# Patient Record
Sex: MALE | Race: WHITE | NOT HISPANIC OR LATINO | Employment: FULL TIME | URBAN - METROPOLITAN AREA
[De-identification: names, ages, dates, MRNs, and addresses within clinical notes are randomized per-mention and may not be internally consistent; named-entity substitution may affect disease eponyms.]

---

## 2017-04-24 RX ORDER — TADALAFIL 5 MG/1
TABLET, FILM COATED ORAL
Qty: 30 TABLET | Refills: 5 | Status: SHIPPED | OUTPATIENT
Start: 2017-04-24 | End: 2018-04-30 | Stop reason: SDUPTHER

## 2017-10-06 RX ORDER — VALACYCLOVIR HYDROCHLORIDE 500 MG/1
TABLET, FILM COATED ORAL
Qty: 30 TABLET | Refills: 1 | Status: SHIPPED | OUTPATIENT
Start: 2017-10-06 | End: 2017-11-14 | Stop reason: SDUPTHER

## 2017-11-14 RX ORDER — VALACYCLOVIR HYDROCHLORIDE 500 MG/1
TABLET, FILM COATED ORAL
Qty: 30 TABLET | Refills: 1 | Status: SHIPPED | OUTPATIENT
Start: 2017-11-14 | End: 2018-02-02 | Stop reason: SDUPTHER

## 2018-01-15 ENCOUNTER — LAB VISIT (OUTPATIENT)
Dept: LAB | Facility: HOSPITAL | Age: 68
End: 2018-01-15
Attending: FAMILY MEDICINE
Payer: COMMERCIAL

## 2018-01-15 ENCOUNTER — OFFICE VISIT (OUTPATIENT)
Dept: INTERNAL MEDICINE | Facility: CLINIC | Age: 68
End: 2018-01-15
Payer: COMMERCIAL

## 2018-01-15 VITALS
TEMPERATURE: 97 F | BODY MASS INDEX: 29.22 KG/M2 | WEIGHT: 197.31 LBS | DIASTOLIC BLOOD PRESSURE: 70 MMHG | SYSTOLIC BLOOD PRESSURE: 138 MMHG | HEIGHT: 69 IN | HEART RATE: 69 BPM | OXYGEN SATURATION: 96 %

## 2018-01-15 DIAGNOSIS — Z00.00 ROUTINE HEALTH MAINTENANCE: Primary | ICD-10-CM

## 2018-01-15 DIAGNOSIS — K21.9 GASTROESOPHAGEAL REFLUX DISEASE, ESOPHAGITIS PRESENCE NOT SPECIFIED: ICD-10-CM

## 2018-01-15 DIAGNOSIS — R91.1 PULMONARY NODULE: ICD-10-CM

## 2018-01-15 DIAGNOSIS — K76.0 FATTY LIVER: ICD-10-CM

## 2018-01-15 DIAGNOSIS — Z00.00 ROUTINE HEALTH MAINTENANCE: ICD-10-CM

## 2018-01-15 DIAGNOSIS — Z12.11 SCREEN FOR COLON CANCER: ICD-10-CM

## 2018-01-15 DIAGNOSIS — D22.9 NUMEROUS MOLES: ICD-10-CM

## 2018-01-15 LAB
ALBUMIN SERPL BCP-MCNC: 3.7 G/DL
ALP SERPL-CCNC: 123 U/L
ALT SERPL W/O P-5'-P-CCNC: 39 U/L
ANION GAP SERPL CALC-SCNC: 6 MMOL/L
AST SERPL-CCNC: 28 U/L
BASOPHILS # BLD AUTO: 0.02 K/UL
BASOPHILS NFR BLD: 0.3 %
BILIRUB SERPL-MCNC: 0.4 MG/DL
BUN SERPL-MCNC: 15 MG/DL
CALCIUM SERPL-MCNC: 9.6 MG/DL
CHLORIDE SERPL-SCNC: 104 MMOL/L
CHOLEST SERPL-MCNC: 123 MG/DL
CHOLEST/HDLC SERPL: 2.9 {RATIO}
CO2 SERPL-SCNC: 29 MMOL/L
COMPLEXED PSA SERPL-MCNC: 2.4 NG/ML
CREAT SERPL-MCNC: 0.8 MG/DL
DIFFERENTIAL METHOD: NORMAL
EOSINOPHIL # BLD AUTO: 0.2 K/UL
EOSINOPHIL NFR BLD: 2.5 %
ERYTHROCYTE [DISTWIDTH] IN BLOOD BY AUTOMATED COUNT: 12.3 %
EST. GFR  (AFRICAN AMERICAN): >60 ML/MIN/1.73 M^2
EST. GFR  (NON AFRICAN AMERICAN): >60 ML/MIN/1.73 M^2
GLUCOSE SERPL-MCNC: 88 MG/DL
HCT VFR BLD AUTO: 46 %
HDLC SERPL-MCNC: 42 MG/DL
HDLC SERPL: 34.1 %
HGB BLD-MCNC: 15.3 G/DL
IMM GRANULOCYTES # BLD AUTO: 0.01 K/UL
IMM GRANULOCYTES NFR BLD AUTO: 0.2 %
LDLC SERPL CALC-MCNC: 53.2 MG/DL
LYMPHOCYTES # BLD AUTO: 1.4 K/UL
LYMPHOCYTES NFR BLD: 22.5 %
MCH RBC QN AUTO: 30.1 PG
MCHC RBC AUTO-ENTMCNC: 33.3 G/DL
MCV RBC AUTO: 90 FL
MONOCYTES # BLD AUTO: 0.6 K/UL
MONOCYTES NFR BLD: 9.7 %
NEUTROPHILS # BLD AUTO: 4 K/UL
NEUTROPHILS NFR BLD: 64.8 %
NONHDLC SERPL-MCNC: 81 MG/DL
NRBC BLD-RTO: 0 /100 WBC
PLATELET # BLD AUTO: 205 K/UL
PMV BLD AUTO: 11.3 FL
POTASSIUM SERPL-SCNC: 4.9 MMOL/L
PROT SERPL-MCNC: 7.2 G/DL
RBC # BLD AUTO: 5.09 M/UL
SODIUM SERPL-SCNC: 139 MMOL/L
TRIGL SERPL-MCNC: 139 MG/DL
WBC # BLD AUTO: 6.09 K/UL

## 2018-01-15 PROCEDURE — 90471 IMMUNIZATION ADMIN: CPT | Mod: S$GLB,,, | Performed by: FAMILY MEDICINE

## 2018-01-15 PROCEDURE — 99397 PER PM REEVAL EST PAT 65+ YR: CPT | Mod: 25,S$GLB,, | Performed by: FAMILY MEDICINE

## 2018-01-15 PROCEDURE — 36415 COLL VENOUS BLD VENIPUNCTURE: CPT | Mod: PO

## 2018-01-15 PROCEDURE — 84153 ASSAY OF PSA TOTAL: CPT

## 2018-01-15 PROCEDURE — 85025 COMPLETE CBC W/AUTO DIFF WBC: CPT

## 2018-01-15 PROCEDURE — 99999 PR PBB SHADOW E&M-EST. PATIENT-LVL III: CPT | Mod: PBBFAC,,, | Performed by: FAMILY MEDICINE

## 2018-01-15 PROCEDURE — 80061 LIPID PANEL: CPT

## 2018-01-15 PROCEDURE — 80053 COMPREHEN METABOLIC PANEL: CPT

## 2018-01-15 PROCEDURE — 90670 PCV13 VACCINE IM: CPT | Mod: S$GLB,,, | Performed by: FAMILY MEDICINE

## 2018-01-15 NOTE — PROGRESS NOTES
Subjective:       Patient ID: Olivier Cuenca is a 67 y.o. male.    Chief Complaint: here for physical examination and issues  below    HPIgerd /hoarsenss resolved on ppi. He stopped sec cri concerns.  bph cialis helps  Fatty liver due flaquita  pulm nod due flaquita  Past Medical History:   Diagnosis Date    BPH (benign prostatic hyperplasia)     Erectile dysfunction     Ex-smoker     quit 1980s    Fatty liver     Herpes simplex     History of poliomyelitis     Migraine     Thalassemia minor      Past Surgical History:   Procedure Laterality Date    APPENDECTOMY      CHOLECYSTECTOMY      HERNIA REPAIR       Family History   Problem Relation Age of Onset    Dementia Mother     Hyperlipidemia Father     Melanoma Neg Hx      Social History     Social History    Marital status:      Spouse name: N/A    Number of children: 2    Years of education: N/A     Occupational History     [employer]     [employer]     Social History Main Topics    Smoking status: Former Smoker     Start date: 3/13/1964     Quit date: 3/13/1985    Smokeless tobacco: Never Used    Alcohol use No    Drug use: No    Sexual activity: Not Asked     Other Topics Concern    None     Social History Narrative    None        Review of Systems  Cardiovascular: no chest pain  Chest: no shortness of breath  Abd: no abd pain  Remainder review of systems negative    Objective:      Physical Exam   Constitutional: He is oriented to person, place, and time. He appears well-developed and well-nourished.   HENT:   Head: Normocephalic and atraumatic.   Right Ear: External ear normal.   Left Ear: External ear normal.   Nose: Nose normal.   Mouth/Throat: Oropharynx is clear and moist.   Nl tms   Eyes: Conjunctivae and EOM are normal. Pupils are equal, round, and reactive to light. No scleral icterus.   Neck: Normal range of motion. Neck supple. Carotid bruit is not present.   Cardiovascular: Normal  rate, regular rhythm and normal heart sounds.  Exam reveals no gallop and no friction rub.    No murmur heard.  Pulmonary/Chest: Effort normal and breath sounds normal. He has no wheezes.   Abdominal: Soft. Bowel sounds are normal. He exhibits no distension and no mass. There is no hepatosplenomegaly. There is no tenderness. There is no rebound and no guarding.   Musculoskeletal: Normal range of motion. He exhibits no tenderness.   Lymphadenopathy:     He has no cervical adenopathy.   Neurological: He is alert and oriented to person, place, and time. No cranial nerve deficit. Coordination normal.   Skin: Skin is warm and dry. No rash noted. No erythema.   Psychiatric: He has a normal mood and affect. His behavior is normal. Judgment and thought content normal.   Nursing note and vitals reviewed.    skin numerous moles on back.   Assessment:       1. Routine health maintenance    2. Fatty liver    3. Pulmonary nodule    4. Gastroesophageal reflux disease, esophagitis presence not specified    5. Numerous moles    6. Screen for colon cancer        Plan:       *notify if zntc doesn't resolve**    Lab 12 months and follow up after  Lab today  Routine health maintenance  -     PSA, Screening; Future; Expected date: 01/15/2018  -     Lipid panel; Future; Expected date: 01/15/2018  -     Comprehensive metabolic panel; Future; Expected date: 01/15/2018  -     Comprehensive metabolic panel; Future; Expected date: 01/15/2019  -     Lipid panel; Future; Expected date: 01/15/2019  -     PSA, Screening; Future; Expected date: 01/15/2019    Fatty liver  -     CBC auto differential; Future; Expected date: 01/15/2019  -     CBC auto differential; Future; Expected date: 01/15/2018    Pulmonary nodule  -     CT Chest Without Contrast; Future; Expected date: 01/15/2018    Gastroesophageal reflux disease, esophagitis presence not specified    Numerous moles  -     Ambulatory Referral to Dermatology    Screen for colon cancer  -      Case request GI: COLONOSCOPY    Other orders  -     Cancel: Basic metabolic panel; Future; Expected date: 01/15/2018  -     (In Office Administered) Pneumococcal Conjugate Vaccine (13 Valent) (IM)  -     ranitidine (ZANTAC) 300 MG capsule; Take 1 capsule (300 mg total) by mouth every evening.  Dispense: 30 capsule; Refill: 11

## 2018-01-22 ENCOUNTER — PATIENT MESSAGE (OUTPATIENT)
Dept: INTERNAL MEDICINE | Facility: CLINIC | Age: 68
End: 2018-01-22

## 2018-01-22 ENCOUNTER — HOSPITAL ENCOUNTER (OUTPATIENT)
Dept: RADIOLOGY | Facility: HOSPITAL | Age: 68
Discharge: HOME | End: 2018-01-22
Attending: FAMILY MEDICINE
Payer: COMMERCIAL

## 2018-01-22 DIAGNOSIS — R91.1 PULMONARY NODULE: ICD-10-CM

## 2018-01-22 DIAGNOSIS — R91.1 LUNG NODULE: Primary | ICD-10-CM

## 2018-01-22 PROCEDURE — 71250 CT THORAX DX C-: CPT | Mod: 26,,, | Performed by: RADIOLOGY

## 2018-01-22 PROCEDURE — 71250 CT THORAX DX C-: CPT | Mod: TC,PO

## 2018-01-22 NOTE — TELEPHONE ENCOUNTER
Please let him know there is a new tiny nodule that should be rechecked in a year  Ct chest one yr

## 2018-01-26 ENCOUNTER — DOCUMENTATION ONLY (OUTPATIENT)
Dept: ENDOSCOPY | Facility: HOSPITAL | Age: 68
End: 2018-01-26

## 2018-02-02 RX ORDER — SODIUM, POTASSIUM,MAG SULFATES 17.5-3.13G
254 SOLUTION, RECONSTITUTED, ORAL ORAL ONCE
Qty: 354 ML | Refills: 0 | Status: SHIPPED | OUTPATIENT
Start: 2018-02-02 | End: 2018-02-02

## 2018-02-02 RX ORDER — VALACYCLOVIR HYDROCHLORIDE 500 MG/1
TABLET, FILM COATED ORAL
Qty: 30 TABLET | Refills: 11 | Status: SHIPPED | OUTPATIENT
Start: 2018-02-02 | End: 2019-11-18

## 2018-02-26 ENCOUNTER — TELEPHONE (OUTPATIENT)
Dept: DERMATOLOGY | Facility: CLINIC | Age: 68
End: 2018-02-26

## 2018-02-26 NOTE — TELEPHONE ENCOUNTER
----- Message from Fern Cummins sent at 2/26/2018  7:51 AM CST -----  Contact: [phone]   Pt would like to be worked in for a sooner appt than may, pt will be out of town on schedule appt.  Please call back at [phone]. Thx-AH

## 2018-02-28 ENCOUNTER — HOSPITAL ENCOUNTER (OUTPATIENT)
Facility: HOSPITAL | Age: 68
Discharge: HOME | End: 2018-02-28
Attending: INTERNAL MEDICINE | Admitting: INTERNAL MEDICINE
Payer: COMMERCIAL

## 2018-02-28 ENCOUNTER — ANESTHESIA EVENT (OUTPATIENT)
Dept: ENDOSCOPY | Facility: HOSPITAL | Age: 68
End: 2018-02-28

## 2018-02-28 ENCOUNTER — ANESTHESIA (OUTPATIENT)
Dept: ENDOSCOPY | Facility: HOSPITAL | Age: 68
End: 2018-02-28

## 2018-02-28 ENCOUNTER — SURGERY (OUTPATIENT)
Age: 68
End: 2018-02-28

## 2018-02-28 DIAGNOSIS — Z12.11 SCREEN FOR COLON CANCER: ICD-10-CM

## 2018-02-28 PROCEDURE — 27201012 HC FORCEPS, HOT/COLD, DISP: Performed by: INTERNAL MEDICINE

## 2018-02-28 PROCEDURE — 45380 COLONOSCOPY AND BIOPSY: CPT | Performed by: INTERNAL MEDICINE

## 2018-02-28 PROCEDURE — 88305 TISSUE EXAM BY PATHOLOGIST: CPT | Mod: 26,,, | Performed by: PATHOLOGY

## 2018-02-28 PROCEDURE — 88305 TISSUE EXAM BY PATHOLOGIST: CPT | Performed by: PATHOLOGY

## 2018-02-28 PROCEDURE — 45380 COLONOSCOPY AND BIOPSY: CPT | Mod: 33,,, | Performed by: INTERNAL MEDICINE

## 2018-02-28 PROCEDURE — 25000003 PHARM REV CODE 250: Performed by: INTERNAL MEDICINE

## 2018-02-28 RX ORDER — SODIUM CHLORIDE, SODIUM LACTATE, POTASSIUM CHLORIDE, CALCIUM CHLORIDE 600; 310; 30; 20 MG/100ML; MG/100ML; MG/100ML; MG/100ML
INJECTION, SOLUTION INTRAVENOUS CONTINUOUS
Status: DISCONTINUED | OUTPATIENT
Start: 2018-02-28 | End: 2018-02-28 | Stop reason: HOSPADM

## 2018-02-28 RX ADMIN — SODIUM CHLORIDE, SODIUM LACTATE, POTASSIUM CHLORIDE, AND CALCIUM CHLORIDE: .6; .31; .03; .02 INJECTION, SOLUTION INTRAVENOUS at 01:02

## 2018-02-28 NOTE — H&P
Short Stay Endoscopy History and Physical    PCP - Gucci Giang MD    Procedure - Colonoscopy  ASA - II  Mallampati - does not want anesthesia  History of Anesthesia problems - no  Family history Anesthesia problems -  no     HPI:  This is a 67 y.o. male here for evaluation of :  Screening for colon cancer    Average Risk Screening:no  Family history of colon cancer: No  History of polyps: yes  Anemia: No  Blood in stools: No  Diarrhea: No  Abdominal Pain: No    Review of Systems:  CONSTITUTIONAL: Denies weight change,  fatigue, fevers, chills, night sweats.  CARDIOVASCULAR: Denies chest pain, shortness of breath, orthopnea and edema.  RESPIRATORY: Denies cough, hemoptysis, dyspnea, and wheezing.  GI: See HPI.    Medical History:  Past Medical History:   Diagnosis Date    BPH (benign prostatic hyperplasia)     Erectile dysfunction     Ex-smoker     quit 1980s    Fatty liver     Herpes simplex     History of poliomyelitis     Migraine     Thalassemia minor        Surgical History:   Past Surgical History:   Procedure Laterality Date    APPENDECTOMY      CHOLECYSTECTOMY      HERNIA REPAIR         Family History:   Family History   Problem Relation Age of Onset    Dementia Mother     Hyperlipidemia Father     Melanoma Neg Hx        Social History:   Social History   Substance Use Topics    Smoking status: Former Smoker     Start date: 3/13/1964     Quit date: 3/13/1985    Smokeless tobacco: Never Used    Alcohol use No       Allergies: Reviewed.    Medications:  No current facility-administered medications on file prior to encounter.      Current Outpatient Prescriptions on File Prior to Encounter   Medication Sig Dispense Refill    CIALIS 5 mg tablet Take 1 tablet (5 mg total) by mouth once daily. **May Cause Dizziness** *DO NOT TAKE WITH NITRATE * 30 tablet 5    ranitidine (ZANTAC) 300 MG capsule Take 1 capsule (300 mg total) by mouth every evening. 30 capsule 11       Physical  Exam:  Vital Signs:   Vitals:    02/28/18 1246   BP: (!) 143/94   Pulse: 84   Resp: 18   Temp: 98 °F (36.7 °C)     General Appearance: Well appearing in no acute distress  ENT: OP clear  Chest: CTA B  CV: RRR, no m/r/g  Abd: s/nt/nd/nabs  Ext: no edema    Labs:  Lab Results   Component Value Date    WBC 6.09 01/15/2018    HGB 15.3 01/15/2018    HCT 46.0 01/15/2018    MCV 90 01/15/2018     01/15/2018     No results found for: INR, PROTIME  Lab Results   Component Value Date    FERRITIN 280 02/02/2010         IMPRESSION:  Patient Active Problem List   Diagnosis    Fatty liver    Erectile dysfunction    BPH (benign prostatic hyperplasia)    Personal history of colonic polyps    Poliomyelitis osteopathy of left lower leg    Pulmonary nodule    Neck pain    DDD (degenerative disc disease), cervical    GERD (gastroesophageal reflux disease)    Screen for colon cancer       Colon polyps    Plan:  I have explained the risks and benefits of colonoscopy to the patient including but not limited to bleeding, perforation, infection, and death. The patient wishes to proceed with colonoscopy.

## 2018-02-28 NOTE — DISCHARGE INSTRUCTIONS
Diverticulosis    Diverticulosis means that small pouches have formed in the wall of your large intestine (colon). Most often, this problem causes no symptoms and is common as people age. But the pouches in the colon are at risk of becoming infected. When this happens, the condition is called diverticulitis. Although most people with diverticulosis never develop diverticulitis, it is still not uncommon. Rectal bleeding can also occur and in less common situations, a type of colon inflammation called colitis.  While most people do not have symptoms, some people with diverticulosis may have:  · Abdominal cramps and pain  · Bloating  · Constipation  · Change in bowel habits  Causes  The exact cause of diverticulosis (and diverticulitis) has not been proved, but a few things are associated with the condition:  · Low-fiber diet  · Constipation  · Lack of exercise  Your healthcare provider will talk with you about how to manage your condition. Diet changes may be all that are needed to help control diverticulosis and prevent progression to diverticulitis. If you develop diverticulitis, you will likely need other treatments.  Home care  You may be told to take fiber supplements daily. Fiber adds bulk to the stool so that it passes through the colon more easily. Stool softeners may be recommended. You may also be given medications for pain relief. Be sure to take all medications as directed.  In the past, people were told to avoid corn, nuts, and seeds. This is no longer necessary.  Follow these guidelines when caring for yourself at home:  · Eat unprocessed foods that are high in fiber. Whole grains, fruits, and vegetables are good choices.  · Drink 6 to 8 glasses of water every day unless your healthcare provider has you limit how much fluid you should have.  · Watch for changes in your bowel movements. Tell your provider if you notice any changes.  · Begin an exercise program. Ask your provider how to get started.  Generally, walking is the best.  · Get plenty of rest and sleep.  Follow-up care  Follow up with your healthcare provider, or as advised. Regular visits may be needed to check on your health. Sometimes special procedures such as colonoscopy, are needed after an episode of diverticulitis or blooding. Be sure to keep all your appointments.  If a stool sample was taken, or cultures were done, you should be told if they are positive, or if your treatment needs to be changed. You can call as directed for the results.  If X-rays were done, a radiologist will look at them. You will be told if there is a change in your treatment.  If antibiotics were prescribed, be sure to finish them all.  When to seek medical advice  Call your healthcare provider right away if any of these occur:  · Fever of 100.4°F (38°C) or higher, or as directed by your healthcare provider  · Severe cramps in the lower left side of the abdomen or pain that is getting worse  · Tenderness in the lower left side of the abdomen or worsening pain throughout the abdomen  · Diarrhea or constipation that doesn't get better within 24 hours  · Nausea and vomiting  · Bleeding from the rectum  Call 911  Call emergency services if any of the following occur:  · Trouble breathing  · Confusion  · Very drowsy or trouble awakening  · Fainting or loss of consciousness  · Rapid heart rate  · Chest pain  Date Last Reviewed: 12/30/2015 © 2000-2017 [employer]. [address] [place] [address]. All rights reserved. This information is not intended as a substitute for professional medical care. Always follow your healthcare professional's instructions.        Understanding Colon and Rectal Polyps    The colon (also called the large intestine) is a muscular tube that forms the last part of the digestive tract. It absorbs water and stores food waste. The colon is about 4 to 6 feet long. The rectum is the last 6 inches of the colon. The colon and rectum  have a smooth lining composed of millions of cells. Changes in these cells can lead to growths in the colon that can become cancerous and should be removed. Multiple tests are available to screen for colon cancer, but the colonoscopy is the most recommended test. During colonoscopy, these polyps can be removed. How often you need this test depends on many things including your condition, your family history, symptoms, and what the findings were at the previous colonoscopy.   When the colon lining changes  Changes that happen in the cells that line the colon or rectum can lead to growths called polyps. Over a period of years, polyps can turn cancerous. Removing polyps early may prevent cancer from ever forming.  Polyps  Polyps are fleshy clumps of tissue that form on the lining of the colon or rectum. Small polyps are usually benign (not cancerous). However, over time, cells in a polyp can change and become cancerous. Certain types of polyps known as adenomatous polyps are premalignant. The risk for invasive cancer increases with the size of the polyp and certain cell and gene features. This means that they can become cancerous if they're not removed. Hyperplastic polyps are benign. They can grow quite large and not turn cancerous.   Cancer  Almost all colorectal cancers start when polyp cells begin growing abnormally. As a cancerous tumor grows, it may involve more and more of the colon or rectum. In time, cancer can also grow beyond the colon or rectum and spread to nearby organs or to glands called lymph nodes. The cells can also travel to other parts of the body. This is known as metastasis. The earlier a cancerous tumor is removed, the better the chance of preventing its spread.    Date Last Reviewed: 8/1/2016  © 8380-6817 [employer]. [address] [place] [address], [place] [address]. All rights reserved. This information is not intended as a substitute for professional medical care. Always follow your  healthcare professional's instructions.

## 2018-03-01 ENCOUNTER — TELEPHONE (OUTPATIENT)
Dept: DERMATOLOGY | Facility: CLINIC | Age: 68
End: 2018-03-01

## 2018-03-01 VITALS
DIASTOLIC BLOOD PRESSURE: 75 MMHG | RESPIRATION RATE: 18 BRPM | WEIGHT: 191 LBS | BODY MASS INDEX: 28.29 KG/M2 | OXYGEN SATURATION: 96 % | SYSTOLIC BLOOD PRESSURE: 147 MMHG | TEMPERATURE: 98 F | HEART RATE: 66 BPM | HEIGHT: 69 IN

## 2018-03-01 NOTE — TELEPHONE ENCOUNTER
----- Message from Viji Young sent at 3/1/2018 12:41 PM CST -----  Contact: Pt   Pt request call back about his appointment. .[phone] (znaj)

## 2018-03-07 ENCOUNTER — HOSPITAL ENCOUNTER (OUTPATIENT)
Dept: RADIOLOGY | Facility: HOSPITAL | Age: 68
Discharge: HOME | End: 2018-03-07
Attending: FAMILY MEDICINE
Payer: COMMERCIAL

## 2018-03-07 ENCOUNTER — PATIENT MESSAGE (OUTPATIENT)
Dept: INTERNAL MEDICINE | Facility: CLINIC | Age: 68
End: 2018-03-07

## 2018-03-07 ENCOUNTER — OFFICE VISIT (OUTPATIENT)
Dept: INTERNAL MEDICINE | Facility: CLINIC | Age: 68
End: 2018-03-07
Payer: COMMERCIAL

## 2018-03-07 VITALS
OXYGEN SATURATION: 98 % | BODY MASS INDEX: 29.22 KG/M2 | SYSTOLIC BLOOD PRESSURE: 126 MMHG | HEIGHT: 69 IN | DIASTOLIC BLOOD PRESSURE: 64 MMHG | HEART RATE: 79 BPM | WEIGHT: 197.31 LBS | TEMPERATURE: 97 F

## 2018-03-07 DIAGNOSIS — N28.1 RENAL CYST: Primary | ICD-10-CM

## 2018-03-07 DIAGNOSIS — R10.11 RUQ PAIN: ICD-10-CM

## 2018-03-07 PROCEDURE — 74177 CT ABD & PELVIS W/CONTRAST: CPT | Mod: 26,,, | Performed by: RADIOLOGY

## 2018-03-07 PROCEDURE — 74177 CT ABD & PELVIS W/CONTRAST: CPT | Mod: TC,PO

## 2018-03-07 PROCEDURE — 25500020 PHARM REV CODE 255: Mod: PO | Performed by: FAMILY MEDICINE

## 2018-03-07 PROCEDURE — 99214 OFFICE O/P EST MOD 30 MIN: CPT | Mod: S$GLB,,, | Performed by: FAMILY MEDICINE

## 2018-03-07 PROCEDURE — 99999 PR PBB SHADOW E&M-EST. PATIENT-LVL III: CPT | Mod: PBBFAC,,, | Performed by: FAMILY MEDICINE

## 2018-03-07 RX ADMIN — IOHEXOL 100 ML: 350 INJECTION, SOLUTION INTRAVENOUS at 02:03

## 2018-03-07 RX ADMIN — IOHEXOL 30 ML: 350 INJECTION, SOLUTION INTRAVENOUS at 01:03

## 2018-03-07 NOTE — TELEPHONE ENCOUNTER
Please let him know Ct did not show anything concerning.there are some benign appearing cysts in kidney. I doubt this is causing his symptoms but worth getting an ultrsound to better visualize and then a urologist after

## 2018-03-07 NOTE — PROGRESS NOTES
Subjective:       Patient ID: Olivier Cuenca is a 67 y.o. male.    Chief Complaint: flank pain  HPImany many years r lower lateral  Back pain, r lateral area lower chest and occas ruq pain. At times separate or  together. No change w eating. Does change w rotation or sneezing. No cp sob. No c/o cough fever simlar c/o in past led to liver u/s showed fatty liver. egd gastritis . Recent chest ct.   Past Medical History:   Diagnosis Date    BPH (benign prostatic hyperplasia)     Erectile dysfunction     Ex-smoker     quit 1980s    Fatty liver     Herpes simplex     History of poliomyelitis     Migraine     Thalassemia minor      Past Surgical History:   Procedure Laterality Date    APPENDECTOMY      CHOLECYSTECTOMY      COLONOSCOPY N/A 2/28/2018    Procedure: COLONOSCOPY;  Surgeon: Bo Moreno MD;  Location: [facility];  Service: Endoscopy;  Laterality: N/A;  Pt requesting no anesthesia - no sedation.  Will not have .    HERNIA REPAIR       Family History   Problem Relation Age of Onset    Dementia Mother     Hyperlipidemia Father     Melanoma Neg Hx      Social History     Social History    Marital status: Single     Spouse name: N/A    Number of children: 2    Years of education: N/A     Occupational History     [employer]     [employer]     Social History Main Topics    Smoking status: Former Smoker     Start date: 3/13/1964     Quit date: 3/13/1985    Smokeless tobacco: Never Used    Alcohol use No    Drug use: No    Sexual activity: Not Asked     Other Topics Concern    None     Social History Narrative    None       Review of Systems    Objective:      Physical Exam   Constitutional: He is oriented to person, place, and time. He appears well-developed and well-nourished.   HENT:   Head: Normocephalic and atraumatic.   Neck: Normal range of motion. Neck supple. Carotid bruit is not present.   Cardiovascular: Normal rate and  regular rhythm.    Pulmonary/Chest: Effort normal and breath sounds normal.   Abdominal: Soft. Bowel sounds are normal. He exhibits no distension and no mass. There is no tenderness. There is no rebound and no guarding.   Musculoskeletal:   He points to  r lower lateral  Back pain, r lateral area lower chest and ruq   No ttp. No rash skin  Pain reprod w sitting up and rotating torso   Lymphadenopathy:     He has no cervical adenopathy.   Neurological: He is alert and oriented to person, place, and time.   Skin: Skin is warm and dry.   Psychiatric: He has a normal mood and affect. His behavior is normal. Judgment normal.   Nursing note and vitals reviewed.      Assessment:       1. RUQ pain        Plan:       *his main concern is r/o pathology and less w getting rid discomfort. D/wd prob m/s but he would lke to r/u rnal or intraabd cause  RUQ pain  -     Urinalysis; Future; Expected date: 03/07/2018  -     Cancel: CT Abdomen With Contrast; Future; Expected date: 03/07/2018  -     Creatinine, serum; Future; Expected date: 03/07/2018    **

## 2018-03-08 ENCOUNTER — OFFICE VISIT (OUTPATIENT)
Dept: DERMATOLOGY | Facility: CLINIC | Age: 68
End: 2018-03-08
Payer: COMMERCIAL

## 2018-03-08 DIAGNOSIS — D22.9 MULTIPLE NEVI: ICD-10-CM

## 2018-03-08 DIAGNOSIS — L82.1 SEBORRHEIC KERATOSIS: Primary | ICD-10-CM

## 2018-03-08 DIAGNOSIS — D48.5 NEOPLASM OF UNCERTAIN BEHAVIOR OF SKIN: ICD-10-CM

## 2018-03-08 DIAGNOSIS — L73.9 FOLLICULITIS: ICD-10-CM

## 2018-03-08 PROCEDURE — 88305 TISSUE EXAM BY PATHOLOGIST: CPT | Mod: 26,,, | Performed by: PATHOLOGY

## 2018-03-08 PROCEDURE — 88342 IMHCHEM/IMCYTCHM 1ST ANTB: CPT | Mod: 26,,, | Performed by: PATHOLOGY

## 2018-03-08 PROCEDURE — 99202 OFFICE O/P NEW SF 15 MIN: CPT | Mod: 25,S$GLB,, | Performed by: DERMATOLOGY

## 2018-03-08 PROCEDURE — 99999 PR PBB SHADOW E&M-EST. PATIENT-LVL II: CPT | Mod: PBBFAC,,, | Performed by: DERMATOLOGY

## 2018-03-08 PROCEDURE — 88305 TISSUE EXAM BY PATHOLOGIST: CPT | Performed by: PATHOLOGY

## 2018-03-08 PROCEDURE — 11101 PR BIOPSY, EACH ADDED LESION: CPT | Mod: S$GLB,,, | Performed by: DERMATOLOGY

## 2018-03-08 PROCEDURE — 88342 IMHCHEM/IMCYTCHM 1ST ANTB: CPT | Performed by: PATHOLOGY

## 2018-03-08 PROCEDURE — 11100 PR BIOPSY OF SKIN LESION: CPT | Mod: S$GLB,,, | Performed by: DERMATOLOGY

## 2018-03-08 RX ORDER — CLINDAMYCIN PHOSPHATE 10 UG/ML
LOTION TOPICAL 2 TIMES DAILY PRN
Qty: 60 ML | Refills: 3 | Status: SHIPPED | OUTPATIENT
Start: 2018-03-08 | End: 2019-11-18

## 2018-03-08 NOTE — PROGRESS NOTES
Subjective:       Patient ID:  Olivier Cuenca is a 67 y.o. male who presents for   Chief Complaint   Patient presents with    Mole     upper body mole check      Hx of multiple nevi, last seen on 10/1/14.  He c/o several moles of the back with concern, + changing.  No tx tried.     Denies bleeding, tender, growing, or concerning lesions. The patient denies personal or family history of skin cancer.            Review of Systems   Constitutional: Negative for fever and chills.   Gastrointestinal: Negative for nausea and vomiting.   Skin: Negative for daily sunscreen use, activity-related sunscreen use and recent sunburn.   Hematologic/Lymphatic: Does not bruise/bleed easily.        Objective:    Physical Exam   Constitutional: He appears well-developed and well-nourished. No distress.   Neurological: He is alert and oriented to person, place, and time. He is not disoriented.   Psychiatric: He has a normal mood and affect.   Skin:   Areas Examined (abnormalities noted in diagram):   Scalp / Hair Palpated and Inspected  Head / Face Inspection Performed  Neck Inspection Performed  Chest / Axilla Inspection Performed  Abdomen Inspection Performed  Back Inspection Performed  RUE Inspected  LUE Inspection Performed  Nails and Digits Inspection Performed                   Diagram Legend     Erythematous scaling macule/papule c/w actinic keratosis       Vascular papule c/w angioma      Pigmented verrucoid papule/plaque c/w seborrheic keratosis      Yellow umbilicated papule c/w sebaceous hyperplasia      Irregularly shaped tan macule c/w lentigo     1-2 mm smooth white papules consistent with Milia      Movable subcutaneous cyst with punctum c/w epidermal inclusion cyst      Subcutaneous movable cyst c/w pilar cyst      Firm pink to brown papule c/w dermatofibroma      Pedunculated fleshy papule(s) c/w skin tag(s)      Evenly pigmented macule c/w junctional nevus     Mildly variegated pigmented, slightly  irregular-bordered macule c/w mildly atypical nevus      Flesh colored to evenly pigmented papule c/w intradermal nevus       Pink pearly papule/plaque c/w basal cell carcinoma      Erythematous hyperkeratotic cursted plaque c/w SCC      Surgical scar with no sign of skin cancer recurrence      Open and closed comedones      Inflammatory papules and pustules      Verrucoid papule consistent consistent with wart     Erythematous eczematous patches and plaques     Dystrophic onycholytic nail with subungual debris c/w onychomycosis     Umbilicated papule    Erythematous-base heme-crusted tan verrucoid plaque consistent with inflamed seborrheic keratosis     Erythematous Silvery Scaling Plaque c/w Psoriasis     See annotation            Assessment / Plan:      Pathology Orders:     Normal Orders This Visit    Tissue Specimen To Pathology, Dermatology     Questions:    Directional Terms:  Other(comment)    Clinical information:  nevus r/o atypia    Specific Site:  right upper back    Tissue Specimen To Pathology, Dermatology     Questions:    Directional Terms:  Other(comment)    Clinical information:  nevus r/o atypia    Specific Site:  midline upper back        Seborrheic keratosis  Reassurance given. Discussed diagnosis and that lesions are benign.  AAD handout given.     Multiple nevi  Reassurance given.  Discussed ABCDEF of melanoma and changes for patient to look for.  AAD Handout given. Discussed importance of daily use of sunscreen.      Folliculitis  -     clindamycin (CLEOCIN T) 1 % lotion; Apply topically 2 (two) times daily as needed. For folliculitis in scalp  Dispense: 60 mL; Refill: 3  -     Of scalp, will start above med.     Neoplasm of uncertain behavior of skin  -     Tissue Specimen To Pathology, Dermatology  -     Tissue Specimen To Pathology, Dermatology  -     Shave biopsy(-ies) done of 2 site(s).   Patient informed to call for results within 2 weeks if have not received notification via telephone  call or [facility]           Follow-up for call for results.     PROCEDURE NOTE - SHAVE BIOPSY   Location: see above    After risk, benefits, and alternatives were discussed with the patient, the patient agrees to the procedure by verbal informed consent.  The area(s) were cleansed with alcohol. 2 cc of lidocaine 1% with epinephrine was injected for local anesthesia into each lesion(s).  A sharp dermablade was used to remove part or all of the lesion(s).  The specimen(s) will be sent for tissue pathology.  Hemostasis was obtained with aluminum chloride and/or hyfrecation.  The area(s) were dressed with vaseline ointment and bandaged.  The patient tolerated the procedure well without adverse events.  Wound care instructions were given to the patient on the AVS.  The patient will be notified of pathology results once available. Results will also be available in Epic.

## 2018-03-08 NOTE — PATIENT INSTRUCTIONS
Shave Biopsy Wound Care    Your doctor has performed a shave biopsy today.  A band aid and vaseline ointment has been placed over the site.  This should remain in place for 24 hours.  It is recommended that you keep the area dry for the first 24 hours.  After 24 hours, you may remove the band aid and wash the area with warm soap and water and apply Vaseline jelly.  Many patients prefer to use Neosporin or Bacitracin ointment.  This is acceptable; however, know that you can develop an allergy to this medication even if you have used it safely for years.  It is important to keep the area moist.  Letting it dry out and get air slows healing time, and will worsen the scar.  Band aid is optional after first 24 hours.      If you notice increasing redness, tenderness, pain, or yellow drainage at the biopsy site, please notify your doctor.  These are signs of an infection.    If your biopsy site is bleeding, apply firm pressure for 15 minutes straight.  Repeat for another 15 minutes, if it is still bleeding.   If the surgical site continues to bleed, then please contact your doctor.      [facility] - [facility]   DERMATOLOGY  [address]   [place] [address]   Dept: [phone]   Dept Fax: [phone]

## 2018-03-12 ENCOUNTER — TELEPHONE (OUTPATIENT)
Dept: RADIOLOGY | Facility: HOSPITAL | Age: 68
End: 2018-03-12

## 2018-03-13 ENCOUNTER — HOSPITAL ENCOUNTER (OUTPATIENT)
Dept: RADIOLOGY | Facility: HOSPITAL | Age: 68
Discharge: HOME | End: 2018-03-13
Attending: FAMILY MEDICINE
Payer: COMMERCIAL

## 2018-03-13 DIAGNOSIS — N28.1 RENAL CYST: ICD-10-CM

## 2018-03-13 PROCEDURE — 76770 US EXAM ABDO BACK WALL COMP: CPT | Mod: TC,PO

## 2018-03-13 PROCEDURE — 76770 US EXAM ABDO BACK WALL COMP: CPT | Mod: 26,,, | Performed by: RADIOLOGY

## 2018-03-15 ENCOUNTER — PATIENT MESSAGE (OUTPATIENT)
Dept: DERMATOLOGY | Facility: CLINIC | Age: 68
End: 2018-03-15

## 2018-03-20 ENCOUNTER — PATIENT MESSAGE (OUTPATIENT)
Dept: INTERNAL MEDICINE | Facility: CLINIC | Age: 68
End: 2018-03-20

## 2018-03-20 DIAGNOSIS — N28.1 RENAL CYST: Primary | ICD-10-CM

## 2018-03-20 NOTE — TELEPHONE ENCOUNTER
Please let him know there is a not uncommon cyst on kidney on left side (different area from pain area); recommend urology further eval

## 2018-05-02 RX ORDER — TADALAFIL 5 MG/1
TABLET, FILM COATED ORAL
Qty: 30 TABLET | Refills: 11 | Status: SHIPPED | OUTPATIENT
Start: 2018-05-02 | End: 2019-05-22 | Stop reason: SDUPTHER

## 2018-06-25 ENCOUNTER — OFFICE VISIT (OUTPATIENT)
Dept: UROLOGY | Facility: CLINIC | Age: 68
End: 2018-06-25
Payer: COMMERCIAL

## 2018-06-25 VITALS
DIASTOLIC BLOOD PRESSURE: 88 MMHG | BODY MASS INDEX: 29.78 KG/M2 | HEIGHT: 69 IN | HEART RATE: 68 BPM | SYSTOLIC BLOOD PRESSURE: 152 MMHG | WEIGHT: 201.06 LBS

## 2018-06-25 DIAGNOSIS — N40.0 BENIGN PROSTATIC HYPERPLASIA, UNSPECIFIED WHETHER LOWER URINARY TRACT SYMPTOMS PRESENT: ICD-10-CM

## 2018-06-25 DIAGNOSIS — N28.1 RENAL CYST: Primary | ICD-10-CM

## 2018-06-25 LAB
BILIRUB SERPL-MCNC: NORMAL MG/DL
BLOOD URINE, POC: NORMAL
COLOR, POC UA: YELLOW
GLUCOSE UR QL STRIP: NORMAL
KETONES UR QL STRIP: NORMAL
LEUKOCYTE ESTERASE URINE, POC: NORMAL
NITRITE, POC UA: NORMAL
PH, POC UA: 5
PROTEIN, POC: NORMAL
SPECIFIC GRAVITY, POC UA: 1.02
UROBILINOGEN, POC UA: NORMAL

## 2018-06-25 PROCEDURE — 81002 URINALYSIS NONAUTO W/O SCOPE: CPT | Mod: S$GLB,,, | Performed by: UROLOGY

## 2018-06-25 PROCEDURE — 99244 OFF/OP CNSLTJ NEW/EST MOD 40: CPT | Mod: 25,S$GLB,, | Performed by: UROLOGY

## 2018-06-25 PROCEDURE — 99999 PR PBB SHADOW E&M-EST. PATIENT-LVL II: CPT | Mod: PBBFAC,,, | Performed by: UROLOGY

## 2018-06-25 NOTE — LETTER
June 25, 2018        Gucci Giang MD  [address] [facility] [address]             [facility]  [address] [place] [id number]  Phone: [phone]  Fax: [phone]   Patient: Olivier Cuenca   MR Number: [id number]   YOB: 1950   Date of Visit: 6/25/2018       Dear Dr. Giang:    Thank you for referring Olivier Cuenca to me for evaluation. Below are the relevant portions of my assessment and plan of care.            If you have questions, please do not hesitate to call me. I look forward to following Olivier along with you.    Sincerely,      Satinder Coleman IV, MD           CC  No Recipients

## 2018-06-25 NOTE — PROGRESS NOTES
Chief Complaint: Renal lesion?    HPI:   6/25/18: 68 yo man referred by Dr. Giang for findings on CT done for right flank pain. CT showed 3cm left simple renal cysts and distended bladder.  US shows cysts mildly complex.  Frequency, weak stream, long time to empty, not much nocturia. No abd/pelvic pain and no exac/rel factors.  No hematuria.  No urolithiasis.  No  history.  Occasional cialis for ED thinks it helps him void better.    Allergies:  Codeine and Codeine sulfate    Medications:  has a current medication list which includes the following prescription(s): cialis, clindamycin, ranitidine, and valacyclovir.    Review of Systems:  General: No fever, chills, fatigability, or weight loss.  Skin: No rashes, itching, or changes in color or texture of skin.  Chest: Denies KAN, cyanosis, wheezing, cough, and sputum production.  Abdomen: Appetite fine. No weight loss. Denies diarrhea, abdominal pain, hematemesis, or blood in stool.  Musculoskeletal: No joint stiffness or swelling. Denies back pain.  Wears brace uses stick infantile polio.  : As above.  All other review of systems negative.    PMH:   has a past medical history of BPH (benign prostatic hyperplasia); Erectile dysfunction; Ex-smoker; Fatty liver; Herpes simplex; History of poliomyelitis; Migraine; and Thalassemia minor.    PSH:   has a past surgical history that includes Cholecystectomy; Appendectomy; Hernia repair; and Colonoscopy (N/A, 2/28/2018).    FamHx: family history includes Dementia in his mother; Hyperlipidemia in his father.    SocHx:  reports that he quit smoking about 33 years ago. He started smoking about 54 years ago. He has never used smokeless tobacco. He reports that he does not drink alcohol or use drugs.      Physical Exam:  Vitals:    06/25/18 0715   BP: (!) 152/88   Pulse: 68     General: A&Ox3, no apparent distress, no deformities  Neck: No masses, normal thyroid  Lungs: normal inspiration, no use of accessory muscles  Heart:  normal pulse, no arrhythmias  Abdomen: Soft, NT, ND, no masses, no hernias, no hepatosplenomegaly  Lymphatic: Neck and groin nodes negative  Skin: The skin is warm and dry. No jaundice.  Ext: No c/c/e.  : Test desc vidya, no abnormalities of epididymus. Penis normal, with normal penile and scrotal skin. Meatus normal. Normal rectal tone, no hemorrhoids. Prost 40 gm no nodules or masses appreciated. SV not palpable. Perineum and anus normal.    Labs/Studies:   Urinalysis performed in clinic, summary: UA normal  PSA    1/18: 2.4  Bladder Scan performed in office: PVR 32 ml.    Impression/Plan:   1. HTN: discussed and referred to PCP for further management.  2. Declines flomax  3. US/RTC 1 year.

## 2018-06-25 NOTE — Clinical Note
June 25, 2018      No Recipients           [facility]  [address] [facility] [address]  Phone: [phone]  Fax: [phone]          Patient: Olivier Cuenca   MR Number: [id number]   YOB: 1950   Date of Visit: 6/25/2018       Dear Provider Caitiensystem:    Thank you for referring Olivier Cuenca to me for evaluation. Attached you will find relevant portions of my assessment and plan of care.    If you have questions, please do not hesitate to call me. I look forward to following Olivier Cuenca along with you.    Sincerely,    Satinder Coleman IV, MD    Enclosure  CC:  No Recipients    If you would like to receive this communication electronically, please contact [email or web address] or [phone] to request more information on [facility] access.    For providers and/or their staff who would like to refer a patient to [facility], please contact us through our one-stop-shop provider referral line, [facility], at [phone].    If you feel you have received this communication in error or would no longer like to receive these types of communications, please e-mail [email or web address]

## 2018-06-26 ENCOUNTER — TELEPHONE (OUTPATIENT)
Dept: UROLOGY | Facility: CLINIC | Age: 68
End: 2018-06-26

## 2018-11-13 ENCOUNTER — LAB VISIT (OUTPATIENT)
Dept: LAB | Facility: HOSPITAL | Age: 68
End: 2018-11-13
Attending: FAMILY MEDICINE
Payer: COMMERCIAL

## 2018-11-13 ENCOUNTER — OFFICE VISIT (OUTPATIENT)
Dept: INTERNAL MEDICINE | Facility: CLINIC | Age: 68
End: 2018-11-13
Payer: COMMERCIAL

## 2018-11-13 VITALS
SYSTOLIC BLOOD PRESSURE: 124 MMHG | HEIGHT: 69 IN | BODY MASS INDEX: 29.95 KG/M2 | TEMPERATURE: 98 F | DIASTOLIC BLOOD PRESSURE: 74 MMHG | OXYGEN SATURATION: 96 % | WEIGHT: 202.19 LBS | HEART RATE: 72 BPM

## 2018-11-13 DIAGNOSIS — K76.0 FATTY LIVER: ICD-10-CM

## 2018-11-13 DIAGNOSIS — Z00.00 ROUTINE HEALTH MAINTENANCE: Primary | ICD-10-CM

## 2018-11-13 DIAGNOSIS — K21.9 GASTROESOPHAGEAL REFLUX DISEASE, ESOPHAGITIS PRESENCE NOT SPECIFIED: ICD-10-CM

## 2018-11-13 DIAGNOSIS — Z00.00 ROUTINE HEALTH MAINTENANCE: ICD-10-CM

## 2018-11-13 DIAGNOSIS — R91.1 LUNG NODULE: ICD-10-CM

## 2018-11-13 LAB
ALBUMIN SERPL BCP-MCNC: 4 G/DL
ALP SERPL-CCNC: 85 U/L
ALT SERPL W/O P-5'-P-CCNC: 46 U/L
ANION GAP SERPL CALC-SCNC: 8 MMOL/L
AST SERPL-CCNC: 34 U/L
BASOPHILS # BLD AUTO: 0.04 K/UL
BASOPHILS NFR BLD: 0.6 %
BILIRUB SERPL-MCNC: 0.6 MG/DL
BUN SERPL-MCNC: 11 MG/DL
CALCIUM SERPL-MCNC: 9.9 MG/DL
CHLORIDE SERPL-SCNC: 103 MMOL/L
CHOLEST SERPL-MCNC: 141 MG/DL
CHOLEST/HDLC SERPL: 3.9 {RATIO}
CO2 SERPL-SCNC: 30 MMOL/L
COMPLEXED PSA SERPL-MCNC: 2.5 NG/ML
CREAT SERPL-MCNC: 0.8 MG/DL
DIFFERENTIAL METHOD: NORMAL
EOSINOPHIL # BLD AUTO: 0.1 K/UL
EOSINOPHIL NFR BLD: 2.2 %
ERYTHROCYTE [DISTWIDTH] IN BLOOD BY AUTOMATED COUNT: 12.2 %
EST. GFR  (AFRICAN AMERICAN): >60 ML/MIN/1.73 M^2
EST. GFR  (NON AFRICAN AMERICAN): >60 ML/MIN/1.73 M^2
GLUCOSE SERPL-MCNC: 85 MG/DL
HCT VFR BLD AUTO: 47.6 %
HDLC SERPL-MCNC: 36 MG/DL
HDLC SERPL: 25.5 %
HGB BLD-MCNC: 15.9 G/DL
IMM GRANULOCYTES # BLD AUTO: 0.02 K/UL
IMM GRANULOCYTES NFR BLD AUTO: 0.3 %
LDLC SERPL CALC-MCNC: 73 MG/DL
LYMPHOCYTES # BLD AUTO: 1.7 K/UL
LYMPHOCYTES NFR BLD: 27 %
MCH RBC QN AUTO: 30.5 PG
MCHC RBC AUTO-ENTMCNC: 33.4 G/DL
MCV RBC AUTO: 91 FL
MONOCYTES # BLD AUTO: 0.6 K/UL
MONOCYTES NFR BLD: 9 %
NEUTROPHILS # BLD AUTO: 3.8 K/UL
NEUTROPHILS NFR BLD: 60.9 %
NONHDLC SERPL-MCNC: 105 MG/DL
NRBC BLD-RTO: 0 /100 WBC
PLATELET # BLD AUTO: 209 K/UL
PMV BLD AUTO: 11.2 FL
POTASSIUM SERPL-SCNC: 4.2 MMOL/L
PROT SERPL-MCNC: 7.5 G/DL
RBC # BLD AUTO: 5.21 M/UL
SODIUM SERPL-SCNC: 141 MMOL/L
TRIGL SERPL-MCNC: 160 MG/DL
WBC # BLD AUTO: 6.3 K/UL

## 2018-11-13 PROCEDURE — 99397 PER PM REEVAL EST PAT 65+ YR: CPT | Mod: S$GLB,,, | Performed by: FAMILY MEDICINE

## 2018-11-13 PROCEDURE — 80061 LIPID PANEL: CPT

## 2018-11-13 PROCEDURE — 36415 COLL VENOUS BLD VENIPUNCTURE: CPT | Mod: PO

## 2018-11-13 PROCEDURE — 84153 ASSAY OF PSA TOTAL: CPT

## 2018-11-13 PROCEDURE — 85025 COMPLETE CBC W/AUTO DIFF WBC: CPT

## 2018-11-13 PROCEDURE — 80053 COMPREHEN METABOLIC PANEL: CPT

## 2018-11-13 PROCEDURE — 99999 PR PBB SHADOW E&M-EST. PATIENT-LVL III: CPT | Mod: PBBFAC,,, | Performed by: FAMILY MEDICINE

## 2018-11-13 RX ORDER — HYDROCODONE POLISTIREX AND CHLORPHENIRAMINE POLISTIREX 10; 8 MG/5ML; MG/5ML
SUSPENSION, EXTENDED RELEASE ORAL
COMMUNITY
End: 2018-11-13

## 2018-11-26 NOTE — PROGRESS NOTES
Subjective:       Patient ID: Olivier Cuenca is a. male.    Chief Complaint: here for physical examination and issues  below    HPIgerd /hoarsenss resolved on ppi. He stopped sec cri concerns.  bph cialis helps  Fatty liver due flaquita  pulm nod due flaquita soon    Past Medical History:   Diagnosis Date    BPH (benign prostatic hyperplasia)     Erectile dysfunction     Ex-smoker     quit 1980s    Fatty liver     Herpes simplex     History of poliomyelitis     Migraine     Thalassemia minor      Past Surgical History:   Procedure Laterality Date    APPENDECTOMY      CHOLECYSTECTOMY      HERNIA REPAIR       Family History   Problem Relation Age of Onset    Dementia Mother     Hyperlipidemia Father     Melanoma Neg Hx      Social History     Social History    Marital status:      Spouse name: N/A    Number of children: 2    Years of education: N/A     Occupational History     [employer]     Social History Main Topics    Smoking status: Former Smoker     Start date: 3/13/1964     Quit date: 3/13/1985    Smokeless tobacco: Never Used    Alcohol use No    Drug use: No    Sexual activity: Not Asked     Other Topics Concern    None     Social History Narrative    None        Review of Systems  Cardiovascular: no chest pain  Chest: no shortness of breath  Abd: no abd pain  Remainder review of systems negative    Objective:      Physical Exam   Constitutional: He is oriented to person, place, and time. He appears well-developed and well-nourished.   HENT:   Head: Normocephalic and atraumatic.   Right Ear: External ear normal.   Left Ear: External ear normal.   Nose: Nose normal.   Mouth/Throat: Oropharynx is clear and moist.   Nl tms   Eyes: Conjunctivae and EOM are normal. Pupils are equal, round, and reactive to light. No scleral icterus.   Neck: Normal range of motion. Neck supple. Carotid bruit is not present.   Cardiovascular: Normal  rate, regular rhythm and normal heart sounds.  Exam reveals no gallop and no friction rub.    No murmur heard.  Pulmonary/Chest: Effort normal and breath sounds normal. He has no wheezes.   Abdominal: Soft. Bowel sounds are normal. He exhibits no distension and no mass. There is no hepatosplenomegaly. There is no tenderness. There is no rebound and no guarding.   Musculoskeletal: Normal range of motion. He exhibits no tenderness.   Lymphadenopathy:     He has no cervical adenopathy.   Neurological: He is alert and oriented to person, place, and time. No cranial nerve deficit. Coordination normal.   Skin: Skin is warm and dry. No rash noted. No erythema.   Psychiatric: He has a normal mood and affect. His behavior is normal. Judgment and thought content normal.   Nursing note and vitals reviewed.    Assessment:       1. Routine health maintenance    2. Fatty liver    3. Pulmonary nodule    4. Gastroesophageal reflux disease, esophagitis presence not specified                Plan:       Lab 12 months and follow up after  Lab today  Routine health maintenance  -     Comprehensive metabolic panel; Future; Expected date: 11/13/2019  -     Lipid panel; Future; Expected date: 11/13/2019  -     PSA, Screening; Future; Expected date: 11/13/2019  -     CBC auto differential; Future; Expected date: 11/13/2019    Gastroesophageal reflux disease, esophagitis presence not specified    Fatty liver    Lung nodule  -     CT Chest Without Contrast; Future; Expected date: 12/13/2018

## 2018-12-04 ENCOUNTER — OFFICE VISIT (OUTPATIENT)
Dept: DERMATOLOGY | Facility: CLINIC | Age: 68
End: 2018-12-04
Payer: COMMERCIAL

## 2018-12-04 DIAGNOSIS — L82.1 SEBORRHEIC KERATOSES: ICD-10-CM

## 2018-12-04 DIAGNOSIS — Z12.83 SCREENING, MALIGNANT NEOPLASM, SKIN: ICD-10-CM

## 2018-12-04 DIAGNOSIS — D48.5 NEOPLASM OF UNCERTAIN BEHAVIOR OF SKIN: Primary | ICD-10-CM

## 2018-12-04 DIAGNOSIS — D22.9 MULTIPLE BENIGN NEVI: ICD-10-CM

## 2018-12-04 PROCEDURE — 11100 PR BIOPSY OF SKIN LESION: CPT | Mod: S$GLB,,, | Performed by: STUDENT IN AN ORGANIZED HEALTH CARE EDUCATION/TRAINING PROGRAM

## 2018-12-04 PROCEDURE — 1101F PT FALLS ASSESS-DOCD LE1/YR: CPT | Mod: CPTII,S$GLB,, | Performed by: STUDENT IN AN ORGANIZED HEALTH CARE EDUCATION/TRAINING PROGRAM

## 2018-12-04 PROCEDURE — 88305 TISSUE EXAM BY PATHOLOGIST: CPT | Performed by: PATHOLOGY

## 2018-12-04 PROCEDURE — 99212 OFFICE O/P EST SF 10 MIN: CPT | Mod: 25,S$GLB,, | Performed by: STUDENT IN AN ORGANIZED HEALTH CARE EDUCATION/TRAINING PROGRAM

## 2018-12-04 PROCEDURE — 99999 PR PBB SHADOW E&M-EST. PATIENT-LVL III: CPT | Mod: PBBFAC,,, | Performed by: STUDENT IN AN ORGANIZED HEALTH CARE EDUCATION/TRAINING PROGRAM

## 2018-12-04 PROCEDURE — 88305 TISSUE EXAM BY PATHOLOGIST: CPT | Mod: 26,,, | Performed by: PATHOLOGY

## 2018-12-04 NOTE — PATIENT INSTRUCTIONS

## 2018-12-04 NOTE — PROGRESS NOTES
Subjective:       Patient ID:  Olivier Cuenca is a 68 y.o. male who presents for   Chief Complaint   Patient presents with    Skin Check     tbse      History of Present Illness: The patient presents with chief complaint of skin check. He has a history of dysplastic nevi on the back, last seen on 3/8/18. He recently went to a skin cancer screening and it was noted that he had a suspicious appearing mole on the back.   Location: back  Duration: months  Signs/Symptoms: none   Prior treatments: none     Pt denies any history of skin cancer         Review of Systems   Skin: Negative for itching, rash and dry skin.        Objective:    Physical Exam   Constitutional: He appears well-developed and well-nourished. No distress.   Neurological: He is alert and oriented to person, place, and time. He is not disoriented.   Psychiatric: He has a normal mood and affect.   Skin:   Areas Examined (abnormalities noted in diagram):   Head / Face Inspection Performed  Neck Inspection Performed  Chest / Axilla Inspection Performed  Abdomen Inspection Performed  Back Inspection Performed  RUE Inspected  LUE Inspection Performed              Diagram Legend     Erythematous scaling macule/papule c/w actinic keratosis       Vascular papule c/w angioma      Pigmented verrucoid papule/plaque c/w seborrheic keratosis      Yellow umbilicated papule c/w sebaceous hyperplasia      Irregularly shaped tan macule c/w lentigo     1-2 mm smooth white papules consistent with Milia      Movable subcutaneous cyst with punctum c/w epidermal inclusion cyst      Subcutaneous movable cyst c/w pilar cyst      Firm pink to brown papule c/w dermatofibroma      Pedunculated fleshy papule(s) c/w skin tag(s)      Evenly pigmented macule c/w junctional nevus     Mildly variegated pigmented, slightly irregular-bordered macule c/w mildly atypical nevus      Flesh colored to evenly pigmented papule c/w intradermal nevus       Pink pearly papule/plaque c/w basal  cell carcinoma      Erythematous hyperkeratotic cursted plaque c/w SCC      Surgical scar with no sign of skin cancer recurrence      Open and closed comedones      Inflammatory papules and pustules      Verrucoid papule consistent consistent with wart     Erythematous eczematous patches and plaques     Dystrophic onycholytic nail with subungual debris c/w onychomycosis     Umbilicated papule    Erythematous-base heme-crusted tan verrucoid plaque consistent with inflamed seborrheic keratosis     Erythematous Silvery Scaling Plaque c/w Psoriasis     See annotation              Assessment / Plan:      Pathology Orders:     Normal Orders This Visit    Tissue Specimen To Pathology, Dermatology     Questions:    Directional Terms:  Other(comment)    Clinical information:  r/o dysplastic nevus    Specific Site:  left lower back        Neoplasm of uncertain behavior of skin  -     Tissue Specimen To Pathology, Dermatology    Shave biopsy procedure note:    Shave biopsy performed after verbal consent including risk of infection, scar, recurrence, need for additional treatment of site. Area prepped with alcohol, anesthetized with approximately 1.0cc of 1% lidocaine with epinephrine. Lesional tissue shaved with razor blade. Hemostasis achieved with application of aluminum chloride followed by hyfrecation. No complications. Dressing applied. Wound care explained.    If biopsy positive, schedule procedure for definitive excision.     Seborrheic keratoses  These are benign inherited growths without a malignant potential. Reassurance given to patient. No treatment is necessary.     Multiple benign nevi  upper body skin examination performed today including at least 6 points as noted in physical examination. Discussed ABCDE's of moles and brochure provided.    Screening, malignant neoplasm, skin  Upper body skin examination performed today including at least 6 points as noted in physical examination. Suspicious lesions noted.          Follow-up in about 6 months (around 6/4/2019).

## 2018-12-06 ENCOUNTER — HOSPITAL ENCOUNTER (OUTPATIENT)
Dept: RADIOLOGY | Facility: HOSPITAL | Age: 68
Discharge: HOME | End: 2018-12-06
Attending: FAMILY MEDICINE
Payer: COMMERCIAL

## 2018-12-06 ENCOUNTER — OFFICE VISIT (OUTPATIENT)
Dept: INTERNAL MEDICINE | Facility: CLINIC | Age: 68
End: 2018-12-06
Payer: COMMERCIAL

## 2018-12-06 VITALS
SYSTOLIC BLOOD PRESSURE: 118 MMHG | WEIGHT: 203.5 LBS | HEART RATE: 73 BPM | HEIGHT: 69 IN | BODY MASS INDEX: 30.14 KG/M2 | TEMPERATURE: 99 F | DIASTOLIC BLOOD PRESSURE: 74 MMHG | OXYGEN SATURATION: 98 %

## 2018-12-06 DIAGNOSIS — R07.89 RIGHT-SIDED CHEST WALL PAIN: Primary | ICD-10-CM

## 2018-12-06 DIAGNOSIS — R07.89 RIGHT-SIDED CHEST WALL PAIN: ICD-10-CM

## 2018-12-06 PROCEDURE — 71100 X-RAY EXAM RIBS UNI 2 VIEWS: CPT | Mod: 26,,, | Performed by: RADIOLOGY

## 2018-12-06 PROCEDURE — 71100 X-RAY EXAM RIBS UNI 2 VIEWS: CPT | Mod: TC,FY,PO

## 2018-12-06 PROCEDURE — 99214 OFFICE O/P EST MOD 30 MIN: CPT | Mod: S$GLB,,, | Performed by: FAMILY MEDICINE

## 2018-12-06 PROCEDURE — 1101F PT FALLS ASSESS-DOCD LE1/YR: CPT | Mod: CPTII,S$GLB,, | Performed by: FAMILY MEDICINE

## 2018-12-06 PROCEDURE — 99999 PR PBB SHADOW E&M-EST. PATIENT-LVL III: CPT | Mod: PBBFAC,,, | Performed by: FAMILY MEDICINE

## 2018-12-06 RX ORDER — METHOCARBAMOL 500 MG/1
500 TABLET, FILM COATED ORAL 3 TIMES DAILY PRN
Qty: 30 TABLET | Refills: 0 | Status: SHIPPED | OUTPATIENT
Start: 2018-12-06 | End: 2018-12-16

## 2018-12-06 NOTE — PROGRESS NOTES
Subjective:       Patient ID: Olivier Cuenca is a 68 y.o. male.    Chief Complaint: No chief complaint on file.    HPIlast Saturday sneezed and sudden r lower chest wall pain. Lingered but improved by weekend. Another sneeze or cough recently and painful.some pain w breathing. No other cp. No sob. No freq sneezing or cough; prnaleve using;no abd pain;not constant pain just w movements etc    Past Medical History:   Diagnosis Date    BPH (benign prostatic hyperplasia)     Erectile dysfunction     Ex-smoker     quit 1980s    Fatty liver     Herpes simplex     History of poliomyelitis     Migraine     Thalassemia minor      Past Surgical History:   Procedure Laterality Date    APPENDECTOMY      CHOLECYSTECTOMY      COLONOSCOPY N/A 2/28/2018    Procedure: COLONOSCOPY;  Surgeon: Bo Moreno MD;  Location: [facility];  Service: Endoscopy;  Laterality: N/A;  Pt requesting no anesthesia - no sedation.  Will not have .    COLONOSCOPY N/A 2/28/2018    [facility]    COLONOSCOPY N/A 3/13/2015    [facility] by Robert León [facility] at [facility]    ESOPHAGOGASTRODUODENOSCOPY (EGD) N/A 10/18/2016    [facility] Robert [facility] at [facility]    HERNIA REPAIR       Family History   Problem Relation Age of Onset    Dementia Mother     Hyperlipidemia Father     Melanoma Neg Hx      Social History     Socioeconomic History    Marital status: Single     Spouse name: None    Number of children: 2    Years of education: None    Highest education level: None   Social Needs    Financial resource strain: None    Food insecurity - worry: None    Food insecurity - inability: None    Transportation needs - medical: None    Transportation needs - non-medical: None   Occupational History    Occupation:      Employer: [employer]     Comment: [employer]   Tobacco Use    Smoking status: Former Smoker     Start date:  3/13/1964     Last attempt to quit: 3/13/1985     Years since quittin.7    Smokeless tobacco: Never Used   Substance and Sexual Activity    Alcohol use: No    Drug use: No    Sexual activity: None   Other Topics Concern    None   Social History Narrative    None         Review of Systems    Objective:      Physical Exam   Constitutional: He is oriented to person, place, and time. He appears well-developed and well-nourished.   HENT:   Head: Normocephalic and atraumatic.   Neck: Normal range of motion. Neck supple. Carotid bruit is not present.   Cardiovascular: Normal rate and regular rhythm.   Pulmonary/Chest: Effort normal and breath sounds normal.   Abdominal: Soft. Bowel sounds are normal. He exhibits no distension and no mass. There is no tenderness. There is no rebound and no guarding.   Lymphadenopathy:     He has no cervical adenopathy.   Neurological: He is alert and oriented to person, place, and time.   Skin: Skin is warm and dry.   Psychiatric: He has a normal mood and affect. His behavior is normal. Judgment normal.   Nursing note and vitals reviewed.    ttp r lower lat chst wall. No rash. No bruise . No crepitus  Assessment:       1. Right-sided chest wall pain        Plan:       *splinting precautions  Prn aleve. musc relaxer sedation precaut  If no better 1-2 weeks followup sooner if any worsening or change in symptoms or lack of resolution  **  Right-sided chest wall pain  -     X-Ray Ribs 2 View Right; Future; Expected date: 2018    Other orders  -     methocarbamol (ROBAXIN) 500 MG Tab; Take 1 tablet (500 mg total) by mouth 3 (three) times daily as needed.  Dispense: 30 tablet; Refill: 0

## 2018-12-13 ENCOUNTER — HOSPITAL ENCOUNTER (OUTPATIENT)
Dept: RADIOLOGY | Facility: HOSPITAL | Age: 68
Discharge: HOME | End: 2018-12-13
Attending: FAMILY MEDICINE
Payer: COMMERCIAL

## 2018-12-13 DIAGNOSIS — R91.1 LUNG NODULE: ICD-10-CM

## 2018-12-13 PROCEDURE — 71250 CT THORAX DX C-: CPT | Mod: TC,PO

## 2018-12-13 PROCEDURE — 71250 CT THORAX DX C-: CPT | Mod: 26,,, | Performed by: RADIOLOGY

## 2018-12-19 ENCOUNTER — PATIENT MESSAGE (OUTPATIENT)
Dept: DERMATOLOGY | Facility: CLINIC | Age: 68
End: 2018-12-19

## 2018-12-19 NOTE — TELEPHONE ENCOUNTER
Pt informed of normal mole and no further work up is needed.  Pt verbalized understanding of information given and had no further questions.

## 2019-05-23 RX ORDER — TADALAFIL 5 MG/1
TABLET, FILM COATED ORAL
Qty: 30 TABLET | Refills: 11 | Status: SHIPPED | OUTPATIENT
Start: 2019-05-23 | End: 2020-06-26 | Stop reason: SDUPTHER

## 2019-06-19 ENCOUNTER — TELEPHONE (OUTPATIENT)
Dept: RADIOLOGY | Facility: HOSPITAL | Age: 69
End: 2019-06-19

## 2019-06-20 ENCOUNTER — HOSPITAL ENCOUNTER (OUTPATIENT)
Dept: RADIOLOGY | Facility: HOSPITAL | Age: 69
Discharge: HOME | End: 2019-06-20
Attending: UROLOGY
Payer: COMMERCIAL

## 2019-06-20 DIAGNOSIS — N40.0 BENIGN PROSTATIC HYPERPLASIA, UNSPECIFIED WHETHER LOWER URINARY TRACT SYMPTOMS PRESENT: ICD-10-CM

## 2019-06-20 DIAGNOSIS — N28.1 RENAL CYST: ICD-10-CM

## 2019-06-20 PROCEDURE — 76770 US EXAM ABDO BACK WALL COMP: CPT | Mod: TC

## 2019-06-20 PROCEDURE — 76770 US RETROPERITONEAL COMPLETE: ICD-10-PCS | Mod: 26,,, | Performed by: RADIOLOGY

## 2019-06-20 PROCEDURE — 76770 US EXAM ABDO BACK WALL COMP: CPT | Mod: 26,,, | Performed by: RADIOLOGY

## 2019-06-26 ENCOUNTER — OFFICE VISIT (OUTPATIENT)
Dept: UROLOGY | Facility: CLINIC | Age: 69
End: 2019-06-26
Payer: COMMERCIAL

## 2019-06-26 DIAGNOSIS — N28.1 RENAL CYST: Primary | ICD-10-CM

## 2019-06-26 DIAGNOSIS — N40.0 BENIGN PROSTATIC HYPERPLASIA, UNSPECIFIED WHETHER LOWER URINARY TRACT SYMPTOMS PRESENT: ICD-10-CM

## 2019-06-26 PROCEDURE — 1101F PT FALLS ASSESS-DOCD LE1/YR: CPT | Mod: CPTII,S$GLB,, | Performed by: UROLOGY

## 2019-06-26 PROCEDURE — 1101F PR PT FALLS ASSESS DOC 0-1 FALLS W/OUT INJ PAST YR: ICD-10-PCS | Mod: CPTII,S$GLB,, | Performed by: UROLOGY

## 2019-06-26 PROCEDURE — 99214 OFFICE O/P EST MOD 30 MIN: CPT | Mod: S$GLB,,, | Performed by: UROLOGY

## 2019-06-26 PROCEDURE — 99214 PR OFFICE/OUTPT VISIT, EST, LEVL IV, 30-39 MIN: ICD-10-PCS | Mod: S$GLB,,, | Performed by: UROLOGY

## 2019-06-26 NOTE — PROGRESS NOTES
Chief Complaint: Renal lesion?    HPI:   6/26/19: Left complex cyst smaller than last year, and a right 1cm cyst evident now.  No worrisome changes.  Voiding okay.  Reviewed history in detail.  cialis helping with BPH symptoms.  6/25/18: 68 yo man referred by Dr. Giang for findings on CT done for right flank pain. CT showed 3cm left simple renal cysts and distended bladder.  US shows cysts mildly complex.  Frequency, weak stream, long time to empty, not much nocturia. No abd/pelvic pain and no exac/rel factors.  No hematuria.  No urolithiasis.  No  history.  Occasional cialis for ED thinks it helps him void better.    Allergies:  Codeine and Codeine sulfate    Medications:  has a current medication list which includes the following prescription(s): cialis, clindamycin, ranitidine, ranitidine, and valacyclovir.    Review of Systems:  General: No fever, chills, fatigability, or weight loss.  Skin: No rashes, itching, or changes in color or texture of skin.  Chest: Denies KAN, cyanosis, wheezing, cough, and sputum production.  Abdomen: Appetite fine. No weight loss. Denies diarrhea, abdominal pain, hematemesis, or blood in stool.  Musculoskeletal: No joint stiffness or swelling. Denies back pain.  Wears brace uses stick infantile polio.  : As above.  All other review of systems negative.    PMH:   has a past medical history of BPH (benign prostatic hyperplasia), Erectile dysfunction, Ex-smoker, Fatty liver, Herpes simplex, History of poliomyelitis, Migraine, and Thalassemia minor.    PSH:   has a past surgical history that includes Cholecystectomy; Appendectomy; Hernia repair; and Colonoscopy (N/A, 2/28/2018).    FamHx: family history includes Dementia in his mother; Hyperlipidemia in his father.    SocHx:  reports that he quit smoking about 34 years ago. He started smoking about 55 years ago. He has never used smokeless tobacco. He reports that he does not drink alcohol or use drugs.      Physical Exam:  There  were no vitals filed for this visit.  General: A&Ox3, no apparent distress, no deformities  Neck: No masses, normal thyroid  Lungs: normal inspiration, no use of accessory muscles  Heart: normal pulse, no arrhythmias  Abdomen: Soft, NT, ND  Skin: The skin is warm and dry. No jaundice.  Ext: No c/c/e.  :   6/18: Test desc vidya, no abnormalities of epididymus. Penis normal, with normal penile and scrotal skin. Meatus normal. Normal rectal tone, no hemorrhoids. Prost 40 gm no nodules or masses appreciated. SV not palpable. Perineum and anus normal.    Labs/Studies:   Urinalysis performed in clinic, summary: UA normal  PSA    1/18: 2.4  Bladder Scan performed in office:     6/18: PVR 32 ml.    Impression/Plan:   1. HTN: discussed and referred to PCP for further management.  2. Seems to void okay  3. US/RTC 1 year.

## 2019-08-19 ENCOUNTER — OFFICE VISIT (OUTPATIENT)
Dept: INTERNAL MEDICINE | Facility: CLINIC | Age: 69
End: 2019-08-19
Payer: COMMERCIAL

## 2019-08-19 VITALS
HEIGHT: 69 IN | TEMPERATURE: 98 F | WEIGHT: 179.88 LBS | SYSTOLIC BLOOD PRESSURE: 124 MMHG | DIASTOLIC BLOOD PRESSURE: 68 MMHG | BODY MASS INDEX: 26.64 KG/M2

## 2019-08-19 DIAGNOSIS — N40.0 BENIGN PROSTATIC HYPERPLASIA, UNSPECIFIED WHETHER LOWER URINARY TRACT SYMPTOMS PRESENT: ICD-10-CM

## 2019-08-19 DIAGNOSIS — M89.662 POLIOMYELITIS OSTEOPATHY OF LEFT LOWER LEG: ICD-10-CM

## 2019-08-19 DIAGNOSIS — B91 POLIOMYELITIS OSTEOPATHY OF LEFT LOWER LEG: ICD-10-CM

## 2019-08-19 DIAGNOSIS — G44.209 TENSION-TYPE HEADACHE, NOT INTRACTABLE, UNSPECIFIED CHRONICITY PATTERN: Primary | ICD-10-CM

## 2019-08-19 DIAGNOSIS — K76.0 FATTY LIVER: ICD-10-CM

## 2019-08-19 DIAGNOSIS — K21.9 GASTROESOPHAGEAL REFLUX DISEASE, ESOPHAGITIS PRESENCE NOT SPECIFIED: ICD-10-CM

## 2019-08-19 PROCEDURE — 99999 PR PBB SHADOW E&M-EST. PATIENT-LVL III: CPT | Mod: PBBFAC,,, | Performed by: FAMILY MEDICINE

## 2019-08-19 PROCEDURE — 90471 PNEUMOCOCCAL POLYSACCHARIDE VACCINE 23-VALENT =>2YO SQ IM: ICD-10-PCS | Mod: S$GLB,,, | Performed by: FAMILY MEDICINE

## 2019-08-19 PROCEDURE — 99214 OFFICE O/P EST MOD 30 MIN: CPT | Mod: 25,S$GLB,, | Performed by: FAMILY MEDICINE

## 2019-08-19 PROCEDURE — 1101F PR PT FALLS ASSESS DOC 0-1 FALLS W/OUT INJ PAST YR: ICD-10-PCS | Mod: CPTII,S$GLB,, | Performed by: FAMILY MEDICINE

## 2019-08-19 PROCEDURE — 90732 PNEUMOCOCCAL POLYSACCHARIDE VACCINE 23-VALENT =>2YO SQ IM: ICD-10-PCS | Mod: S$GLB,,, | Performed by: FAMILY MEDICINE

## 2019-08-19 PROCEDURE — 90732 PPSV23 VACC 2 YRS+ SUBQ/IM: CPT | Mod: S$GLB,,, | Performed by: FAMILY MEDICINE

## 2019-08-19 PROCEDURE — 99999 PR PBB SHADOW E&M-EST. PATIENT-LVL III: ICD-10-PCS | Mod: PBBFAC,,, | Performed by: FAMILY MEDICINE

## 2019-08-19 PROCEDURE — 1101F PT FALLS ASSESS-DOCD LE1/YR: CPT | Mod: CPTII,S$GLB,, | Performed by: FAMILY MEDICINE

## 2019-08-19 PROCEDURE — 99214 PR OFFICE/OUTPT VISIT, EST, LEVL IV, 30-39 MIN: ICD-10-PCS | Mod: 25,S$GLB,, | Performed by: FAMILY MEDICINE

## 2019-08-19 PROCEDURE — 90471 IMMUNIZATION ADMIN: CPT | Mod: S$GLB,,, | Performed by: FAMILY MEDICINE

## 2019-08-19 NOTE — PROGRESS NOTES
Subjective:       Patient ID: Olivier Cuenca is a 68 y.o. male.    Chief Complaint: Headache    HPI10 days top and back of head and post neck pain. No neurologic sympt. 4/10 . Intermittent.doesnt occur in am. No awakening headache No nasal congestion. Corresponds with rcent increase stressors (daught health issue, large purchase property) ; mood is good. When busy no headache  gerd asympt off meds since wt loss  Vol wt loss: since winter stopped bread, rice etc and near     Past Medical History:   Diagnosis Date    BPH (benign prostatic hyperplasia)     Erectile dysfunction     Ex-smoker     quit     Fatty liver     Herpes simplex     History of poliomyelitis     Migraine     Thalassemia minor      Past Surgical History:   Procedure Laterality Date    APPENDECTOMY      CHOLECYSTECTOMY      COLONOSCOPY N/A 2018    [facility] at [facility] ENDO    COLONOSCOPY N/A 3/13/2015    Performed by Robert León [facility] at [facility] ENDO    ESOPHAGOGASTRODUODENOSCOPY (EGD) N/A 10/18/2016    Performed by Robert León [facility] at [facility] ENDO    HERNIA REPAIR       Family History   Problem Relation Age of Onset    Dementia Mother     Hyperlipidemia Father     Melanoma Neg Hx      Social History     Socioeconomic History    Marital status: Single     Spouse name: Not on file    Number of children: 2    Years of education: Not on file    Highest education level: Not on file   Occupational History    Occupation:      Employer: [employer]     Comment: [employer]   Social Needs    Financial resource strain: Not on file    Food insecurity:     Worry: Not on file     Inability: Not on file    Transportation needs:     Medical: Not on file     Non-medical: Not on file   Tobacco Use    Smoking status: Former Smoker     Start date: 3/13/1964     Last attempt to quit: 3/13/1985     Years since quittin.4    Smokeless tobacco: Never Used    Substance and Sexual Activity    Alcohol use: No    Drug use: No    Sexual activity: Not on file   Lifestyle    Physical activity:     Days per week: Not on file     Minutes per session: Not on file    Stress: Not on file   Relationships    Social connections:     Talks on phone: Not on file     Gets together: Not on file     Attends Mosque service: Not on file     Active member of club or organization: Not on file     Attends meetings of clubs or organizations: Not on file     Relationship status: Not on file   Other Topics Concern    Not on file   Social History Narrative    Not on file       Review of Systems    Objective:      Physical Exam   Constitutional: He is oriented to person, place, and time. He appears well-developed and well-nourished.   HENT:   Head: Normocephalic and atraumatic.   Right Ear: External ear normal.   Left Ear: External ear normal.   Nose: Nose normal.   Mouth/Throat: Oropharynx is clear and moist. No oropharyngeal exudate.   Nl tms   Eyes: Pupils are equal, round, and reactive to light. Conjunctivae and EOM are normal.   Neck: Normal range of motion. Neck supple. Carotid bruit is not present.   Cardiovascular: Normal rate and regular rhythm.   Pulmonary/Chest: Effort normal and breath sounds normal.   Lymphadenopathy:     He has no cervical adenopathy.   Neurological: He is alert and oriented to person, place, and time. No cranial nerve deficit. Coordination normal.   Skin: Skin is warm and dry.   Psychiatric: He has a normal mood and affect. His behavior is normal. Judgment and thought content normal.     motor bilat ue 5/5 r le 5/5 (lle weak chronic c/w hx polio)  Assessment:           2. Tension-type headache, not intractable, unspecified chronicity pattern    3. Poliomyelitis osteopathy of left lower leg    4. Gastroesophageal reflux disease, esophagitis presence not specified    5. Fatty liver    6. Benign prostatic hyperplasia, unspecified whether lower urinary tract  symptoms present        Plan:       **he declines cscope til 2/21 (did cscope w/o anest and saw only a small spot covered w stool)*  ;will change hm to 2/21 due  Tension-type headache, not intractable, unspecified chronicity pattern    Poliomyelitis osteopathy of left lower leg    Gastroesophageal reflux disease, esophagitis presence not specified    Fatty liver    Benign prostatic hyperplasia, unspecified whether lower urinary tract symptoms present    Other orders  -     (In Office Administered) Pneumococcal Polysaccharide Vaccine (23 Valent) (SQ/IM)    d/wd likely tension headache but if not gone in two weeks or if any change then sooner

## 2019-08-29 ENCOUNTER — TELEPHONE (OUTPATIENT)
Dept: INTERNAL MEDICINE | Facility: CLINIC | Age: 69
End: 2019-08-29

## 2019-08-29 DIAGNOSIS — G44.89 CHRONIC MIXED HEADACHE SYNDROME: ICD-10-CM

## 2019-08-29 NOTE — TELEPHONE ENCOUNTER
----- Message from Wilda Garcia sent at 8/29/2019  9:20 AM CDT -----  Contact: Patient  Patient states that Dr Giang told him that if his headaches continued to call him and an MRI would be set up. Please call him back at [phone]. Thank you

## 2019-09-03 ENCOUNTER — TELEPHONE (OUTPATIENT)
Dept: INTERNAL MEDICINE | Facility: CLINIC | Age: 69
End: 2019-09-03

## 2019-09-03 NOTE — TELEPHONE ENCOUNTER
Still having headache  He requests mri brain  Ordered with preceding serum creatinin  Please let him know

## 2019-09-04 ENCOUNTER — LAB VISIT (OUTPATIENT)
Dept: LAB | Facility: HOSPITAL | Age: 69
End: 2019-09-04
Attending: FAMILY MEDICINE
Payer: COMMERCIAL

## 2019-09-04 DIAGNOSIS — G44.89 CHRONIC MIXED HEADACHE SYNDROME: ICD-10-CM

## 2019-09-04 LAB
CREAT SERPL-MCNC: 0.8 MG/DL (ref 0.5–1.4)
EST. GFR  (AFRICAN AMERICAN): >60 ML/MIN/1.73 M^2
EST. GFR  (NON AFRICAN AMERICAN): >60 ML/MIN/1.73 M^2

## 2019-09-04 PROCEDURE — 36415 COLL VENOUS BLD VENIPUNCTURE: CPT | Mod: PO

## 2019-09-04 PROCEDURE — 82565 ASSAY OF CREATININE: CPT

## 2019-09-04 NOTE — TELEPHONE ENCOUNTER
----- Message from Conrad Stevenson sent at 9/4/2019  9:11 AM CDT -----  Contact: Pt   Type:  Sooner Apoointment Request    Caller is requesting a sooner appointment.  Caller declined first available appointment listed below.  Caller will not accept being placed on the waitlist and is requesting a message be sent to doctor.  Name of Caller: PT   When is the first available appointment? n/a  Symptoms:Severe Headaches/possible MRI needed  Would the patient rather a call back or a response via [facility]? Call back   Best Call Back Number: [phone] (home)   Additional Information: He is stating that he left a message last Thursday concerning his persistent head pain and would like to get orders placed for a MRI and be fit into the schedule as soon as possible,please advise as soon as possible.

## 2019-09-04 NOTE — TELEPHONE ENCOUNTER
Patient notified lab scheduled in [facility] today and mri tomorrow at 11:45am in [place]

## 2019-09-05 ENCOUNTER — HOSPITAL ENCOUNTER (OUTPATIENT)
Dept: RADIOLOGY | Facility: HOSPITAL | Age: 69
Discharge: HOME | End: 2019-09-05
Attending: FAMILY MEDICINE
Payer: COMMERCIAL

## 2019-09-05 DIAGNOSIS — G44.89 CHRONIC MIXED HEADACHE SYNDROME: ICD-10-CM

## 2019-09-05 PROCEDURE — 70553 MRI BRAIN STEM W/O & W/DYE: CPT | Mod: 26,,, | Performed by: RADIOLOGY

## 2019-09-05 PROCEDURE — 70553 MRI BRAIN W WO CONTRAST: ICD-10-PCS | Mod: 26,,, | Performed by: RADIOLOGY

## 2019-09-05 PROCEDURE — 70553 MRI BRAIN STEM W/O & W/DYE: CPT | Mod: TC,PO

## 2019-09-05 RX ORDER — GADOBUTROL 604.72 MG/ML
8 INJECTION INTRAVENOUS
Status: COMPLETED | OUTPATIENT
Start: 2019-09-05 | End: 2019-09-05

## 2019-09-05 RX ADMIN — GADOBUTROL 8 ML: 604.72 INJECTION INTRAVENOUS at 12:09

## 2019-09-19 ENCOUNTER — TELEPHONE (OUTPATIENT)
Dept: INTERNAL MEDICINE | Facility: CLINIC | Age: 69
End: 2019-09-19

## 2019-09-19 NOTE — TELEPHONE ENCOUNTER
----- Message from Marsha Cantrell sent at 9/19/2019  8:07 AM CDT -----  Contact: pt  He's calling in regards to being worked into schedule for headaches, pls call pt back at [phone] (home)

## 2019-09-24 ENCOUNTER — OFFICE VISIT (OUTPATIENT)
Dept: INTERNAL MEDICINE | Facility: CLINIC | Age: 69
End: 2019-09-24
Payer: COMMERCIAL

## 2019-09-24 VITALS
SYSTOLIC BLOOD PRESSURE: 118 MMHG | HEART RATE: 76 BPM | HEIGHT: 69 IN | OXYGEN SATURATION: 98 % | BODY MASS INDEX: 27 KG/M2 | TEMPERATURE: 98 F | WEIGHT: 182.31 LBS | DIASTOLIC BLOOD PRESSURE: 82 MMHG

## 2019-09-24 DIAGNOSIS — R51.9 OCCIPITAL HEADACHE: Primary | ICD-10-CM

## 2019-09-24 PROCEDURE — 99999 PR PBB SHADOW E&M-EST. PATIENT-LVL III: CPT | Mod: PBBFAC,,, | Performed by: FAMILY MEDICINE

## 2019-09-24 PROCEDURE — 99999 PR PBB SHADOW E&M-EST. PATIENT-LVL III: ICD-10-PCS | Mod: PBBFAC,,, | Performed by: FAMILY MEDICINE

## 2019-09-24 PROCEDURE — 1101F PR PT FALLS ASSESS DOC 0-1 FALLS W/OUT INJ PAST YR: ICD-10-PCS | Mod: CPTII,S$GLB,, | Performed by: FAMILY MEDICINE

## 2019-09-24 PROCEDURE — 99214 OFFICE O/P EST MOD 30 MIN: CPT | Mod: S$GLB,,, | Performed by: FAMILY MEDICINE

## 2019-09-24 PROCEDURE — 1101F PT FALLS ASSESS-DOCD LE1/YR: CPT | Mod: CPTII,S$GLB,, | Performed by: FAMILY MEDICINE

## 2019-09-24 PROCEDURE — 99214 PR OFFICE/OUTPT VISIT, EST, LEVL IV, 30-39 MIN: ICD-10-PCS | Mod: S$GLB,,, | Performed by: FAMILY MEDICINE

## 2019-09-24 RX ORDER — TIZANIDINE 4 MG/1
4 TABLET ORAL EVERY 8 HOURS PRN
Qty: 30 TABLET | Refills: 0 | Status: SHIPPED | OUTPATIENT
Start: 2019-09-24 | End: 2019-10-04

## 2019-09-24 NOTE — PROGRESS NOTES
Subjective:   Patient ID: Olivier Cuenca is a 68 y.o. male.  Chief Complaint:  Headache and Dizziness      PCP Dr. Giang.    Presents follow-up on headaches.    Previously evaluated by Dr. Giang and fill tension/ stress induced in nature.    No medications prescribed.    Based on persistent headaches, patient called was scheduled for MRI.    MRI done was normal/unremarkable.    Presents to make sure no other additional testing needed.    Headache is more occipital in nature.  More unilateral right side than left side.  Worse with movement.    Revolves when distracted.    Improved with NSAIDs, but does not completely go away.  Unclear frequency, but appears more than 2-3 times per week.  No other constitutional symptoms with headache.    Headache    This is a chronic problem. The current episode started more than 1 month ago. The problem occurs intermittently. The problem has been waxing and waning. The pain is located in the occipital region. The pain does not radiate. The pain quality is similar to prior headaches. The quality of the pain is described as shooting, stabbing and aching. The pain is at a severity of 5/10. The pain is moderate. Pertinent negatives include no abdominal pain, abnormal behavior, anorexia, back pain, blurred vision, coughing, dizziness, drainage, ear pain, eye pain, eye redness, eye watering, facial sweating, fever, hearing loss, insomnia, loss of balance, muscle aches, nausea, neck pain, numbness, phonophobia, photophobia, rhinorrhea, scalp tenderness, seizures, sinus pressure, sore throat, swollen glands, tingling, tinnitus, visual change, vomiting, weakness or weight loss. The symptoms are aggravated by emotional stress. He has tried NSAIDs for the symptoms. The treatment provided mild relief.     Review of Systems   Constitutional: Negative for chills, fatigue, fever and weight loss.   HENT: Negative for congestion, ear discharge, ear pain, hearing loss, postnasal drip, rhinorrhea,  "sinus pressure, sinus pain, sneezing, sore throat, tinnitus and trouble swallowing.    Eyes: Negative for blurred vision, photophobia, pain, redness and visual disturbance.   Respiratory: Negative for cough, chest tightness, shortness of breath and wheezing.    Cardiovascular: Negative for chest pain, palpitations and leg swelling.   Gastrointestinal: Negative for abdominal pain, anorexia, constipation, diarrhea, nausea and vomiting.   Musculoskeletal: Negative for back pain, myalgias and neck pain.   Skin: Negative for rash.   Neurological: Positive for headaches. Negative for dizziness, tingling, tremors, seizures, syncope, facial asymmetry, speech difficulty, weakness, light-headedness, numbness and loss of balance.   Hematological: Negative for adenopathy.   Psychiatric/Behavioral: Negative for agitation, behavioral problems, confusion, decreased concentration, dysphoric mood, hallucinations, self-injury, sleep disturbance and suicidal ideas. The patient is not nervous/anxious, does not have insomnia and is not hyperactive.      Objective:   /82 (BP Location: Right arm, Patient Position: Sitting, BP Method: Large (Manual))   Pulse 76   Temp 97.6 °F (36.4 °C) (Tympanic)   Ht 5' 9" (1.753 m)   Wt 82.7 kg (182 lb 5.1 oz)   SpO2 98%   BMI 26.92 kg/m²     Physical Exam   Constitutional: He is oriented to person, place, and time. Vital signs are normal. He appears well-developed and well-nourished.   HENT:   Head: Normocephalic and atraumatic.   Nose: Nose normal. Right sinus exhibits no maxillary sinus tenderness and no frontal sinus tenderness. Left sinus exhibits no maxillary sinus tenderness and no frontal sinus tenderness.   Eyes: Pupils are equal, round, and reactive to light. Conjunctivae and EOM are normal.   Neck: Normal range of motion and full passive range of motion without pain. Neck supple. No JVD present. Carotid bruit is not present. No thyroid mass and no thyromegaly present. "   Cardiovascular: Normal rate, regular rhythm and normal heart sounds. Exam reveals no gallop and no friction rub.   No murmur heard.  Pulmonary/Chest: Effort normal and breath sounds normal. He has no wheezes. He has no rhonchi. He has no rales.   Abdominal: Soft. He exhibits no distension. There is no tenderness. There is no rebound, no guarding and no CVA tenderness.   Musculoskeletal: He exhibits no edema.   Lymphadenopathy:     He has no cervical adenopathy.   Neurological: He is alert and oriented to person, place, and time. He has normal strength. He displays a negative Romberg sign. Coordination and gait normal.   Skin: Skin is warm, dry and intact. No abrasion, no bruising, no burn, no ecchymosis, no laceration and no rash noted.   Psychiatric: He has a normal mood and affect. His speech is normal and behavior is normal. Judgment and thought content normal. He is not actively hallucinating. Cognition and memory are normal. He is attentive.   Nursing note and vitals reviewed.    Assessment:       ICD-10-CM ICD-9-CM   1. Occipital headache R51 784.0     Plan:   Occipital headache  -     tiZANidine (ZANAFLEX) 4 MG tablet; Take 1 tablet (4 mg total) by mouth every 8 (eight) hours as needed (Headache).  Dispense: 30 tablet; Refill: 0    Either tension/ stress related versus neuralgia related.    Trial of Zanaflex.    If no help, consider gabapentin  Return to clinic 2 weeks

## 2019-11-06 ENCOUNTER — LAB VISIT (OUTPATIENT)
Dept: LAB | Facility: HOSPITAL | Age: 69
End: 2019-11-06
Payer: COMMERCIAL

## 2019-11-06 DIAGNOSIS — Z00.00 ROUTINE HEALTH MAINTENANCE: ICD-10-CM

## 2019-11-06 LAB
ALBUMIN SERPL BCP-MCNC: 3.8 G/DL (ref 3.5–5.2)
ALP SERPL-CCNC: 98 U/L (ref 55–135)
ALT SERPL W/O P-5'-P-CCNC: 23 U/L (ref 10–44)
ANION GAP SERPL CALC-SCNC: 6 MMOL/L (ref 8–16)
AST SERPL-CCNC: 20 U/L (ref 10–40)
BASOPHILS # BLD AUTO: 0.04 K/UL (ref 0–0.2)
BASOPHILS NFR BLD: 0.8 % (ref 0–1.9)
BILIRUB SERPL-MCNC: 0.6 MG/DL (ref 0.1–1)
BUN SERPL-MCNC: 16 MG/DL (ref 8–23)
CALCIUM SERPL-MCNC: 9.8 MG/DL (ref 8.7–10.5)
CHLORIDE SERPL-SCNC: 108 MMOL/L (ref 95–110)
CHOLEST SERPL-MCNC: 141 MG/DL (ref 120–199)
CHOLEST/HDLC SERPL: 2.9 {RATIO} (ref 2–5)
CO2 SERPL-SCNC: 29 MMOL/L (ref 23–29)
COMPLEXED PSA SERPL-MCNC: 1.6 NG/ML (ref 0–4)
CREAT SERPL-MCNC: 0.7 MG/DL (ref 0.5–1.4)
DIFFERENTIAL METHOD: NORMAL
EOSINOPHIL # BLD AUTO: 0.1 K/UL (ref 0–0.5)
EOSINOPHIL NFR BLD: 2.5 % (ref 0–8)
ERYTHROCYTE [DISTWIDTH] IN BLOOD BY AUTOMATED COUNT: 12.5 % (ref 11.5–14.5)
EST. GFR  (AFRICAN AMERICAN): >60 ML/MIN/1.73 M^2
EST. GFR  (NON AFRICAN AMERICAN): >60 ML/MIN/1.73 M^2
GLUCOSE SERPL-MCNC: 77 MG/DL (ref 70–110)
HCT VFR BLD AUTO: 48.1 % (ref 40–54)
HDLC SERPL-MCNC: 49 MG/DL (ref 40–75)
HDLC SERPL: 34.8 % (ref 20–50)
HGB BLD-MCNC: 15.6 G/DL (ref 14–18)
IMM GRANULOCYTES # BLD AUTO: 0.01 K/UL (ref 0–0.04)
IMM GRANULOCYTES NFR BLD AUTO: 0.2 % (ref 0–0.5)
LDLC SERPL CALC-MCNC: 77.2 MG/DL (ref 63–159)
LYMPHOCYTES # BLD AUTO: 1.4 K/UL (ref 1–4.8)
LYMPHOCYTES NFR BLD: 27.1 % (ref 18–48)
MCH RBC QN AUTO: 30.2 PG (ref 27–31)
MCHC RBC AUTO-ENTMCNC: 32.4 G/DL (ref 32–36)
MCV RBC AUTO: 93 FL (ref 82–98)
MONOCYTES # BLD AUTO: 0.5 K/UL (ref 0.3–1)
MONOCYTES NFR BLD: 9.9 % (ref 4–15)
NEUTROPHILS # BLD AUTO: 3.1 K/UL (ref 1.8–7.7)
NEUTROPHILS NFR BLD: 59.5 % (ref 38–73)
NONHDLC SERPL-MCNC: 92 MG/DL
NRBC BLD-RTO: 0 /100 WBC
PLATELET # BLD AUTO: 193 K/UL (ref 150–350)
PMV BLD AUTO: 11.3 FL (ref 9.2–12.9)
POTASSIUM SERPL-SCNC: 5.2 MMOL/L (ref 3.5–5.1)
PROT SERPL-MCNC: 7 G/DL (ref 6–8.4)
RBC # BLD AUTO: 5.16 M/UL (ref 4.6–6.2)
SODIUM SERPL-SCNC: 143 MMOL/L (ref 136–145)
TRIGL SERPL-MCNC: 74 MG/DL (ref 30–150)
WBC # BLD AUTO: 5.17 K/UL (ref 3.9–12.7)

## 2019-11-06 PROCEDURE — 80053 COMPREHEN METABOLIC PANEL: CPT

## 2019-11-06 PROCEDURE — 84153 ASSAY OF PSA TOTAL: CPT

## 2019-11-06 PROCEDURE — 85025 COMPLETE CBC W/AUTO DIFF WBC: CPT

## 2019-11-06 PROCEDURE — 36415 COLL VENOUS BLD VENIPUNCTURE: CPT

## 2019-11-06 PROCEDURE — 80061 LIPID PANEL: CPT

## 2019-11-18 ENCOUNTER — OFFICE VISIT (OUTPATIENT)
Dept: INTERNAL MEDICINE | Facility: CLINIC | Age: 69
End: 2019-11-18
Payer: COMMERCIAL

## 2019-11-18 VITALS
DIASTOLIC BLOOD PRESSURE: 84 MMHG | OXYGEN SATURATION: 95 % | TEMPERATURE: 97 F | WEIGHT: 185.63 LBS | SYSTOLIC BLOOD PRESSURE: 118 MMHG | BODY MASS INDEX: 27.49 KG/M2 | HEART RATE: 78 BPM | HEIGHT: 69 IN

## 2019-11-18 DIAGNOSIS — K76.0 FATTY LIVER: ICD-10-CM

## 2019-11-18 DIAGNOSIS — Z00.00 ROUTINE HEALTH MAINTENANCE: Primary | ICD-10-CM

## 2019-11-18 DIAGNOSIS — N40.0 BENIGN PROSTATIC HYPERPLASIA, UNSPECIFIED WHETHER LOWER URINARY TRACT SYMPTOMS PRESENT: ICD-10-CM

## 2019-11-18 DIAGNOSIS — K21.9 GASTROESOPHAGEAL REFLUX DISEASE, ESOPHAGITIS PRESENCE NOT SPECIFIED: ICD-10-CM

## 2019-11-18 PROCEDURE — 99999 PR PBB SHADOW E&M-EST. PATIENT-LVL III: CPT | Mod: PBBFAC,,, | Performed by: FAMILY MEDICINE

## 2019-11-18 PROCEDURE — 99999 PR PBB SHADOW E&M-EST. PATIENT-LVL III: ICD-10-PCS | Mod: PBBFAC,,, | Performed by: FAMILY MEDICINE

## 2019-11-18 PROCEDURE — 99397 PER PM REEVAL EST PAT 65+ YR: CPT | Mod: S$GLB,,, | Performed by: FAMILY MEDICINE

## 2019-11-18 PROCEDURE — 99397 PR PREVENTIVE VISIT,EST,65 & OVER: ICD-10-PCS | Mod: S$GLB,,, | Performed by: FAMILY MEDICINE

## 2019-11-18 NOTE — PROGRESS NOTES
Subjective:       Patient ID: Olivier Cuenca is a 68 y.o. male.    Chief Complaint: Annual Exam    HPIannual check up and f/u prob list below;still occas headache;see prior note mri ok  D/wd cv risk 10% declines statin    Active Problem List with Overview Notes    Diagnosis Date Noted    GERD (gastroesophageal reflux disease) 01/15/2018    BPH (benign prostatic hyperplasia) 10/22/2013    Fatty liver     Screen for colon cancer 02/28/2018    DDD (degenerative disc disease), cervical 09/21/2016    Neck pain 09/19/2016    Poliomyelitis osteopathy of left lower leg 10/20/2015    Personal history of colonic polyps 03/13/2015    Erectile dysfunction        Review of Systems  Cardiovascular: no chest pain  Chest: no shortness of breath  Abd: no abd pain  Remainder review of systems negative    Objective:      Physical Exam   Constitutional: He is oriented to person, place, and time. He appears well-developed and well-nourished.   HENT:   Head: Normocephalic and atraumatic.   Right Ear: External ear normal.   Left Ear: External ear normal.   Nose: Nose normal.   Mouth/Throat: Oropharynx is clear and moist.   Nl tms   Eyes: Pupils are equal, round, and reactive to light. Conjunctivae and EOM are normal. No scleral icterus.   Neck: Normal range of motion. Neck supple. Carotid bruit is not present.   Cardiovascular: Normal rate, regular rhythm and normal heart sounds. Exam reveals no gallop and no friction rub.   No murmur heard.  Pulmonary/Chest: Effort normal and breath sounds normal. He has no wheezes.   Abdominal: Soft. Bowel sounds are normal. He exhibits no distension and no mass. There is no hepatosplenomegaly. There is no tenderness. There is no rebound and no guarding.   Musculoskeletal: Normal range of motion. He exhibits no tenderness.   Lymphadenopathy:     He has no cervical adenopathy.   Neurological: He is alert and oriented to person, place, and time. No cranial nerve deficit. Coordination normal.    Skin: Skin is warm and dry. No rash noted. No erythema.   Psychiatric: He has a normal mood and affect. His behavior is normal. Judgment and thought content normal.   Nursing note and vitals reviewed.      Assessment:       1. Routine health maintenance    2. Fatty liver    3. Benign prostatic hyperplasia, unspecified whether lower urinary tract symptoms present    4. Gastroesophageal reflux disease, esophagitis presence not specified        Plan:       Lab 12 months and follow up after  Shingrix new shingles vaccine  via a pharmacy  Routine health maintenance  -     Comprehensive metabolic panel; Future; Expected date: 11/17/2020  -     Lipid panel; Future; Expected date: 11/17/2020  -     PSA, Screening; Future; Expected date: 11/17/2020  -     CBC auto differential; Future; Expected date: 11/18/2020    Fatty liver    Benign prostatic hyperplasia, unspecified whether lower urinary tract symptoms present    Gastroesophageal reflux disease, esophagitis presence not specified    **

## 2020-06-26 NOTE — TELEPHONE ENCOUNTER
I returned a call to the pt and informed that his refill request for cialis was sent to  already and he will return to clinic on Monday and once reviewed and authorized he will be contacted by Cori or myself. He verbalized understanding. //kah

## 2020-06-26 NOTE — TELEPHONE ENCOUNTER
----- Message from Melissa Gutierrez sent at 6/26/2020 11:40 AM CDT -----  Regarding: Refill  Contact: Pt  Pt is calling the staff regarding a refill RX CIALIS 5 mg tablet  Once a day/ 30 day supply      [facility], [facility] - [facility] [address] [address]  [address] [address] [address]  Phone: [phone] Fax: [phone]    Pt call back [phone]    Thanks

## 2020-06-26 NOTE — TELEPHONE ENCOUNTER
----- Message from Melissa Gutierrez sent at 6/26/2020 11:40 AM CDT -----  Regarding: Refill  Contact: Pt  Pt is calling the staff regarding a refill RX CIALIS 5 mg tablet  Once a day/ 30 day supply      [facility], [facility] - [facility] [address] [address]  [address] [address] [address]  Phone: [phone] Fax: [phone]    Pt call back [phone]    Thanks

## 2020-07-01 RX ORDER — TADALAFIL 5 MG/1
TABLET ORAL
Qty: 30 TABLET | Refills: 11 | Status: SHIPPED | OUTPATIENT
Start: 2020-07-01 | End: 2021-10-01 | Stop reason: SDUPTHER

## 2021-01-08 ENCOUNTER — IMMUNIZATION (OUTPATIENT)
Dept: INTERNAL MEDICINE | Facility: CLINIC | Age: 71
End: 2021-01-08
Payer: COMMERCIAL

## 2021-01-08 DIAGNOSIS — Z23 NEED FOR VACCINATION: ICD-10-CM

## 2021-01-08 PROCEDURE — 91300 COVID-19, MRNA, LNP-S, PF, 30 MCG/0.3 ML DOSE VACCINE: CPT | Mod: PBBFAC | Performed by: FAMILY MEDICINE

## 2021-01-29 ENCOUNTER — IMMUNIZATION (OUTPATIENT)
Dept: INTERNAL MEDICINE | Facility: CLINIC | Age: 71
End: 2021-01-29
Payer: COMMERCIAL

## 2021-01-29 DIAGNOSIS — Z23 NEED FOR VACCINATION: Primary | ICD-10-CM

## 2021-01-29 PROCEDURE — 91300 COVID-19, MRNA, LNP-S, PF, 30 MCG/0.3 ML DOSE VACCINE: CPT | Mod: PBBFAC | Performed by: FAMILY MEDICINE

## 2021-01-29 PROCEDURE — 0002A COVID-19, MRNA, LNP-S, PF, 30 MCG/0.3 ML DOSE VACCINE: CPT | Mod: PBBFAC | Performed by: FAMILY MEDICINE

## 2021-03-25 ENCOUNTER — PATIENT MESSAGE (OUTPATIENT)
Dept: ADMINISTRATIVE | Facility: HOSPITAL | Age: 71
End: 2021-03-25

## 2021-05-06 ENCOUNTER — LAB VISIT (OUTPATIENT)
Dept: LAB | Facility: HOSPITAL | Age: 71
End: 2021-05-06
Attending: FAMILY MEDICINE
Payer: COMMERCIAL

## 2021-05-06 ENCOUNTER — OFFICE VISIT (OUTPATIENT)
Dept: INTERNAL MEDICINE | Facility: CLINIC | Age: 71
End: 2021-05-06
Payer: COMMERCIAL

## 2021-05-06 ENCOUNTER — PATIENT MESSAGE (OUTPATIENT)
Dept: ENDOSCOPY | Facility: HOSPITAL | Age: 71
End: 2021-05-06

## 2021-05-06 VITALS
SYSTOLIC BLOOD PRESSURE: 122 MMHG | OXYGEN SATURATION: 99 % | HEART RATE: 73 BPM | DIASTOLIC BLOOD PRESSURE: 84 MMHG | HEIGHT: 69 IN | WEIGHT: 198.19 LBS | TEMPERATURE: 97 F | BODY MASS INDEX: 29.36 KG/M2

## 2021-05-06 DIAGNOSIS — Z00.00 ROUTINE HEALTH MAINTENANCE: Primary | ICD-10-CM

## 2021-05-06 DIAGNOSIS — Z00.00 ROUTINE HEALTH MAINTENANCE: ICD-10-CM

## 2021-05-06 DIAGNOSIS — N40.0 BENIGN PROSTATIC HYPERPLASIA, UNSPECIFIED WHETHER LOWER URINARY TRACT SYMPTOMS PRESENT: ICD-10-CM

## 2021-05-06 DIAGNOSIS — K76.0 FATTY LIVER: ICD-10-CM

## 2021-05-06 DIAGNOSIS — Z12.11 SCREEN FOR COLON CANCER: ICD-10-CM

## 2021-05-06 DIAGNOSIS — L98.9 SKIN LESION: ICD-10-CM

## 2021-05-06 LAB
ALBUMIN SERPL BCP-MCNC: 4.1 G/DL (ref 3.5–5.2)
ALP SERPL-CCNC: 111 U/L (ref 55–135)
ALT SERPL W/O P-5'-P-CCNC: 32 U/L (ref 10–44)
ANION GAP SERPL CALC-SCNC: 7 MMOL/L (ref 8–16)
AST SERPL-CCNC: 24 U/L (ref 10–40)
BASOPHILS # BLD AUTO: 0.03 K/UL (ref 0–0.2)
BASOPHILS NFR BLD: 0.5 % (ref 0–1.9)
BILIRUB SERPL-MCNC: 0.7 MG/DL (ref 0.1–1)
BUN SERPL-MCNC: 12 MG/DL (ref 8–23)
CALCIUM SERPL-MCNC: 9.3 MG/DL (ref 8.7–10.5)
CHLORIDE SERPL-SCNC: 106 MMOL/L (ref 95–110)
CHOLEST SERPL-MCNC: 127 MG/DL (ref 120–199)
CHOLEST/HDLC SERPL: 3.8 {RATIO} (ref 2–5)
CO2 SERPL-SCNC: 29 MMOL/L (ref 23–29)
COMPLEXED PSA SERPL-MCNC: 2.2 NG/ML (ref 0–4)
CREAT SERPL-MCNC: 0.8 MG/DL (ref 0.5–1.4)
DIFFERENTIAL METHOD: NORMAL
EOSINOPHIL # BLD AUTO: 0.1 K/UL (ref 0–0.5)
EOSINOPHIL NFR BLD: 2 % (ref 0–8)
ERYTHROCYTE [DISTWIDTH] IN BLOOD BY AUTOMATED COUNT: 12.9 % (ref 11.5–14.5)
EST. GFR  (AFRICAN AMERICAN): >60 ML/MIN/1.73 M^2
EST. GFR  (NON AFRICAN AMERICAN): >60 ML/MIN/1.73 M^2
GLUCOSE SERPL-MCNC: 89 MG/DL (ref 70–110)
HCT VFR BLD AUTO: 47.8 % (ref 40–54)
HDLC SERPL-MCNC: 33 MG/DL (ref 40–75)
HDLC SERPL: 26 % (ref 20–50)
HGB BLD-MCNC: 16 G/DL (ref 14–18)
IMM GRANULOCYTES # BLD AUTO: 0.01 K/UL (ref 0–0.04)
IMM GRANULOCYTES NFR BLD AUTO: 0.2 % (ref 0–0.5)
LDLC SERPL CALC-MCNC: 69.2 MG/DL (ref 63–159)
LYMPHOCYTES # BLD AUTO: 1.3 K/UL (ref 1–4.8)
LYMPHOCYTES NFR BLD: 22.9 % (ref 18–48)
MCH RBC QN AUTO: 29.6 PG (ref 27–31)
MCHC RBC AUTO-ENTMCNC: 33.5 G/DL (ref 32–36)
MCV RBC AUTO: 89 FL (ref 82–98)
MONOCYTES # BLD AUTO: 0.6 K/UL (ref 0.3–1)
MONOCYTES NFR BLD: 10.5 % (ref 4–15)
NEUTROPHILS # BLD AUTO: 3.6 K/UL (ref 1.8–7.7)
NEUTROPHILS NFR BLD: 63.9 % (ref 38–73)
NONHDLC SERPL-MCNC: 94 MG/DL
NRBC BLD-RTO: 0 /100 WBC
PLATELET # BLD AUTO: 220 K/UL (ref 150–450)
PMV BLD AUTO: 10.9 FL (ref 9.2–12.9)
POTASSIUM SERPL-SCNC: 5 MMOL/L (ref 3.5–5.1)
PROT SERPL-MCNC: 7.3 G/DL (ref 6–8.4)
RBC # BLD AUTO: 5.4 M/UL (ref 4.6–6.2)
SODIUM SERPL-SCNC: 142 MMOL/L (ref 136–145)
TRIGL SERPL-MCNC: 124 MG/DL (ref 30–150)
WBC # BLD AUTO: 5.63 K/UL (ref 3.9–12.7)

## 2021-05-06 PROCEDURE — 36415 COLL VENOUS BLD VENIPUNCTURE: CPT | Performed by: FAMILY MEDICINE

## 2021-05-06 PROCEDURE — 85025 COMPLETE CBC W/AUTO DIFF WBC: CPT | Performed by: FAMILY MEDICINE

## 2021-05-06 PROCEDURE — 1126F AMNT PAIN NOTED NONE PRSNT: CPT | Mod: S$GLB,,, | Performed by: FAMILY MEDICINE

## 2021-05-06 PROCEDURE — 3008F BODY MASS INDEX DOCD: CPT | Mod: CPTII,S$GLB,, | Performed by: FAMILY MEDICINE

## 2021-05-06 PROCEDURE — 3288F FALL RISK ASSESSMENT DOCD: CPT | Mod: CPTII,S$GLB,, | Performed by: FAMILY MEDICINE

## 2021-05-06 PROCEDURE — 1126F PR PAIN SEVERITY QUANTIFIED, NO PAIN PRESENT: ICD-10-PCS | Mod: S$GLB,,, | Performed by: FAMILY MEDICINE

## 2021-05-06 PROCEDURE — 99999 PR PBB SHADOW E&M-EST. PATIENT-LVL IV: CPT | Mod: PBBFAC,,, | Performed by: FAMILY MEDICINE

## 2021-05-06 PROCEDURE — 1101F PR PT FALLS ASSESS DOC 0-1 FALLS W/OUT INJ PAST YR: ICD-10-PCS | Mod: CPTII,S$GLB,, | Performed by: FAMILY MEDICINE

## 2021-05-06 PROCEDURE — 1101F PT FALLS ASSESS-DOCD LE1/YR: CPT | Mod: CPTII,S$GLB,, | Performed by: FAMILY MEDICINE

## 2021-05-06 PROCEDURE — 99397 PR PREVENTIVE VISIT,EST,65 & OVER: ICD-10-PCS | Mod: S$GLB,,, | Performed by: FAMILY MEDICINE

## 2021-05-06 PROCEDURE — 99999 PR PBB SHADOW E&M-EST. PATIENT-LVL IV: ICD-10-PCS | Mod: PBBFAC,,, | Performed by: FAMILY MEDICINE

## 2021-05-06 PROCEDURE — 1157F ADVNC CARE PLAN IN RCRD: CPT | Mod: S$GLB,,, | Performed by: FAMILY MEDICINE

## 2021-05-06 PROCEDURE — 3288F PR FALLS RISK ASSESSMENT DOCUMENTED: ICD-10-PCS | Mod: CPTII,S$GLB,, | Performed by: FAMILY MEDICINE

## 2021-05-06 PROCEDURE — 3008F PR BODY MASS INDEX (BMI) DOCUMENTED: ICD-10-PCS | Mod: CPTII,S$GLB,, | Performed by: FAMILY MEDICINE

## 2021-05-06 PROCEDURE — 80061 LIPID PANEL: CPT | Performed by: FAMILY MEDICINE

## 2021-05-06 PROCEDURE — 1157F PR ADVANCE CARE PLAN OR EQUIV PRESENT IN MEDICAL RECORD: ICD-10-PCS | Mod: S$GLB,,, | Performed by: FAMILY MEDICINE

## 2021-05-06 PROCEDURE — 84153 ASSAY OF PSA TOTAL: CPT | Performed by: FAMILY MEDICINE

## 2021-05-06 PROCEDURE — 99397 PER PM REEVAL EST PAT 65+ YR: CPT | Mod: S$GLB,,, | Performed by: FAMILY MEDICINE

## 2021-05-06 PROCEDURE — 80053 COMPREHEN METABOLIC PANEL: CPT | Performed by: FAMILY MEDICINE

## 2021-05-07 ENCOUNTER — PATIENT MESSAGE (OUTPATIENT)
Dept: ENDOSCOPY | Facility: HOSPITAL | Age: 71
End: 2021-05-07

## 2021-05-07 ENCOUNTER — TELEPHONE (OUTPATIENT)
Dept: ENDOSCOPY | Facility: HOSPITAL | Age: 71
End: 2021-05-07

## 2021-05-07 DIAGNOSIS — Z12.11 COLON CANCER SCREENING: Primary | ICD-10-CM

## 2021-05-07 RX ORDER — SODIUM, POTASSIUM,MAG SULFATES 17.5-3.13G
1 SOLUTION, RECONSTITUTED, ORAL ORAL DAILY
Qty: 1 KIT | Refills: 0 | Status: SHIPPED | OUTPATIENT
Start: 2021-05-07 | End: 2021-05-09

## 2021-05-12 ENCOUNTER — OFFICE VISIT (OUTPATIENT)
Dept: DERMATOLOGY | Facility: CLINIC | Age: 71
End: 2021-05-12
Payer: COMMERCIAL

## 2021-05-12 ENCOUNTER — PATIENT OUTREACH (OUTPATIENT)
Dept: ADMINISTRATIVE | Facility: OTHER | Age: 71
End: 2021-05-12

## 2021-05-12 DIAGNOSIS — L82.1 SEBORRHEIC KERATOSES: Primary | ICD-10-CM

## 2021-05-12 DIAGNOSIS — D22.9 BENIGN NEVUS: ICD-10-CM

## 2021-05-12 DIAGNOSIS — L98.9 SKIN LESION: ICD-10-CM

## 2021-05-12 DIAGNOSIS — Z12.83 SCREENING, MALIGNANT NEOPLASM, SKIN: ICD-10-CM

## 2021-05-12 PROCEDURE — 99999 PR PBB SHADOW E&M-EST. PATIENT-LVL III: CPT | Mod: PBBFAC,,, | Performed by: STUDENT IN AN ORGANIZED HEALTH CARE EDUCATION/TRAINING PROGRAM

## 2021-05-12 PROCEDURE — 1126F PR PAIN SEVERITY QUANTIFIED, NO PAIN PRESENT: ICD-10-PCS | Mod: S$GLB,,, | Performed by: STUDENT IN AN ORGANIZED HEALTH CARE EDUCATION/TRAINING PROGRAM

## 2021-05-12 PROCEDURE — 3288F PR FALLS RISK ASSESSMENT DOCUMENTED: ICD-10-PCS | Mod: CPTII,S$GLB,, | Performed by: STUDENT IN AN ORGANIZED HEALTH CARE EDUCATION/TRAINING PROGRAM

## 2021-05-12 PROCEDURE — 99213 OFFICE O/P EST LOW 20 MIN: CPT | Mod: S$GLB,,, | Performed by: STUDENT IN AN ORGANIZED HEALTH CARE EDUCATION/TRAINING PROGRAM

## 2021-05-12 PROCEDURE — 1157F ADVNC CARE PLAN IN RCRD: CPT | Mod: S$GLB,,, | Performed by: STUDENT IN AN ORGANIZED HEALTH CARE EDUCATION/TRAINING PROGRAM

## 2021-05-12 PROCEDURE — 3288F FALL RISK ASSESSMENT DOCD: CPT | Mod: CPTII,S$GLB,, | Performed by: STUDENT IN AN ORGANIZED HEALTH CARE EDUCATION/TRAINING PROGRAM

## 2021-05-12 PROCEDURE — 1126F AMNT PAIN NOTED NONE PRSNT: CPT | Mod: S$GLB,,, | Performed by: STUDENT IN AN ORGANIZED HEALTH CARE EDUCATION/TRAINING PROGRAM

## 2021-05-12 PROCEDURE — 1159F PR MEDICATION LIST DOCUMENTED IN MEDICAL RECORD: ICD-10-PCS | Mod: S$GLB,,, | Performed by: STUDENT IN AN ORGANIZED HEALTH CARE EDUCATION/TRAINING PROGRAM

## 2021-05-12 PROCEDURE — 99999 PR PBB SHADOW E&M-EST. PATIENT-LVL III: ICD-10-PCS | Mod: PBBFAC,,, | Performed by: STUDENT IN AN ORGANIZED HEALTH CARE EDUCATION/TRAINING PROGRAM

## 2021-05-12 PROCEDURE — 1159F MED LIST DOCD IN RCRD: CPT | Mod: S$GLB,,, | Performed by: STUDENT IN AN ORGANIZED HEALTH CARE EDUCATION/TRAINING PROGRAM

## 2021-05-12 PROCEDURE — 99213 PR OFFICE/OUTPT VISIT, EST, LEVL III, 20-29 MIN: ICD-10-PCS | Mod: S$GLB,,, | Performed by: STUDENT IN AN ORGANIZED HEALTH CARE EDUCATION/TRAINING PROGRAM

## 2021-05-12 PROCEDURE — 1101F PT FALLS ASSESS-DOCD LE1/YR: CPT | Mod: CPTII,S$GLB,, | Performed by: STUDENT IN AN ORGANIZED HEALTH CARE EDUCATION/TRAINING PROGRAM

## 2021-05-12 PROCEDURE — 1101F PR PT FALLS ASSESS DOC 0-1 FALLS W/OUT INJ PAST YR: ICD-10-PCS | Mod: CPTII,S$GLB,, | Performed by: STUDENT IN AN ORGANIZED HEALTH CARE EDUCATION/TRAINING PROGRAM

## 2021-05-12 PROCEDURE — 1157F PR ADVANCE CARE PLAN OR EQUIV PRESENT IN MEDICAL RECORD: ICD-10-PCS | Mod: S$GLB,,, | Performed by: STUDENT IN AN ORGANIZED HEALTH CARE EDUCATION/TRAINING PROGRAM

## 2021-05-31 ENCOUNTER — PATIENT MESSAGE (OUTPATIENT)
Dept: INTERNAL MEDICINE | Facility: CLINIC | Age: 71
End: 2021-05-31

## 2021-06-08 ENCOUNTER — ANESTHESIA EVENT (OUTPATIENT)
Dept: ENDOSCOPY | Facility: HOSPITAL | Age: 71
End: 2021-06-08
Payer: COMMERCIAL

## 2021-06-09 RX ORDER — SODIUM CHLORIDE, SODIUM LACTATE, POTASSIUM CHLORIDE, CALCIUM CHLORIDE 600; 310; 30; 20 MG/100ML; MG/100ML; MG/100ML; MG/100ML
INJECTION, SOLUTION INTRAVENOUS CONTINUOUS
Status: CANCELLED | OUTPATIENT
Start: 2021-06-09

## 2021-06-10 ENCOUNTER — ANESTHESIA (OUTPATIENT)
Dept: ENDOSCOPY | Facility: HOSPITAL | Age: 71
End: 2021-06-10
Payer: COMMERCIAL

## 2021-06-10 ENCOUNTER — HOSPITAL ENCOUNTER (OUTPATIENT)
Facility: HOSPITAL | Age: 71
Discharge: HOME | End: 2021-06-10
Attending: SURGERY | Admitting: SURGERY
Payer: COMMERCIAL

## 2021-06-10 VITALS
TEMPERATURE: 98 F | HEART RATE: 67 BPM | SYSTOLIC BLOOD PRESSURE: 129 MMHG | RESPIRATION RATE: 16 BRPM | WEIGHT: 193.44 LBS | HEIGHT: 69 IN | BODY MASS INDEX: 28.65 KG/M2 | OXYGEN SATURATION: 98 % | DIASTOLIC BLOOD PRESSURE: 70 MMHG

## 2021-06-10 DIAGNOSIS — Z12.11 COLON CANCER SCREENING: ICD-10-CM

## 2021-06-10 PROCEDURE — G0105 COLORECTAL SCRN; HI RISK IND: HCPCS | Performed by: SURGERY

## 2021-06-10 PROCEDURE — G0105 COLORECTAL SCRN; HI RISK IND: ICD-10-PCS | Mod: ,,, | Performed by: SURGERY

## 2021-06-10 PROCEDURE — G0105 COLORECTAL SCRN; HI RISK IND: HCPCS | Mod: ,,, | Performed by: SURGERY

## 2021-10-05 RX ORDER — TADALAFIL 5 MG/1
TABLET ORAL
Qty: 30 TABLET | Refills: 11 | Status: SHIPPED | OUTPATIENT
Start: 2021-10-05 | End: 2022-11-21 | Stop reason: SDUPTHER

## 2022-04-27 ENCOUNTER — PATIENT MESSAGE (OUTPATIENT)
Dept: ADMINISTRATIVE | Facility: HOSPITAL | Age: 72
End: 2022-04-27
Payer: MEDICARE

## 2022-06-06 ENCOUNTER — TELEPHONE (OUTPATIENT)
Dept: INTERNAL MEDICINE | Facility: CLINIC | Age: 72
End: 2022-06-06
Payer: MEDICARE

## 2022-06-06 NOTE — TELEPHONE ENCOUNTER
----- Message from Marianne Holt sent at 6/6/2022  3:05 PM CDT -----  Regarding: rdlyndsey  Contact: pt  The pt request a call to schedule a lab appt, I can't schedule passed 07/05/2022, no additional info given and can be reached at [phone]///[email or web address]

## 2022-06-06 NOTE — TELEPHONE ENCOUNTER
Unable to schedule pt labs on 7/20/2022 as requested due to CMP expiring on 7/5/2022. Please reorder CMP so lab can be scheduled. Please advise.//ddw

## 2022-07-14 ENCOUNTER — TELEPHONE (OUTPATIENT)
Dept: INTERNAL MEDICINE | Facility: CLINIC | Age: 72
End: 2022-07-14
Payer: MEDICARE

## 2022-07-14 DIAGNOSIS — Z00.00 ROUTINE HEALTH MAINTENANCE: Primary | ICD-10-CM

## 2022-07-14 DIAGNOSIS — Z12.5 SCREENING FOR PROSTATE CANCER: ICD-10-CM

## 2022-07-14 NOTE — TELEPHONE ENCOUNTER
----- Message from Wilda Garcia sent at 7/14/2022 11:21 AM CDT -----  Regarding: labs  Contact: patient  Patient is requesting lab orders, please call him back at [phone]

## 2022-07-15 ENCOUNTER — LAB VISIT (OUTPATIENT)
Dept: LAB | Facility: HOSPITAL | Age: 72
End: 2022-07-15
Attending: FAMILY MEDICINE
Payer: MEDICARE

## 2022-07-15 DIAGNOSIS — Z00.00 ROUTINE HEALTH MAINTENANCE: ICD-10-CM

## 2022-07-15 DIAGNOSIS — Z12.5 SCREENING FOR PROSTATE CANCER: ICD-10-CM

## 2022-07-15 LAB
ALBUMIN SERPL BCP-MCNC: 3.9 G/DL (ref 3.5–5.2)
ALP SERPL-CCNC: 103 U/L (ref 55–135)
ALT SERPL W/O P-5'-P-CCNC: 26 U/L (ref 10–44)
ANION GAP SERPL CALC-SCNC: 11 MMOL/L (ref 8–16)
AST SERPL-CCNC: 21 U/L (ref 10–40)
BASOPHILS # BLD AUTO: 0.02 K/UL (ref 0–0.2)
BASOPHILS NFR BLD: 0.4 % (ref 0–1.9)
BILIRUB SERPL-MCNC: 0.7 MG/DL (ref 0.1–1)
BUN SERPL-MCNC: 15 MG/DL (ref 8–23)
CALCIUM SERPL-MCNC: 9 MG/DL (ref 8.7–10.5)
CHLORIDE SERPL-SCNC: 107 MMOL/L (ref 95–110)
CHOLEST SERPL-MCNC: 125 MG/DL (ref 120–199)
CHOLEST/HDLC SERPL: 3.5 {RATIO} (ref 2–5)
CO2 SERPL-SCNC: 23 MMOL/L (ref 23–29)
COMPLEXED PSA SERPL-MCNC: 3.2 NG/ML (ref 0–4)
CREAT SERPL-MCNC: 0.7 MG/DL (ref 0.5–1.4)
DIFFERENTIAL METHOD: NORMAL
EOSINOPHIL # BLD AUTO: 0.1 K/UL (ref 0–0.5)
EOSINOPHIL NFR BLD: 2 % (ref 0–8)
ERYTHROCYTE [DISTWIDTH] IN BLOOD BY AUTOMATED COUNT: 12.9 % (ref 11.5–14.5)
EST. GFR  (AFRICAN AMERICAN): >60 ML/MIN/1.73 M^2
EST. GFR  (NON AFRICAN AMERICAN): >60 ML/MIN/1.73 M^2
GLUCOSE SERPL-MCNC: 89 MG/DL (ref 70–110)
HCT VFR BLD AUTO: 47.9 % (ref 40–54)
HDLC SERPL-MCNC: 36 MG/DL (ref 40–75)
HDLC SERPL: 28.8 % (ref 20–50)
HGB BLD-MCNC: 16.2 G/DL (ref 14–18)
IMM GRANULOCYTES # BLD AUTO: 0.02 K/UL (ref 0–0.04)
IMM GRANULOCYTES NFR BLD AUTO: 0.4 % (ref 0–0.5)
LDLC SERPL CALC-MCNC: 64.4 MG/DL (ref 63–159)
LYMPHOCYTES # BLD AUTO: 1.2 K/UL (ref 1–4.8)
LYMPHOCYTES NFR BLD: 22.6 % (ref 18–48)
MCH RBC QN AUTO: 30.7 PG (ref 27–31)
MCHC RBC AUTO-ENTMCNC: 33.8 G/DL (ref 32–36)
MCV RBC AUTO: 91 FL (ref 82–98)
MONOCYTES # BLD AUTO: 0.5 K/UL (ref 0.3–1)
MONOCYTES NFR BLD: 8.9 % (ref 4–15)
NEUTROPHILS # BLD AUTO: 3.6 K/UL (ref 1.8–7.7)
NEUTROPHILS NFR BLD: 65.7 % (ref 38–73)
NONHDLC SERPL-MCNC: 89 MG/DL
NRBC BLD-RTO: 0 /100 WBC
PLATELET # BLD AUTO: 204 K/UL (ref 150–450)
PMV BLD AUTO: 11.2 FL (ref 9.2–12.9)
POTASSIUM SERPL-SCNC: 4.4 MMOL/L (ref 3.5–5.1)
PROT SERPL-MCNC: 6.7 G/DL (ref 6–8.4)
RBC # BLD AUTO: 5.28 M/UL (ref 4.6–6.2)
SODIUM SERPL-SCNC: 141 MMOL/L (ref 136–145)
TRIGL SERPL-MCNC: 123 MG/DL (ref 30–150)
WBC # BLD AUTO: 5.48 K/UL (ref 3.9–12.7)

## 2022-07-15 PROCEDURE — 80053 COMPREHEN METABOLIC PANEL: CPT | Performed by: FAMILY MEDICINE

## 2022-07-15 PROCEDURE — 36415 COLL VENOUS BLD VENIPUNCTURE: CPT | Mod: PO | Performed by: FAMILY MEDICINE

## 2022-07-15 PROCEDURE — 85025 COMPLETE CBC W/AUTO DIFF WBC: CPT | Performed by: FAMILY MEDICINE

## 2022-07-15 PROCEDURE — 84153 ASSAY OF PSA TOTAL: CPT | Performed by: FAMILY MEDICINE

## 2022-07-15 PROCEDURE — 80061 LIPID PANEL: CPT | Performed by: FAMILY MEDICINE

## 2022-07-27 ENCOUNTER — OFFICE VISIT (OUTPATIENT)
Dept: INTERNAL MEDICINE | Facility: CLINIC | Age: 72
End: 2022-07-27
Payer: MEDICARE

## 2022-07-27 ENCOUNTER — HOSPITAL ENCOUNTER (OUTPATIENT)
Dept: RADIOLOGY | Facility: HOSPITAL | Age: 72
Discharge: HOME | End: 2022-07-27
Attending: PEDIATRICS
Payer: MEDICARE

## 2022-07-27 ENCOUNTER — HOSPITAL ENCOUNTER (OUTPATIENT)
Dept: CARDIOLOGY | Facility: HOSPITAL | Age: 72
Discharge: HOME | End: 2022-07-27
Attending: PEDIATRICS
Payer: MEDICARE

## 2022-07-27 VITALS
WEIGHT: 197.06 LBS | TEMPERATURE: 98 F | HEART RATE: 74 BPM | BODY MASS INDEX: 29.11 KG/M2 | SYSTOLIC BLOOD PRESSURE: 130 MMHG | DIASTOLIC BLOOD PRESSURE: 90 MMHG | OXYGEN SATURATION: 98 %

## 2022-07-27 DIAGNOSIS — R07.9 CHEST PAIN, UNSPECIFIED TYPE: ICD-10-CM

## 2022-07-27 DIAGNOSIS — N52.9 ERECTILE DYSFUNCTION, UNSPECIFIED ERECTILE DYSFUNCTION TYPE: ICD-10-CM

## 2022-07-27 DIAGNOSIS — Z12.83 SKIN CANCER SCREENING: ICD-10-CM

## 2022-07-27 DIAGNOSIS — K21.9 GASTROESOPHAGEAL REFLUX DISEASE, UNSPECIFIED WHETHER ESOPHAGITIS PRESENT: ICD-10-CM

## 2022-07-27 DIAGNOSIS — B00.9 HERPES SIMPLEX: ICD-10-CM

## 2022-07-27 DIAGNOSIS — Z86.010 PERSONAL HISTORY OF COLONIC POLYPS: ICD-10-CM

## 2022-07-27 DIAGNOSIS — K76.0 FATTY LIVER: ICD-10-CM

## 2022-07-27 DIAGNOSIS — Z00.00 WELL ADULT EXAM: Primary | ICD-10-CM

## 2022-07-27 DIAGNOSIS — M50.30 DDD (DEGENERATIVE DISC DISEASE), CERVICAL: ICD-10-CM

## 2022-07-27 DIAGNOSIS — N40.0 BENIGN PROSTATIC HYPERPLASIA, UNSPECIFIED WHETHER LOWER URINARY TRACT SYMPTOMS PRESENT: ICD-10-CM

## 2022-07-27 DIAGNOSIS — B91 POLIOMYELITIS OSTEOPATHY OF LEFT LOWER LEG: ICD-10-CM

## 2022-07-27 DIAGNOSIS — M89.662 POLIOMYELITIS OSTEOPATHY OF LEFT LOWER LEG: ICD-10-CM

## 2022-07-27 PROCEDURE — 3080F DIAST BP >= 90 MM HG: CPT | Mod: CPTII,S$GLB,, | Performed by: PEDIATRICS

## 2022-07-27 PROCEDURE — 3288F PR FALLS RISK ASSESSMENT DOCUMENTED: ICD-10-PCS | Mod: CPTII,S$GLB,, | Performed by: PEDIATRICS

## 2022-07-27 PROCEDURE — 99999 PR PBB SHADOW E&M-EST. PATIENT-LVL IV: ICD-10-PCS | Mod: PBBFAC,,, | Performed by: PEDIATRICS

## 2022-07-27 PROCEDURE — 1159F MED LIST DOCD IN RCRD: CPT | Mod: CPTII,S$GLB,, | Performed by: PEDIATRICS

## 2022-07-27 PROCEDURE — 1101F PR PT FALLS ASSESS DOC 0-1 FALLS W/OUT INJ PAST YR: ICD-10-PCS | Mod: CPTII,S$GLB,, | Performed by: PEDIATRICS

## 2022-07-27 PROCEDURE — 93010 ELECTROCARDIOGRAM REPORT: CPT | Mod: ,,, | Performed by: INTERNAL MEDICINE

## 2022-07-27 PROCEDURE — 3075F PR MOST RECENT SYSTOLIC BLOOD PRESS GE 130-139MM HG: ICD-10-PCS | Mod: CPTII,S$GLB,, | Performed by: PEDIATRICS

## 2022-07-27 PROCEDURE — 1101F PT FALLS ASSESS-DOCD LE1/YR: CPT | Mod: CPTII,S$GLB,, | Performed by: PEDIATRICS

## 2022-07-27 PROCEDURE — 1160F PR REVIEW ALL MEDS BY PRESCRIBER/CLIN PHARMACIST DOCUMENTED: ICD-10-PCS | Mod: CPTII,S$GLB,, | Performed by: PEDIATRICS

## 2022-07-27 PROCEDURE — 71046 XR CHEST PA AND LATERAL: ICD-10-PCS | Mod: 26,,, | Performed by: RADIOLOGY

## 2022-07-27 PROCEDURE — 93010 EKG 12-LEAD: ICD-10-PCS | Mod: ,,, | Performed by: INTERNAL MEDICINE

## 2022-07-27 PROCEDURE — 3008F BODY MASS INDEX DOCD: CPT | Mod: CPTII,S$GLB,, | Performed by: PEDIATRICS

## 2022-07-27 PROCEDURE — 3075F SYST BP GE 130 - 139MM HG: CPT | Mod: CPTII,S$GLB,, | Performed by: PEDIATRICS

## 2022-07-27 PROCEDURE — 93005 ELECTROCARDIOGRAM TRACING: CPT

## 2022-07-27 PROCEDURE — 1159F PR MEDICATION LIST DOCUMENTED IN MEDICAL RECORD: ICD-10-PCS | Mod: CPTII,S$GLB,, | Performed by: PEDIATRICS

## 2022-07-27 PROCEDURE — 99397 PER PM REEVAL EST PAT 65+ YR: CPT | Mod: GZ,S$GLB,, | Performed by: PEDIATRICS

## 2022-07-27 PROCEDURE — 1157F ADVNC CARE PLAN IN RCRD: CPT | Mod: CPTII,S$GLB,, | Performed by: PEDIATRICS

## 2022-07-27 PROCEDURE — 1160F RVW MEDS BY RX/DR IN RCRD: CPT | Mod: CPTII,S$GLB,, | Performed by: PEDIATRICS

## 2022-07-27 PROCEDURE — 71046 X-RAY EXAM CHEST 2 VIEWS: CPT | Mod: TC

## 2022-07-27 PROCEDURE — 1126F AMNT PAIN NOTED NONE PRSNT: CPT | Mod: CPTII,S$GLB,, | Performed by: PEDIATRICS

## 2022-07-27 PROCEDURE — 99999 PR PBB SHADOW E&M-EST. PATIENT-LVL IV: CPT | Mod: PBBFAC,,, | Performed by: PEDIATRICS

## 2022-07-27 PROCEDURE — 71046 X-RAY EXAM CHEST 2 VIEWS: CPT | Mod: 26,,, | Performed by: RADIOLOGY

## 2022-07-27 PROCEDURE — 1126F PR PAIN SEVERITY QUANTIFIED, NO PAIN PRESENT: ICD-10-PCS | Mod: CPTII,S$GLB,, | Performed by: PEDIATRICS

## 2022-07-27 PROCEDURE — 3080F PR MOST RECENT DIASTOLIC BLOOD PRESSURE >= 90 MM HG: ICD-10-PCS | Mod: CPTII,S$GLB,, | Performed by: PEDIATRICS

## 2022-07-27 PROCEDURE — 1157F PR ADVANCE CARE PLAN OR EQUIV PRESENT IN MEDICAL RECORD: ICD-10-PCS | Mod: CPTII,S$GLB,, | Performed by: PEDIATRICS

## 2022-07-27 PROCEDURE — 3288F FALL RISK ASSESSMENT DOCD: CPT | Mod: CPTII,S$GLB,, | Performed by: PEDIATRICS

## 2022-07-27 PROCEDURE — 3008F PR BODY MASS INDEX (BMI) DOCUMENTED: ICD-10-PCS | Mod: CPTII,S$GLB,, | Performed by: PEDIATRICS

## 2022-07-27 PROCEDURE — 99397 PR PREVENTIVE VISIT,EST,65 & OVER: ICD-10-PCS | Mod: GZ,S$GLB,, | Performed by: PEDIATRICS

## 2022-07-27 RX ORDER — VALACYCLOVIR HYDROCHLORIDE 1 G/1
TABLET, FILM COATED ORAL
Qty: 20 TABLET | Refills: 1 | Status: SHIPPED | OUTPATIENT
Start: 2022-07-27 | End: 2023-06-12

## 2022-07-27 NOTE — PROGRESS NOTES
Subjective:       Patient ID: Olivier Cuenca is a 71 y.o. male.    Chief Complaint: Annual Exam    Olivier Cuenca is a 71 y.o. male who presents to clinic for his annual. Was unable to see PCP.     Complains of chest pain, has not seen cards in a long time, does not happen with physical exercise. The pain is sharp brief, both substernal and neck and L arm in nature. Usually at rest, does occasionally have GERD Sx which Pepcid complete clears. Is still working on his camp, never occurs then.        Fatty liver     Erectile dysfunction     BPH     Hx of colonic polyps     Poliomyelitis osteopathy of left lower leg: followed by outside specialist     DDD     GERD: occasional pain, pepcid complete PRN    LABS REVIEWED AND DISCUSSED WITH PATIENT: PSA, CBC, CMP, lipid panel       Review of Systems   Constitutional: Negative for activity change, appetite change, chills, diaphoresis, fatigue, fever and unexpected weight change.   HENT: Negative for nasal congestion, ear pain, mouth sores, nosebleeds, postnasal drip, rhinorrhea, sneezing and sore throat.    Eyes: Negative for photophobia, pain, discharge, redness and visual disturbance.   Respiratory: Negative for cough, chest tightness, shortness of breath, wheezing and stridor.    Cardiovascular: Negative for chest pain, palpitations and leg swelling.   Gastrointestinal: Negative for abdominal distention, blood in stool, constipation, diarrhea, nausea and vomiting.   Genitourinary: Negative for decreased urine volume, difficulty urinating, dysuria, flank pain, frequency, genital sores, hematuria and urgency.   Musculoskeletal: Negative for arthralgias, back pain, joint swelling, neck pain and neck stiffness.   Integumentary:  Negative for color change, pallor, rash and wound.   Neurological: Negative for dizziness, syncope, speech difficulty, weakness, light-headedness and headaches.   Hematological: Negative for adenopathy. Does not bruise/bleed easily.    Psychiatric/Behavioral: Negative for confusion, decreased concentration, dysphoric mood, hallucinations, sleep disturbance and suicidal ideas. The patient is not nervous/anxious.    All other systems reviewed and are negative.        Objective:      Physical Exam  Vitals and nursing note reviewed.   Constitutional:       General: He is not in acute distress.     Appearance: He is well-developed.   Neck:      Thyroid: No thyromegaly.      Vascular: No JVD.   Cardiovascular:      Rate and Rhythm: Normal rate and regular rhythm.      Heart sounds: Normal heart sounds. No murmur heard.  Pulmonary:      Effort: Pulmonary effort is normal. No respiratory distress.      Breath sounds: Normal breath sounds. No wheezing or rales.   Abdominal:      General: There is no distension.      Palpations: Abdomen is soft. There is no mass.      Tenderness: There is no abdominal tenderness. There is no guarding.   Musculoskeletal:      Right lower leg: No edema.      Left lower leg: No edema.   Lymphadenopathy:      Cervical: No cervical adenopathy.   Skin:     Capillary Refill: Capillary refill takes less than 2 seconds.      Findings: No rash.   Neurological:      General: No focal deficit present.      Mental Status: He is alert and oriented to person, place, and time.      Cranial Nerves: No cranial nerve deficit.      Coordination: Coordination normal.   Psychiatric:         Mood and Affect: Mood normal.         Behavior: Behavior normal.         Thought Content: Thought content normal.         Judgment: Judgment normal.         Assessment:       Problem List Items Addressed This Visit     Fatty liver    Erectile dysfunction    BPH (benign prostatic hyperplasia)    Personal history of colonic polyps    Poliomyelitis osteopathy of left lower leg    DDD (degenerative disc disease), cervical    GERD (gastroesophageal reflux disease)    Herpes simplex    Relevant Medications    valACYclovir (VALTREX) 1000 MG tablet      Other Visit  Diagnoses     Well adult exam    -  Primary    Chest pain, unspecified type        Relevant Orders    X-Ray Chest PA And Lateral (Completed)    EKG 12-lead    NM Myocardial Perfusion Spect Multi Pharmacologic    Skin cancer screening        Relevant Orders    Ambulatory referral/consult to Dermatology          Plan:     Well adult exam    Gastroesophageal reflux disease, unspecified whether esophagitis present    Fatty liver    Poliomyelitis osteopathy of left lower leg    Personal history of colonic polyps    Benign prostatic hyperplasia, unspecified whether lower urinary tract symptoms present    DDD (degenerative disc disease), cervical    Erectile dysfunction, unspecified erectile dysfunction type    Chest pain, unspecified type  -     X-Ray Chest PA And Lateral; Future; Expected date: 07/27/2022  -     EKG 12-lead; Future  -     NM Myocardial Perfusion Spect Multi Pharmacologic; Future; Expected date: 07/27/2022    Herpes simplex  -     valACYclovir (VALTREX) 1000 MG tablet; 2 at onset cold sore, repeat 2 in 12 hours  Dispense: 20 tablet; Refill: 1    Skin cancer screening  -     Ambulatory referral/consult to Dermatology; Future; Expected date: 08/03/2022      HM discussed vaccines reviewed, use pepcid complete BID for 4 weeks to see if it affects his CP but set w/u in place to rule out CAD, he will need a pharm nuke stress test due to his orthopedic condition, if his Sx worsen or change he was instructed to activate EMS, follow up with Dr. Giang in 4 weeks    Scribe Attestation:   I, Baudilio Mcgowan, am scribing for, and in the presence of, Dr. Luis Dobbs Jr. I performed the above scribed service and the documentation accurately describes the services I performed. I attest to the accuracy of the note.    I, Dr. Luis Dobbs Jr, reviewed documentation as scribed above. I personally performed the services described in this documentation.  I agree that the record reflects my personal performance  and is accurate and complete. Luis Dobbs Jr., MD.  07/27/2022

## 2022-08-08 ENCOUNTER — OFFICE VISIT (OUTPATIENT)
Dept: CARDIOLOGY | Facility: CLINIC | Age: 72
End: 2022-08-08
Payer: MEDICARE

## 2022-08-08 VITALS
DIASTOLIC BLOOD PRESSURE: 84 MMHG | OXYGEN SATURATION: 96 % | HEART RATE: 98 BPM | SYSTOLIC BLOOD PRESSURE: 130 MMHG | BODY MASS INDEX: 29.19 KG/M2 | HEIGHT: 69 IN | WEIGHT: 197.06 LBS

## 2022-08-08 DIAGNOSIS — B91 POLIOMYELITIS OSTEOPATHY OF LEFT LOWER LEG: ICD-10-CM

## 2022-08-08 DIAGNOSIS — M89.662 POLIOMYELITIS OSTEOPATHY OF LEFT LOWER LEG: ICD-10-CM

## 2022-08-08 DIAGNOSIS — R03.0 ELEVATED BLOOD PRESSURE READING WITHOUT DIAGNOSIS OF HYPERTENSION: ICD-10-CM

## 2022-08-08 DIAGNOSIS — K21.9 GASTROESOPHAGEAL REFLUX DISEASE, UNSPECIFIED WHETHER ESOPHAGITIS PRESENT: ICD-10-CM

## 2022-08-08 DIAGNOSIS — R07.9 CHEST PAIN, UNSPECIFIED TYPE: Primary | ICD-10-CM

## 2022-08-08 PROCEDURE — 1159F PR MEDICATION LIST DOCUMENTED IN MEDICAL RECORD: ICD-10-PCS | Mod: CPTII,S$GLB,, | Performed by: INTERNAL MEDICINE

## 2022-08-08 PROCEDURE — 3288F FALL RISK ASSESSMENT DOCD: CPT | Mod: CPTII,S$GLB,, | Performed by: INTERNAL MEDICINE

## 2022-08-08 PROCEDURE — 3079F DIAST BP 80-89 MM HG: CPT | Mod: CPTII,S$GLB,, | Performed by: INTERNAL MEDICINE

## 2022-08-08 PROCEDURE — 3079F PR MOST RECENT DIASTOLIC BLOOD PRESSURE 80-89 MM HG: ICD-10-PCS | Mod: CPTII,S$GLB,, | Performed by: INTERNAL MEDICINE

## 2022-08-08 PROCEDURE — 1159F MED LIST DOCD IN RCRD: CPT | Mod: CPTII,S$GLB,, | Performed by: INTERNAL MEDICINE

## 2022-08-08 PROCEDURE — 3288F PR FALLS RISK ASSESSMENT DOCUMENTED: ICD-10-PCS | Mod: CPTII,S$GLB,, | Performed by: INTERNAL MEDICINE

## 2022-08-08 PROCEDURE — 99999 PR PBB SHADOW E&M-EST. PATIENT-LVL III: CPT | Mod: PBBFAC,,, | Performed by: INTERNAL MEDICINE

## 2022-08-08 PROCEDURE — 3008F BODY MASS INDEX DOCD: CPT | Mod: CPTII,S$GLB,, | Performed by: INTERNAL MEDICINE

## 2022-08-08 PROCEDURE — 3075F SYST BP GE 130 - 139MM HG: CPT | Mod: CPTII,S$GLB,, | Performed by: INTERNAL MEDICINE

## 2022-08-08 PROCEDURE — 1157F PR ADVANCE CARE PLAN OR EQUIV PRESENT IN MEDICAL RECORD: ICD-10-PCS | Mod: CPTII,S$GLB,, | Performed by: INTERNAL MEDICINE

## 2022-08-08 PROCEDURE — 99999 PR PBB SHADOW E&M-EST. PATIENT-LVL III: ICD-10-PCS | Mod: PBBFAC,,, | Performed by: INTERNAL MEDICINE

## 2022-08-08 PROCEDURE — 99204 OFFICE O/P NEW MOD 45 MIN: CPT | Mod: S$GLB,,, | Performed by: INTERNAL MEDICINE

## 2022-08-08 PROCEDURE — 1157F ADVNC CARE PLAN IN RCRD: CPT | Mod: CPTII,S$GLB,, | Performed by: INTERNAL MEDICINE

## 2022-08-08 PROCEDURE — 3075F PR MOST RECENT SYSTOLIC BLOOD PRESS GE 130-139MM HG: ICD-10-PCS | Mod: CPTII,S$GLB,, | Performed by: INTERNAL MEDICINE

## 2022-08-08 PROCEDURE — 1101F PT FALLS ASSESS-DOCD LE1/YR: CPT | Mod: CPTII,S$GLB,, | Performed by: INTERNAL MEDICINE

## 2022-08-08 PROCEDURE — 99204 PR OFFICE/OUTPT VISIT, NEW, LEVL IV, 45-59 MIN: ICD-10-PCS | Mod: S$GLB,,, | Performed by: INTERNAL MEDICINE

## 2022-08-08 PROCEDURE — 1101F PR PT FALLS ASSESS DOC 0-1 FALLS W/OUT INJ PAST YR: ICD-10-PCS | Mod: CPTII,S$GLB,, | Performed by: INTERNAL MEDICINE

## 2022-08-08 PROCEDURE — 3008F PR BODY MASS INDEX (BMI) DOCUMENTED: ICD-10-PCS | Mod: CPTII,S$GLB,, | Performed by: INTERNAL MEDICINE

## 2022-08-08 NOTE — PROGRESS NOTES
Subjective:   Patient ID:  Olivier Cuenca is a 71 y.o. male who presents for evaluation of No chief complaint on file.      HPI  70 yo male , 20 PQY, quit 37 years ago ,   Comes in for evaluation of chest pain, started about 3 weeks ago, was having sharp, left sided chest pain, parasternal mild right and left sided pains, seconds lasting , not with exertion   States when he is wore up and busy , no chest pain   He also uses crutches at home.  Not tender on exam.  No dyspnea on exertion.      Past Medical History:   Diagnosis Date    BPH (benign prostatic hyperplasia)     Colon polyp     Erectile dysfunction     Ex-smoker     quit     Fatty liver     Herpes simplex     History of poliomyelitis     Migraine     Thalassemia minor        Past Surgical History:   Procedure Laterality Date    APPENDECTOMY      CHOLECYSTECTOMY      COLONOSCOPY N/A 2018    Procedure: COLONOSCOPY;  Surgeon: Bo Moreno MD;  Location: [facility];  Service: Endoscopy;  Laterality: N/A;  Pt requesting no anesthesia - no sedation.  Will not have .    COLONOSCOPY N/A 6/10/2021    Procedure: COLONOSCOPY;  Surgeon: Juancho Spicer MD;  Location: [facility];  Service: Endoscopy;  Laterality: N/A;    FEMUR FRACTURE SURGERY  2020    after fall from deer stand    HERNIA REPAIR         Social History     Tobacco Use    Smoking status: Former Smoker     Start date: 3/13/1964     Quit date: 3/13/1985     Years since quittin.4    Smokeless tobacco: Never Used   Substance Use Topics    Alcohol use: No    Drug use: Yes     Types: Marijuana       Family History   Problem Relation Age of Onset    Dementia Mother     Hyperlipidemia Father     Melanoma Neg Hx        Review of Systems   Constitutional: Negative for fever and malaise/fatigue.   HENT: Negative for sore throat.    Eyes: Negative for blurred vision.   Cardiovascular: Positive for chest pain. Negative for claudication, cyanosis, dyspnea on  exertion, irregular heartbeat, leg swelling, near-syncope, orthopnea, palpitations, paroxysmal nocturnal dyspnea and syncope.   Respiratory: Negative for cough and hemoptysis.    Hematologic/Lymphatic: Negative for bleeding problem.   Skin: Negative for rash.   Musculoskeletal: Negative for falls.   Gastrointestinal: Negative for abdominal pain.   Genitourinary: Negative.    Neurological: Negative.    Psychiatric/Behavioral: Negative for altered mental status and substance abuse.       Current Outpatient Medications on File Prior to Visit   Medication Sig    tadalafiL (CIALIS) 5 MG tablet Take 1 tablet (5 mg total) by mouth once daily. **May Cause Dizziness** *DO NOT TAKE WITH NITRATE *    valACYclovir (VALTREX) 1000 MG tablet 2 at onset cold sore, repeat 2 in 12 hours     No current facility-administered medications on file prior to visit.       Objective:   Objective:  Wt Readings from Last 3 Encounters:   08/08/22 89.4 kg (197 lb 1.5 oz)   07/27/22 89.4 kg (197 lb 1.5 oz)   06/10/21 87.8 kg (193 lb 7.3 oz)     Temp Readings from Last 3 Encounters:   07/27/22 97.7 °F (36.5 °C) (Tympanic)   06/10/21 97.8 °F (36.6 °C) (Tympanic)   05/06/21 96.6 °F (35.9 °C) (Tympanic)     BP Readings from Last 3 Encounters:   08/08/22 130/84   07/27/22 (!) 130/90   06/10/21 129/70     Pulse Readings from Last 3 Encounters:   08/08/22 98   07/27/22 74   06/10/21 67       Physical Exam  Vitals reviewed.   Constitutional:       Appearance: He is well-developed.   HENT:      Head: Normocephalic and atraumatic.   Eyes:      General: No scleral icterus.     Conjunctiva/sclera: Conjunctivae normal.   Cardiovascular:      Rate and Rhythm: Normal rate and regular rhythm.      Pulses: Intact distal pulses.      Heart sounds: Normal heart sounds. No murmur heard.  Pulmonary:      Effort: No respiratory distress.      Breath sounds: No wheezing or rales.   Chest:      Chest wall: No tenderness.   Abdominal:      General: Bowel sounds are  normal. There is no distension.      Palpations: Abdomen is soft.      Tenderness: There is no guarding.   Musculoskeletal:         General: Normal range of motion.      Cervical back: Normal range of motion and neck supple.   Skin:     General: Skin is warm.   Neurological:      Mental Status: He is alert and oriented to person, place, and time.         Lab Results   Component Value Date    CHOL 125 07/15/2022    CHOL 127 05/06/2021    CHOL 141 11/06/2019     Lab Results   Component Value Date    HDL 36 (L) 07/15/2022    HDL 33 (L) 05/06/2021    HDL 49 11/06/2019     Lab Results   Component Value Date    LDLCALC 64.4 07/15/2022    LDLCALC 69.2 05/06/2021    LDLCALC 77.2 11/06/2019     Lab Results   Component Value Date    TRIG 123 07/15/2022    TRIG 124 05/06/2021    TRIG 74 11/06/2019     Lab Results   Component Value Date    CHOLHDL 28.8 07/15/2022    CHOLHDL 26.0 05/06/2021    CHOLHDL 34.8 11/06/2019       Chemistry        Component Value Date/Time     07/15/2022 0827    K 4.4 07/15/2022 0827     07/15/2022 0827    CO2 23 07/15/2022 0827    BUN 15 07/15/2022 0827    CREATININE 0.7 07/15/2022 0827    GLU 89 07/15/2022 0827        Component Value Date/Time    CALCIUM 9.0 07/15/2022 0827    ALKPHOS 103 07/15/2022 0827    AST 21 07/15/2022 0827    ALT 26 07/15/2022 0827    BILITOT 0.7 07/15/2022 0827    ESTGFRAFRICA >60.0 07/15/2022 0827    EGFRNONAA >60.0 07/15/2022 0827          Lab Results   Component Value Date    TSH 2.39 08/04/2009     No results found for: INR, PROTIME  Lab Results   Component Value Date    WBC 5.48 07/15/2022    HGB 16.2 07/15/2022    HCT 47.9 07/15/2022    MCV 91 07/15/2022     07/15/2022     BNP  @LABRCNTIP(BNP,BNPTRIAGEBLO)@  CrCl cannot be calculated (Patient's most recent lab result is older than the maximum 7 days allowed.).     Imaging:  ======  No results found for this or any previous visit.    No results found for this or any previous visit.    No results found  for this or any previous visit.    Results for orders placed during the hospital encounter of 07/27/22    X-Ray Chest PA And Lateral    Narrative  EXAMINATION:  XR CHEST PA AND LATERAL    CLINICAL HISTORY:  Chest pain, unspecified    TECHNIQUE:  PA and lateral views of the chest were performed.    COMPARISON:  Prior radiographs    FINDINGS:  Cardiac silhouette and mediastinal contours are normal.  Lungs are clear.  Osseous structures are intact.    Impression  No acute cardiopulmonary process.      Electronically signed by: Yuri Bran MD  Date:    07/27/2022  Time:    11:43    No results found for this or any previous visit.    No valid procedures specified.    Diagnostic Results:  ECG: Reviewed  Sinus rhythm.  Normal ECG.  The ASCVD Risk score (Stephanie ROSITA Jr., et al., 2013) failed to calculate for the following reasons:    The valid total cholesterol range is 130 to 320 mg/dL    Assessment and Plan:   Chest pain, unspecified type  -     Nuclear Stress - Cardiology Interpreted; Future    Poliomyelitis osteopathy of left lower leg    Gastroesophageal reflux disease, unspecified whether esophagitis present    Elevated blood pressure reading without diagnosis of hypertension  Comments:  Check blood pressure more often at home.  Possible white coat.  However his blood pressure has been mildly elevated on multiple visits.    atypical chest pain   Reviewed all tests and above medical conditions with patient in detail and formulated treatment plan.  Risk factor modification discussed.   Cardiac low salt diet discussed.  Maintaining healthy weight and weight loss goals were discussed in clinic.    Follow up in 6 months

## 2022-08-16 ENCOUNTER — TELEPHONE (OUTPATIENT)
Dept: ADMINISTRATIVE | Facility: HOSPITAL | Age: 72
End: 2022-08-16
Payer: MEDICARE

## 2022-08-18 ENCOUNTER — OFFICE VISIT (OUTPATIENT)
Dept: DERMATOLOGY | Facility: CLINIC | Age: 72
End: 2022-08-18
Payer: MEDICARE

## 2022-08-18 DIAGNOSIS — L82.1 SEBORRHEIC KERATOSIS: ICD-10-CM

## 2022-08-18 DIAGNOSIS — D22.9 MULTIPLE NEVI: Primary | ICD-10-CM

## 2022-08-18 DIAGNOSIS — Z12.83 SKIN CANCER SCREENING: ICD-10-CM

## 2022-08-18 DIAGNOSIS — Z86.018 HISTORY OF DYSPLASTIC NEVUS: ICD-10-CM

## 2022-08-18 PROCEDURE — 1157F ADVNC CARE PLAN IN RCRD: CPT | Mod: CPTII,S$GLB,, | Performed by: DERMATOLOGY

## 2022-08-18 PROCEDURE — 99213 OFFICE O/P EST LOW 20 MIN: CPT | Mod: S$GLB,,, | Performed by: DERMATOLOGY

## 2022-08-18 PROCEDURE — 99213 PR OFFICE/OUTPT VISIT, EST, LEVL III, 20-29 MIN: ICD-10-PCS | Mod: S$GLB,,, | Performed by: DERMATOLOGY

## 2022-08-18 PROCEDURE — 1160F RVW MEDS BY RX/DR IN RCRD: CPT | Mod: CPTII,S$GLB,, | Performed by: DERMATOLOGY

## 2022-08-18 PROCEDURE — 99999 PR PBB SHADOW E&M-EST. PATIENT-LVL III: ICD-10-PCS | Mod: PBBFAC,,, | Performed by: DERMATOLOGY

## 2022-08-18 PROCEDURE — 1126F PR PAIN SEVERITY QUANTIFIED, NO PAIN PRESENT: ICD-10-PCS | Mod: CPTII,S$GLB,, | Performed by: DERMATOLOGY

## 2022-08-18 PROCEDURE — 3288F FALL RISK ASSESSMENT DOCD: CPT | Mod: CPTII,S$GLB,, | Performed by: DERMATOLOGY

## 2022-08-18 PROCEDURE — 1159F PR MEDICATION LIST DOCUMENTED IN MEDICAL RECORD: ICD-10-PCS | Mod: CPTII,S$GLB,, | Performed by: DERMATOLOGY

## 2022-08-18 PROCEDURE — 1157F PR ADVANCE CARE PLAN OR EQUIV PRESENT IN MEDICAL RECORD: ICD-10-PCS | Mod: CPTII,S$GLB,, | Performed by: DERMATOLOGY

## 2022-08-18 PROCEDURE — 1126F AMNT PAIN NOTED NONE PRSNT: CPT | Mod: CPTII,S$GLB,, | Performed by: DERMATOLOGY

## 2022-08-18 PROCEDURE — 1101F PT FALLS ASSESS-DOCD LE1/YR: CPT | Mod: CPTII,S$GLB,, | Performed by: DERMATOLOGY

## 2022-08-18 PROCEDURE — 3288F PR FALLS RISK ASSESSMENT DOCUMENTED: ICD-10-PCS | Mod: CPTII,S$GLB,, | Performed by: DERMATOLOGY

## 2022-08-18 PROCEDURE — 99999 PR PBB SHADOW E&M-EST. PATIENT-LVL III: CPT | Mod: PBBFAC,,, | Performed by: DERMATOLOGY

## 2022-08-18 PROCEDURE — 1160F PR REVIEW ALL MEDS BY PRESCRIBER/CLIN PHARMACIST DOCUMENTED: ICD-10-PCS | Mod: CPTII,S$GLB,, | Performed by: DERMATOLOGY

## 2022-08-18 PROCEDURE — 1159F MED LIST DOCD IN RCRD: CPT | Mod: CPTII,S$GLB,, | Performed by: DERMATOLOGY

## 2022-08-18 PROCEDURE — 1101F PR PT FALLS ASSESS DOC 0-1 FALLS W/OUT INJ PAST YR: ICD-10-PCS | Mod: CPTII,S$GLB,, | Performed by: DERMATOLOGY

## 2022-08-18 NOTE — PROGRESS NOTES
Subjective:       Patient ID:  Olivier Cuenca is a 71 y.o. male who presents for   Chief Complaint   Patient presents with    Skin Check     Fbse    Denies family or personal hx of skin cancer/melanoma.      Mildly atypical nevus of the midline upper back and right upper back (s/p biopsy on 3/8/18), and multiple nevi, last seen by Dr. Mathews on 5/12/21.  This is a high risk patient here to check for the development of new lesions. Denies bleeding, tender, growing, or concerning lesions.         Review of Systems   Constitutional: Negative for fever and chills.   Gastrointestinal: Negative for nausea and vomiting.   Skin: Positive for activity-related sunscreen use. Negative for daily sunscreen use and recent sunburn.   Hematologic/Lymphatic: Does not bruise/bleed easily.        Objective:    Physical Exam   Constitutional: He appears well-developed and well-nourished. No distress.   Neurological: He is alert and oriented to person, place, and time. He is not disoriented.   Psychiatric: He has a normal mood and affect.   Skin:   Areas Examined (abnormalities noted in diagram):   Head / Face Inspection Performed  Neck Inspection Performed  Chest / Axilla Inspection Performed  Abdomen Inspection Performed  Back Inspection Performed  RUE Inspected  LUE Inspection Performed  Nails and Digits Inspection Performed                   Diagram Legend     Erythematous scaling macule/papule c/w actinic keratosis       Vascular papule c/w angioma      Pigmented verrucoid papule/plaque c/w seborrheic keratosis      Yellow umbilicated papule c/w sebaceous hyperplasia      Irregularly shaped tan macule c/w lentigo     1-2 mm smooth white papules consistent with Milia      Movable subcutaneous cyst with punctum c/w epidermal inclusion cyst      Subcutaneous movable cyst c/w pilar cyst      Firm pink to brown papule c/w dermatofibroma      Pedunculated fleshy papule(s) c/w skin tag(s)      Evenly pigmented macule c/w junctional  nevus     Mildly variegated pigmented, slightly irregular-bordered macule c/w mildly atypical nevus      Flesh colored to evenly pigmented papule c/w intradermal nevus       Pink pearly papule/plaque c/w basal cell carcinoma      Erythematous hyperkeratotic cursted plaque c/w SCC      Surgical scar with no sign of skin cancer recurrence      Open and closed comedones      Inflammatory papules and pustules      Verrucoid papule consistent consistent with wart     Erythematous eczematous patches and plaques     Dystrophic onycholytic nail with subungual debris c/w onychomycosis     Umbilicated papule    Erythematous-base heme-crusted tan verrucoid plaque consistent with inflamed seborrheic keratosis     Erythematous Silvery Scaling Plaque c/w Psoriasis     See annotation      Assessment / Plan:        Multiple nevi  History of dysplastic nevus  Reassurance given.  Discussed ABCDEF of melanoma and changes for patient to look for.  AAD Handout given. Discussed importance of daily use of sunscreen which is broad-spectrum and has a minimum SPF of 30.    Skin cancer screening  -     Ambulatory referral/consult to Dermatology    Seborrheic keratosis  Reassurance given. Discussed diagnosis and that lesions are benign.  AAD handout given.                  Follow up in about 1 year (around 8/18/2023).

## 2022-08-18 NOTE — PATIENT INSTRUCTIONS
Monitoring Moles  Moles, also called nevi, are small, pigmented (colored) marks on the skin. They have no known purpose. Many moles appear before age 30, but they also increase frequently as people age. Moles most often are benign (not cancer) and harmless. But some become cancerous. Thats why you need to watch the moles on your body and tell your health care provider about any concern you.    What are moles?  Moles are a type of pigmented kendall. Freckles, which often are sprinkled across the bridge of the nose, the cheeks, and the arms, are another type of pigmented kendall. Moles can appear on any part of the body. There are many types, sizes, and shapes of moles. Most moles are solid brown. In most cases, they are flat or dome-shaped, smooth, and have well-defined edges. Freckles are flat.  Why worry about moles?  Most moles are benign and dont require treatment. You can have moles removed if you dont like the way they look or feel. But moles that appear after you are 30 or that change in certain ways may become a problem. These moles may turn into melanoma, a type of skin cancer. Melanoma is one of the fastest growing cancers in the [place], but it is often curable if caught early. But this disease can be life-threatening, particularly when not diagnosed early. The more moles someone has, the higher the risk. Risk is also higher for those who have had more lifetime exposure to the sun, severe blistering sunburns, exposure to tanning beds, a prior personal history of cancer, and those with a family history of skin cancer. To manage your risk, its smart to check your moles for changes and ask your health care provider to perform a thorough skin exam when you have a physical exam. To do this, you first need to learn where your moles are. Then, be sure to check your moles each month.    Checking your moles  You can check many of your moles each month. You can do this right after you shower and before you get  dressed. Check your body from head to toe. Then, make a list of your moles. If you find any new moles or changes in your moles, call your health care provider. To check your moles, youll need:  A full-length mirror  A stool or chair to sit on while you check your feet  If you have a lot of moles, take digital photos of them each month. Make sure to take photos both up close and from a distance. These can help you see if any moles change over time.  When to seek medical treatment  See your health care provider if your moles hurt, itch, ooze, bleed, thicken, become crusty, or show other changes. Also, be sure to call your health care provider if your moles show any of the following signs of melanoma:  A change in size, shape, color, or elevation  Asymmetry (when the sides dont match)  Ragged, notched, or blurred borders  Varied colors within the same mole  Size is larger than 5 mm or 6 mm in diameter (the size of a pencil eraser)  © 8991-6733 [employer]. [address] [place] [address]. All rights reserved. This information is not intended as a substitute for professional medical care. Always follow your healthcare professional's instructions.

## 2022-09-12 ENCOUNTER — HOSPITAL ENCOUNTER (OUTPATIENT)
Dept: CARDIOLOGY | Facility: HOSPITAL | Age: 72
Discharge: HOME | End: 2022-09-12
Attending: INTERNAL MEDICINE
Payer: MEDICARE

## 2022-09-12 ENCOUNTER — HOSPITAL ENCOUNTER (OUTPATIENT)
Dept: RADIOLOGY | Facility: HOSPITAL | Age: 72
Discharge: HOME | End: 2022-09-12
Attending: INTERNAL MEDICINE
Payer: MEDICARE

## 2022-09-12 DIAGNOSIS — R07.9 CHEST PAIN, UNSPECIFIED TYPE: ICD-10-CM

## 2022-09-12 LAB
CV STRESS BASE HR: 70 BPM
DIASTOLIC BLOOD PRESSURE: 88 MMHG
NUC REST EJECTION FRACTION: 77
NUC STRESS EJECTION FRACTION: 72 %
OHS CV CPX 85 PERCENT MAX PREDICTED HEART RATE MALE: 127
OHS CV CPX ESTIMATED METS: 1
OHS CV CPX MAX PREDICTED HEART RATE: 149
OHS CV CPX PATIENT IS FEMALE: 0
OHS CV CPX PATIENT IS MALE: 1
OHS CV CPX PEAK DIASTOLIC BLOOD PRESSURE: 82 MMHG
OHS CV CPX PEAK HEAR RATE: 110 BPM
OHS CV CPX PEAK RATE PRESSURE PRODUCT: NORMAL
OHS CV CPX PEAK SYSTOLIC BLOOD PRESSURE: 131 MMHG
OHS CV CPX PERCENT MAX PREDICTED HEART RATE ACHIEVED: 74
OHS CV CPX RATE PRESSURE PRODUCT PRESENTING: 9940
STRESS ECHO POST EXERCISE DUR MIN: 1 MINUTES
STRESS ECHO POST EXERCISE DUR SEC: 4 SECONDS
SYSTOLIC BLOOD PRESSURE: 142 MMHG

## 2022-09-12 PROCEDURE — 78452 STRESS TEST WITH MYOCARDIAL PERFUSION (CUPID ONLY): ICD-10-PCS | Mod: 26,,, | Performed by: INTERNAL MEDICINE

## 2022-09-12 PROCEDURE — 78452 HT MUSCLE IMAGE SPECT MULT: CPT | Mod: 26,,, | Performed by: INTERNAL MEDICINE

## 2022-09-12 PROCEDURE — 93018 STRESS TEST WITH MYOCARDIAL PERFUSION (CUPID ONLY): ICD-10-PCS | Mod: ,,, | Performed by: INTERNAL MEDICINE

## 2022-09-12 PROCEDURE — 93016 CV STRESS TEST SUPVJ ONLY: CPT | Mod: ,,, | Performed by: INTERNAL MEDICINE

## 2022-09-12 PROCEDURE — A9502 TC99M TETROFOSMIN: HCPCS

## 2022-09-12 PROCEDURE — 93018 CV STRESS TEST I&R ONLY: CPT | Mod: ,,, | Performed by: INTERNAL MEDICINE

## 2022-09-12 PROCEDURE — 93017 CV STRESS TEST TRACING ONLY: CPT

## 2022-09-12 PROCEDURE — 63600175 PHARM REV CODE 636 W HCPCS: Performed by: INTERNAL MEDICINE

## 2022-09-12 PROCEDURE — 93016 STRESS TEST WITH MYOCARDIAL PERFUSION (CUPID ONLY): ICD-10-PCS | Mod: ,,, | Performed by: INTERNAL MEDICINE

## 2022-09-12 RX ORDER — REGADENOSON 0.08 MG/ML
0.4 INJECTION, SOLUTION INTRAVENOUS ONCE
Status: COMPLETED | OUTPATIENT
Start: 2022-09-12 | End: 2022-09-12

## 2022-09-12 RX ADMIN — REGADENOSON 0.4 MG: 0.08 INJECTION, SOLUTION INTRAVENOUS at 10:09

## 2022-09-26 ENCOUNTER — TELEPHONE (OUTPATIENT)
Dept: CARDIOLOGY | Facility: CLINIC | Age: 72
End: 2022-09-26
Payer: MEDICARE

## 2022-09-26 NOTE — TELEPHONE ENCOUNTER
Call about abnormal stress test result.    Continues to have chest pain.    Explained left heart catheterization with possible PCI.  Patient agreeable with the plan.     Results for orders placed during the hospital encounter of 09/12/22    Nuclear Stress - Cardiology Interpreted    Interpretation Summary    Possible mild inferior ischemia.    The gated perfusion images showed an ejection fraction of 77% at rest. The gated perfusion images showed an ejection fraction of 72% post stress.    The EKG portion of this study is negative for ischemia.    There were no arrhythmias during stress.

## 2022-09-26 NOTE — TELEPHONE ENCOUNTER
Left heart Cath has been scheduled for 10/7 at 11am    ----- Message from Analilia Rincon LPN sent at 9/26/2022  3:46 PM CDT -----  Regarding: [facility] with Possible PCI  When you get the chance Ms. Jones , may you please assist. Thanks always :)    ----- Message -----  From: Pelon Lombardo MD  Sent: 9/26/2022   2:15 PM CDT  To: Analilia Rincon LPN    Please set up for a left heart catheterization with possible PCI on October 7th thanks abnormal stress test

## 2022-09-27 ENCOUNTER — PATIENT MESSAGE (OUTPATIENT)
Dept: CARDIOLOGY | Facility: CLINIC | Age: 72
End: 2022-09-27
Payer: MEDICARE

## 2022-09-27 DIAGNOSIS — R07.9 CHEST PAIN, UNSPECIFIED TYPE: ICD-10-CM

## 2022-09-27 DIAGNOSIS — R94.39 ABNORMAL STRESS TEST: Primary | ICD-10-CM

## 2022-10-05 ENCOUNTER — LAB VISIT (OUTPATIENT)
Dept: LAB | Facility: HOSPITAL | Age: 72
End: 2022-10-05
Attending: INTERNAL MEDICINE
Payer: MEDICARE

## 2022-10-05 DIAGNOSIS — R07.9 CHEST PAIN, UNSPECIFIED TYPE: ICD-10-CM

## 2022-10-05 DIAGNOSIS — R94.39 ABNORMAL STRESS TEST: ICD-10-CM

## 2022-10-05 LAB
ANION GAP SERPL CALC-SCNC: 11 MMOL/L (ref 8–16)
APTT BLDCRRT: 26.7 SEC (ref 21–32)
BUN SERPL-MCNC: 17 MG/DL (ref 8–23)
CALCIUM SERPL-MCNC: 9.5 MG/DL (ref 8.7–10.5)
CHLORIDE SERPL-SCNC: 108 MMOL/L (ref 95–110)
CO2 SERPL-SCNC: 19 MMOL/L (ref 23–29)
CREAT SERPL-MCNC: 0.8 MG/DL (ref 0.5–1.4)
ERYTHROCYTE [DISTWIDTH] IN BLOOD BY AUTOMATED COUNT: 12.7 % (ref 11.5–14.5)
EST. GFR  (NO RACE VARIABLE): >60 ML/MIN/1.73 M^2
GLUCOSE SERPL-MCNC: 89 MG/DL (ref 70–110)
HCT VFR BLD AUTO: 48 % (ref 40–54)
HGB BLD-MCNC: 16 G/DL (ref 14–18)
INR PPP: 1 (ref 0.8–1.2)
MCH RBC QN AUTO: 30.8 PG (ref 27–31)
MCHC RBC AUTO-ENTMCNC: 33.3 G/DL (ref 32–36)
MCV RBC AUTO: 92 FL (ref 82–98)
PLATELET # BLD AUTO: 218 K/UL (ref 150–450)
PMV BLD AUTO: 11.2 FL (ref 9.2–12.9)
POTASSIUM SERPL-SCNC: 4.1 MMOL/L (ref 3.5–5.1)
PROTHROMBIN TIME: 10.7 SEC (ref 9–12.5)
RBC # BLD AUTO: 5.2 M/UL (ref 4.6–6.2)
SODIUM SERPL-SCNC: 138 MMOL/L (ref 136–145)
WBC # BLD AUTO: 6.31 K/UL (ref 3.9–12.7)

## 2022-10-05 PROCEDURE — 85610 PROTHROMBIN TIME: CPT | Performed by: INTERNAL MEDICINE

## 2022-10-05 PROCEDURE — 80048 BASIC METABOLIC PNL TOTAL CA: CPT | Performed by: INTERNAL MEDICINE

## 2022-10-05 PROCEDURE — 85027 COMPLETE CBC AUTOMATED: CPT | Performed by: INTERNAL MEDICINE

## 2022-10-05 PROCEDURE — 85730 THROMBOPLASTIN TIME PARTIAL: CPT | Performed by: INTERNAL MEDICINE

## 2022-10-05 PROCEDURE — 36415 COLL VENOUS BLD VENIPUNCTURE: CPT | Mod: PO | Performed by: INTERNAL MEDICINE

## 2022-10-07 ENCOUNTER — HOSPITAL ENCOUNTER (OUTPATIENT)
Facility: HOSPITAL | Age: 72
Discharge: HOME | End: 2022-10-07
Attending: INTERNAL MEDICINE | Admitting: INTERNAL MEDICINE
Payer: MEDICARE

## 2022-10-07 VITALS
RESPIRATION RATE: 18 BRPM | BODY MASS INDEX: 29.18 KG/M2 | SYSTOLIC BLOOD PRESSURE: 115 MMHG | TEMPERATURE: 98 F | OXYGEN SATURATION: 98 % | HEART RATE: 63 BPM | HEIGHT: 69 IN | DIASTOLIC BLOOD PRESSURE: 67 MMHG | WEIGHT: 197 LBS

## 2022-10-07 DIAGNOSIS — R94.39 ABNORMAL STRESS TEST: ICD-10-CM

## 2022-10-07 LAB — POCT GLUCOSE: 104 MG/DL (ref 70–110)

## 2022-10-07 PROCEDURE — C1894 INTRO/SHEATH, NON-LASER: HCPCS | Performed by: INTERNAL MEDICINE

## 2022-10-07 PROCEDURE — 25500020 PHARM REV CODE 255: Performed by: INTERNAL MEDICINE

## 2022-10-07 PROCEDURE — 99152 MOD SED SAME PHYS/QHP 5/>YRS: CPT | Mod: ,,, | Performed by: INTERNAL MEDICINE

## 2022-10-07 PROCEDURE — 93458 L HRT ARTERY/VENTRICLE ANGIO: CPT | Mod: 26,,, | Performed by: INTERNAL MEDICINE

## 2022-10-07 PROCEDURE — 99152 MOD SED SAME PHYS/QHP 5/>YRS: CPT | Performed by: INTERNAL MEDICINE

## 2022-10-07 PROCEDURE — 25000003 PHARM REV CODE 250: Performed by: INTERNAL MEDICINE

## 2022-10-07 PROCEDURE — 99152 PR MOD CONSCIOUS SEDATION, SAME PHYS, 5+ YRS, FIRST 15 MIN: ICD-10-PCS | Mod: ,,, | Performed by: INTERNAL MEDICINE

## 2022-10-07 PROCEDURE — 63600175 PHARM REV CODE 636 W HCPCS: Performed by: INTERNAL MEDICINE

## 2022-10-07 PROCEDURE — 93458 PR CATH PLACE/CORON ANGIO, IMG SUPER/INTERP,W LEFT HEART VENTRICULOGRAPHY: ICD-10-PCS | Mod: 26,,, | Performed by: INTERNAL MEDICINE

## 2022-10-07 PROCEDURE — 27201423 OPTIME MED/SURG SUP & DEVICES STERILE SUPPLY: Performed by: INTERNAL MEDICINE

## 2022-10-07 PROCEDURE — 93458 L HRT ARTERY/VENTRICLE ANGIO: CPT | Performed by: INTERNAL MEDICINE

## 2022-10-07 RX ORDER — ACETAMINOPHEN 325 MG/1
650 TABLET ORAL EVERY 4 HOURS PRN
Status: DISCONTINUED | OUTPATIENT
Start: 2022-10-07 | End: 2022-10-07 | Stop reason: HOSPADM

## 2022-10-07 RX ORDER — METOPROLOL SUCCINATE 25 MG/1
25 TABLET, EXTENDED RELEASE ORAL DAILY
Qty: 30 TABLET | Refills: 11 | Status: SHIPPED | OUTPATIENT
Start: 2022-10-07 | End: 2022-10-07 | Stop reason: HOSPADM

## 2022-10-07 RX ORDER — NAPROXEN SODIUM 220 MG/1
81 TABLET, FILM COATED ORAL DAILY
COMMUNITY
End: 2022-11-22

## 2022-10-07 RX ORDER — FENTANYL CITRATE 50 UG/ML
INJECTION, SOLUTION INTRAMUSCULAR; INTRAVENOUS
Status: DISCONTINUED | OUTPATIENT
Start: 2022-10-07 | End: 2022-10-07 | Stop reason: HOSPADM

## 2022-10-07 RX ORDER — ONDANSETRON 8 MG/1
8 TABLET, ORALLY DISINTEGRATING ORAL EVERY 8 HOURS PRN
Status: DISCONTINUED | OUTPATIENT
Start: 2022-10-07 | End: 2022-10-07 | Stop reason: HOSPADM

## 2022-10-07 RX ORDER — HEPARIN SODIUM 1000 [USP'U]/ML
INJECTION, SOLUTION INTRAVENOUS; SUBCUTANEOUS
Status: DISCONTINUED | OUTPATIENT
Start: 2022-10-07 | End: 2022-10-07 | Stop reason: HOSPADM

## 2022-10-07 RX ORDER — IODIXANOL 320 MG/ML
INJECTION, SOLUTION INTRAVASCULAR
Status: DISCONTINUED | OUTPATIENT
Start: 2022-10-07 | End: 2022-10-07 | Stop reason: HOSPADM

## 2022-10-07 RX ORDER — SODIUM CHLORIDE 9 MG/ML
INJECTION, SOLUTION INTRAVENOUS CONTINUOUS
Status: DISCONTINUED | OUTPATIENT
Start: 2022-10-07 | End: 2022-10-07 | Stop reason: HOSPADM

## 2022-10-07 RX ORDER — DIPHENHYDRAMINE HCL 50 MG
50 CAPSULE ORAL ONCE
Status: COMPLETED | OUTPATIENT
Start: 2022-10-07 | End: 2022-10-07

## 2022-10-07 RX ORDER — SODIUM CHLORIDE 9 MG/ML
INJECTION, SOLUTION INTRAVENOUS CONTINUOUS
Status: ACTIVE | OUTPATIENT
Start: 2022-10-07 | End: 2022-10-07

## 2022-10-07 RX ORDER — MIDAZOLAM HYDROCHLORIDE 1 MG/ML
INJECTION, SOLUTION INTRAMUSCULAR; INTRAVENOUS
Status: DISCONTINUED | OUTPATIENT
Start: 2022-10-07 | End: 2022-10-07 | Stop reason: HOSPADM

## 2022-10-07 RX ORDER — NAPROXEN SODIUM 220 MG/1
81 TABLET, FILM COATED ORAL ONCE
Status: COMPLETED | OUTPATIENT
Start: 2022-10-07 | End: 2022-10-07

## 2022-10-07 RX ORDER — NITROGLYCERIN 5 MG/ML
INJECTION, SOLUTION INTRAVENOUS
Status: DISCONTINUED | OUTPATIENT
Start: 2022-10-07 | End: 2022-10-07 | Stop reason: HOSPADM

## 2022-10-07 RX ORDER — LIDOCAINE HYDROCHLORIDE 20 MG/ML
INJECTION, SOLUTION EPIDURAL; INFILTRATION; INTRACAUDAL; PERINEURAL
Status: DISCONTINUED | OUTPATIENT
Start: 2022-10-07 | End: 2022-10-07 | Stop reason: HOSPADM

## 2022-10-07 RX ADMIN — ASPIRIN 81 MG CHEWABLE TABLET 81 MG: 81 TABLET CHEWABLE at 09:10

## 2022-10-07 RX ADMIN — DIPHENHYDRAMINE HYDROCHLORIDE 50 MG: 50 CAPSULE ORAL at 09:10

## 2022-10-07 RX ADMIN — SODIUM CHLORIDE: 0.9 INJECTION, SOLUTION INTRAVENOUS at 09:10

## 2022-10-07 NOTE — DISCHARGE SUMMARY
[facility] ([facility])  Cardiology  Discharge Summary      Patient Name: Olivier Cuenca  MRN: [id number]  Admission Date: 10/7/2022  Hospital Length of Stay: 0 days  Discharge Date and Time: No discharge date for patient encounter.  Attending Physician: Pelon Lombardo MD    Discharging Provider: Pelon Lombardo MD  Primary Care Physician: Gucci Giang MD    HPI:   No notes on file    Procedure(s) (LRB):  CATHETERIZATION, HEART, LEFT (Left)  ANGIOGRAM, CORONARY ARTERY (N/A)     Indwelling Lines/Drains at time of discharge:  Lines/Drains/Airways       Airway  Duration                  Airway - Non-Surgical Nasal Cannula -- days                    Hospital Course:  No notes on file    Goals of Care Treatment Preferences:       Living Will: Yes              Consults:     Significant Diagnostic Studies: Angiography: Results for orders placed during the hospital encounter of 10/07/22    Cardiac catheterization    Conclusion    The pre-procedure left ventricular end diastolic pressure was 8.    The estimated blood loss was none.    There was non-obstructive coronary artery disease.    Mid LAD bridging    The procedure log was documented by Documenter: Cely Antonio RN and verified by Pelon Lombardo MD.    Date: 10/7/2022  Time: 11:28 AM      Pending Diagnostic Studies:       None            There are no hospital problems to display for this patient.    Assessment & plan notes cannot be loaded without a specified hospital service.      Discharged Condition: good    Disposition: Home or Self Care    Follow Up:    Patient Instructions:   No discharge procedures on file.  Medications:  Reconciled Home Medications:      Medication List        START taking these medications      metoprolol succinate 25 MG 24 hr tablet  Commonly known as: TOPROL-XL  Take 1 tablet (25 mg total) by mouth once daily.            ASK your doctor about these medications      aspirin 81 MG Chew  Take 81 mg by mouth once daily.      tadalafiL 5 MG tablet  Commonly known as: CIALIS  Take 1 tablet (5 mg total) by mouth once daily. **May Cause Dizziness** *DO NOT TAKE WITH NITRATE *     valACYclovir 1000 MG tablet  Commonly known as: VALTREX  2 at onset cold sore, repeat 2 in 12 hours              Time spent on the discharge of patient: 30 minutes    Pelon Lombardo MD  Cardiology  [facility] ([facility])

## 2022-10-07 NOTE — Clinical Note
The left ventricle was injected. The injected rate was 12 mL/sec. The total injected volume was 24 mL.

## 2022-10-07 NOTE — Clinical Note
The radial band was applied to the left radial artery. 10 cc's of air were inserted into the closure device.

## 2022-10-07 NOTE — Clinical Note
The left radial was prepped. The site was prepped with ChloraPrep. The site was clipped. The patient was draped. The patient was positioned supine.

## 2022-10-07 NOTE — PLAN OF CARE
Alert and orinted. Discharge inst. Reviewed with patient and son and copy given.  L radial site intact.  Immobilizer in place.  IV removed. 1320 discharged home.  To front doors via wheelchair

## 2022-10-07 NOTE — DISCHARGE INSTRUCTIONS
"    1. DIET: It is advisable for you to follow a diet that limits the intake of salt, sugar, saturated fats and cholesterol.     2. DRIVING: Due to sedation you received during your procedure, DO NOT drive or operate machinery for 24 hours. Avoid making critical decisions or signing legal documents until tomorrow.    3. ACTIVITY: AVOID activities that require bending of the affected arm/wrist for 3 days and submerging the site in water for 3 days. REMOVE the dressing the day after  the procedure, and shower.  Apply a bandaid to site after shower.  WEAR wrist immobilizer until tomorrow night.    You may RESUME your normal activities or prescribed exercise program as instructed by your physician after 3 days.                                                                                                                 4. WOUND CARE: It is not unusual to have a small amount of bruising to appear at or near the puncture site. It is also common to have a tender "knot" develop beneath the skin at the puncture site of the wrist/arm. This is usually scar tissue and is not a cause for concern or alarm. This tender knot may take several weeks to fully resolve. The bruise will usually spread over several days. However, if the lump gets bigger, call your doctor immediately.    5. DISCOMFORT: For general discomfort at the puncture site, you may take 1 or 2 Acetaminophen (Tylenol) tablets every 4 - 6 hours as needed. (Do not take more than 4000 mg a day)    6. SIGNS AND SYMPTOMS TO REPORT:  Call your physician immediately if any of the following occur:                                            1. Loss of feeling, warmth or color to the affected arm/wrist                                                                                                          2. Mild beeding from the site                 3. Pain that is sudden, sharp or persistent in the affected arm/wrist                 4. Swelling or a change in "lump" size, " "increased redness or drainage at the puncture site                                                                               5. High fever (101 degrees or higher)    7. GO TO  THE EMERGENCY ROOM OR CALL 911 IF YOU HAVE: Chest pains or discomforts not relieved with 3 nitroglycerin doses (sublingual tablets or spray), numbness or severe pain of limb, if your limb becomes cold or discolored or if you develop uncontrolled bleeding from the puncture site (quickly apply firm, direct pressure above the site).     1. DIET: It is advisable for you to follow a diet that limits the intake of salt, sugar, saturated fats and cholesterol.     2. DRIVING: Due to sedation you received during your procedure, DO NOT drive or operate machinery for 24 hours. Avoid making critical decisions or signing legal documents until tomorrow.    3. ACTIVITY: AVOID activities that require bending of the affected arm/wrist for 3 days and submerging the site in water for 3 days. REMOVE the dressing the day after  the procedure, and shower.  Apply a bandaid to site after shower.  WEAR wrist immobilizer until tomorrow night.    You may RESUME your normal activities or prescribed exercise program as instructed by your physician after 3 days.                                                                                                                 4. WOUND CARE: It is not unusual to have a small amount of bruising to appear at or near the puncture site. It is also common to have a tender "knot" develop beneath the skin at the puncture site of the wrist/arm. This is usually scar tissue and is not a cause for concern or alarm. This tender knot may take several weeks to fully resolve. The bruise will usually spread over several days. However, if the lump gets bigger, call your doctor immediately.    5. DISCOMFORT: For general discomfort at the puncture site, you may take 1 or 2 Acetaminophen (Tylenol) tablets every 4 - 6 hours as needed. (Do not " "take more than 4000 mg a day)    6. SIGNS AND SYMPTOMS TO REPORT:  Call your physician immediately if any of the following occur:                                            1. Loss of feeling, warmth or color to the affected arm/wrist                                                                                                          2. Mild beeding from the site                 3. Pain that is sudden, sharp or persistent in the affected arm/wrist                 4. Swelling or a change in "lump" size, increased redness or drainage at the puncture site                                                                               5. High fever (101 degrees or higher)    7. GO TO  THE EMERGENCY ROOM OR CALL 911 IF YOU HAVE: Chest pains or discomforts not relieved with 3 nitroglycerin doses (sublingual tablets or spray), numbness or severe pain of limb, if your limb becomes cold or discolored or if you develop uncontrolled bleeding from the puncture site (quickly apply firm, direct pressure above the site).   "

## 2022-10-07 NOTE — H&P
Subjective:   Patient ID:  Olivier Cuenca is a 71 y.o. male who presents for evaluation of No chief complaint on file.      HPI  10.7.2022  Comes in for a [facility] after abnormal stress test   Results for orders placed during the hospital encounter of 09/12/22    Nuclear Stress - Cardiology Interpreted    Interpretation Summary    Possible mild inferior ischemia.    The gated perfusion images showed an ejection fraction of 77% at rest. The gated perfusion images showed an ejection fraction of 72% post stress.    The EKG portion of this study is negative for ischemia.    There were no arrhythmias during stress.    8.2022  72 yo male , 20 PQY, quit 37 years ago ,   Comes in for evaluation of chest pain, started about 3 weeks ago, was having sharp, left sided chest pain, parasternal mild right and left sided pains, seconds lasting , not with exertion   States when he is wore up and busy , no chest pain   He also uses crutches at home.  Not tender on exam.  No dyspnea on exertion.      Past Medical History:   Diagnosis Date    BPH (benign prostatic hyperplasia)     Colon polyp     Erectile dysfunction     Ex-smoker     quit 1980s    Fatty liver     Herpes simplex     History of poliomyelitis     Migraine     Thalassemia minor        Past Surgical History:   Procedure Laterality Date    APPENDECTOMY      CHOLECYSTECTOMY      COLONOSCOPY N/A 2/28/2018    Procedure: COLONOSCOPY;  Surgeon: Bo Moreno MD;  Location: [facility];  Service: Endoscopy;  Laterality: N/A;  Pt requesting no anesthesia - no sedation.  Will not have .    COLONOSCOPY N/A 6/10/2021    Procedure: COLONOSCOPY;  Surgeon: Juancho Spicer MD;  Location: [facility];  Service: Endoscopy;  Laterality: N/A;    FEMUR FRACTURE SURGERY  02/2020    after fall from deer stand    HERNIA REPAIR         Social History     Tobacco Use    Smoking status: Former     Types: Cigarettes     Start date: 3/13/1964     Quit date: 3/13/1985     Years since quitting:  37.5    Smokeless tobacco: Never   Substance Use Topics    Alcohol use: No    Drug use: Yes     Types: Marijuana       Family History   Problem Relation Age of Onset    Dementia Mother     Hyperlipidemia Father     Melanoma Neg Hx        Review of Systems   Constitutional: Negative for fever and malaise/fatigue.   HENT:  Negative for sore throat.    Eyes:  Negative for blurred vision.   Cardiovascular:  Positive for chest pain. Negative for claudication, cyanosis, dyspnea on exertion, irregular heartbeat, leg swelling, near-syncope, orthopnea, palpitations, paroxysmal nocturnal dyspnea and syncope.   Respiratory:  Negative for cough and hemoptysis.    Hematologic/Lymphatic: Negative for bleeding problem.   Skin:  Negative for rash.   Musculoskeletal:  Negative for falls.   Gastrointestinal:  Negative for abdominal pain.   Genitourinary: Negative.    Neurological: Negative.    Psychiatric/Behavioral:  Negative for altered mental status and substance abuse.      No current facility-administered medications on file prior to encounter.     Current Outpatient Medications on File Prior to Encounter   Medication Sig    tadalafiL (CIALIS) 5 MG tablet Take 1 tablet (5 mg total) by mouth once daily. **May Cause Dizziness** *DO NOT TAKE WITH NITRATE *    valACYclovir (VALTREX) 1000 MG tablet 2 at onset cold sore, repeat 2 in 12 hours (Patient not taking: Reported on 8/18/2022)       Objective:   Objective:  Wt Readings from Last 3 Encounters:   10/07/22 89.4 kg (197 lb)   08/08/22 89.4 kg (197 lb 1.5 oz)   07/27/22 89.4 kg (197 lb 1.5 oz)     Temp Readings from Last 3 Encounters:   10/07/22 97.7 °F (36.5 °C) (Temporal)   07/27/22 97.7 °F (36.5 °C) (Tympanic)   06/10/21 97.8 °F (36.6 °C) (Tympanic)     BP Readings from Last 3 Encounters:   10/07/22 (!) 157/73   08/08/22 130/84   07/27/22 (!) 130/90     Pulse Readings from Last 3 Encounters:   10/07/22 77   08/08/22 98   07/27/22 74       Physical Exam  Vitals reviewed.    Constitutional:       Appearance: He is well-developed.   HENT:      Head: Normocephalic and atraumatic.   Eyes:      General: No scleral icterus.     Conjunctiva/sclera: Conjunctivae normal.   Cardiovascular:      Rate and Rhythm: Normal rate and regular rhythm.      Pulses: Intact distal pulses.      Heart sounds: Normal heart sounds. No murmur heard.  Pulmonary:      Effort: No respiratory distress.      Breath sounds: No wheezing or rales.   Chest:      Chest wall: No tenderness.   Abdominal:      General: Bowel sounds are normal. There is no distension.      Palpations: Abdomen is soft.      Tenderness: There is no guarding.   Musculoskeletal:         General: Normal range of motion.      Cervical back: Normal range of motion and neck supple.   Skin:     General: Skin is warm.   Neurological:      Mental Status: He is alert and oriented to person, place, and time.       Lab Results   Component Value Date    CHOL 125 07/15/2022    CHOL 127 05/06/2021    CHOL 141 11/06/2019     Lab Results   Component Value Date    HDL 36 (L) 07/15/2022    HDL 33 (L) 05/06/2021    HDL 49 11/06/2019     Lab Results   Component Value Date    LDLCALC 64.4 07/15/2022    LDLCALC 69.2 05/06/2021    LDLCALC 77.2 11/06/2019     Lab Results   Component Value Date    TRIG 123 07/15/2022    TRIG 124 05/06/2021    TRIG 74 11/06/2019     Lab Results   Component Value Date    CHOLHDL 28.8 07/15/2022    CHOLHDL 26.0 05/06/2021    CHOLHDL 34.8 11/06/2019       Chemistry        Component Value Date/Time     10/05/2022 0932    K 4.1 10/05/2022 0932     10/05/2022 0932    CO2 19 (L) 10/05/2022 0932    BUN 17 10/05/2022 0932    CREATININE 0.8 10/05/2022 0932    GLU 89 10/05/2022 0932        Component Value Date/Time    CALCIUM 9.5 10/05/2022 0932    ALKPHOS 103 07/15/2022 0827    AST 21 07/15/2022 0827    ALT 26 07/15/2022 0827    BILITOT 0.7 07/15/2022 0827    ESTGFRAFRICA >60.0 07/15/2022 0827    EGFRNONAA >60.0 07/15/2022 0827           Lab Results   Component Value Date    TSH 2.39 08/04/2009     Lab Results   Component Value Date    INR 1.0 10/05/2022     Lab Results   Component Value Date    WBC 6.31 10/05/2022    HGB 16.0 10/05/2022    HCT 48.0 10/05/2022    MCV 92 10/05/2022     10/05/2022     BNP  @LABRCNTIP(BNP,BNPTRIAGEBLO)@  Estimated Creatinine Clearance: 93.7 mL/min (based on SCr of 0.8 mg/dL).     Imaging:  ======  No results found for this or any previous visit.    No results found for this or any previous visit.    No results found for this or any previous visit.    Results for orders placed during the hospital encounter of 07/27/22    X-Ray Chest PA And Lateral    Narrative  EXAMINATION:  XR CHEST PA AND LATERAL    CLINICAL HISTORY:  Chest pain, unspecified    TECHNIQUE:  PA and lateral views of the chest were performed.    COMPARISON:  Prior radiographs    FINDINGS:  Cardiac silhouette and mediastinal contours are normal.  Lungs are clear.  Osseous structures are intact.    Impression  No acute cardiopulmonary process.      Electronically signed by: Yuri Bran MD  Date:    07/27/2022  Time:    11:43    No results found for this or any previous visit.    No valid procedures specified.    Diagnostic Results:  ECG: Reviewed  Sinus rhythm.  Normal ECG.  The ASCVD Risk score ([place] DK, et al., 2019) failed to calculate for the following reasons:    The valid total cholesterol range is 130 to 320 mg/dL    Assessment and Plan:   Abnormal stress test  -     Vital signs; Standing  -     Verify informed consent, place on front of chart; Standing  -     Void on call to Cath Lab; Standing  -     Hold heparin drip; Standing  -     Clip and Prep Right Groin, Right Radial; Standing  -     Diet NPO Except for: Medication; Standing  -     Place in Outpatient; Standing    Other orders  -     Ambulate; Standing  -     0.9%  NaCl infusion  -     diphenhydrAMINE capsule 50 mg  -     aspirin chewable tablet 81 mg  -     POCT glucose;  Standing  [facility]

## 2022-10-13 ENCOUNTER — OFFICE VISIT (OUTPATIENT)
Dept: CARDIOLOGY | Facility: CLINIC | Age: 72
End: 2022-10-13
Payer: MEDICARE

## 2022-10-13 VITALS
HEIGHT: 69 IN | SYSTOLIC BLOOD PRESSURE: 141 MMHG | WEIGHT: 199.31 LBS | BODY MASS INDEX: 29.52 KG/M2 | DIASTOLIC BLOOD PRESSURE: 86 MMHG | HEART RATE: 73 BPM

## 2022-10-13 DIAGNOSIS — I25.10 CORONARY ARTERY DISEASE INVOLVING NATIVE CORONARY ARTERY OF NATIVE HEART, UNSPECIFIED WHETHER ANGINA PRESENT: ICD-10-CM

## 2022-10-13 DIAGNOSIS — B91 POLIOMYELITIS OSTEOPATHY OF LEFT LOWER LEG: Primary | ICD-10-CM

## 2022-10-13 DIAGNOSIS — R03.0 ELEVATED BLOOD PRESSURE READING WITHOUT DIAGNOSIS OF HYPERTENSION: ICD-10-CM

## 2022-10-13 DIAGNOSIS — R07.9 CHEST PAIN, UNSPECIFIED TYPE: ICD-10-CM

## 2022-10-13 DIAGNOSIS — M89.662 POLIOMYELITIS OSTEOPATHY OF LEFT LOWER LEG: Primary | ICD-10-CM

## 2022-10-13 PROCEDURE — 99214 OFFICE O/P EST MOD 30 MIN: CPT | Mod: S$GLB,,, | Performed by: INTERNAL MEDICINE

## 2022-10-13 PROCEDURE — 99499 RISK ADDL DX/OHS AUDIT: ICD-10-PCS | Mod: S$GLB,,, | Performed by: INTERNAL MEDICINE

## 2022-10-13 PROCEDURE — 1159F MED LIST DOCD IN RCRD: CPT | Mod: CPTII,S$GLB,, | Performed by: INTERNAL MEDICINE

## 2022-10-13 PROCEDURE — 3288F PR FALLS RISK ASSESSMENT DOCUMENTED: ICD-10-PCS | Mod: CPTII,S$GLB,, | Performed by: INTERNAL MEDICINE

## 2022-10-13 PROCEDURE — 3288F FALL RISK ASSESSMENT DOCD: CPT | Mod: CPTII,S$GLB,, | Performed by: INTERNAL MEDICINE

## 2022-10-13 PROCEDURE — 3079F PR MOST RECENT DIASTOLIC BLOOD PRESSURE 80-89 MM HG: ICD-10-PCS | Mod: CPTII,S$GLB,, | Performed by: INTERNAL MEDICINE

## 2022-10-13 PROCEDURE — 99499 UNLISTED E&M SERVICE: CPT | Mod: S$GLB,,, | Performed by: INTERNAL MEDICINE

## 2022-10-13 PROCEDURE — 3079F DIAST BP 80-89 MM HG: CPT | Mod: CPTII,S$GLB,, | Performed by: INTERNAL MEDICINE

## 2022-10-13 PROCEDURE — 99999 PR PBB SHADOW E&M-EST. PATIENT-LVL III: CPT | Mod: PBBFAC,,, | Performed by: INTERNAL MEDICINE

## 2022-10-13 PROCEDURE — 1157F PR ADVANCE CARE PLAN OR EQUIV PRESENT IN MEDICAL RECORD: ICD-10-PCS | Mod: CPTII,S$GLB,, | Performed by: INTERNAL MEDICINE

## 2022-10-13 PROCEDURE — 1157F ADVNC CARE PLAN IN RCRD: CPT | Mod: CPTII,S$GLB,, | Performed by: INTERNAL MEDICINE

## 2022-10-13 PROCEDURE — 1159F PR MEDICATION LIST DOCUMENTED IN MEDICAL RECORD: ICD-10-PCS | Mod: CPTII,S$GLB,, | Performed by: INTERNAL MEDICINE

## 2022-10-13 PROCEDURE — 1101F PT FALLS ASSESS-DOCD LE1/YR: CPT | Mod: CPTII,S$GLB,, | Performed by: INTERNAL MEDICINE

## 2022-10-13 PROCEDURE — 1101F PR PT FALLS ASSESS DOC 0-1 FALLS W/OUT INJ PAST YR: ICD-10-PCS | Mod: CPTII,S$GLB,, | Performed by: INTERNAL MEDICINE

## 2022-10-13 PROCEDURE — 3077F PR MOST RECENT SYSTOLIC BLOOD PRESSURE >= 140 MM HG: ICD-10-PCS | Mod: CPTII,S$GLB,, | Performed by: INTERNAL MEDICINE

## 2022-10-13 PROCEDURE — 99214 PR OFFICE/OUTPT VISIT, EST, LEVL IV, 30-39 MIN: ICD-10-PCS | Mod: S$GLB,,, | Performed by: INTERNAL MEDICINE

## 2022-10-13 PROCEDURE — 99999 PR PBB SHADOW E&M-EST. PATIENT-LVL III: ICD-10-PCS | Mod: PBBFAC,,, | Performed by: INTERNAL MEDICINE

## 2022-10-13 PROCEDURE — 3077F SYST BP >= 140 MM HG: CPT | Mod: CPTII,S$GLB,, | Performed by: INTERNAL MEDICINE

## 2022-10-13 NOTE — PROGRESS NOTES
Subjective:   Patient ID:  Olivier Cuenca is a 71 y.o. male who presents for evaluation of Hospital Follow Up      HPI    10/13/2023  Comes in for follow-up after his heart catheterization.  Nonobstructive disease in his mid LAD bridging.    Denies any worsening angina.    No dyspnea.    No access site issues.  Right radial access site normal.          08/2022  72 yo male , 20 PQY, quit 37 years ago ,   Comes in for evaluation of chest pain, started about 3 weeks ago, was having sharp, left sided chest pain, parasternal mild right and left sided pains, seconds lasting , not with exertion   States when he is wore up and busy , no chest pain   He also uses crutches at home.  Not tender on exam.  No dyspnea on exertion.      Past Medical History:   Diagnosis Date    BPH (benign prostatic hyperplasia)     Colon polyp     Erectile dysfunction     Ex-smoker     quit 1980s    Fatty liver     Herpes simplex     History of poliomyelitis     Migraine     Thalassemia minor        Past Surgical History:   Procedure Laterality Date    APPENDECTOMY      CHOLECYSTECTOMY      COLONOSCOPY N/A 2/28/2018    Procedure: COLONOSCOPY;  Surgeon: Bo Moreno MD;  Location: [facility];  Service: Endoscopy;  Laterality: N/A;  Pt requesting no anesthesia - no sedation.  Will not have .    COLONOSCOPY N/A 6/10/2021    Procedure: COLONOSCOPY;  Surgeon: Juancho Spicer MD;  Location: [facility];  Service: Endoscopy;  Laterality: N/A;    CORONARY ANGIOGRAPHY N/A 10/7/2022    Procedure: ANGIOGRAM, CORONARY ARTERY;  Surgeon: Pelon Lombardo MD;  Location: [facility] CATH LAB;  Service: Cardiology;  Laterality: N/A;    FEMUR FRACTURE SURGERY  02/2020    after fall from [place]    HERNIA REPAIR      LEFT HEART CATHETERIZATION Left 10/7/2022    Procedure: CATHETERIZATION, HEART, LEFT;  Surgeon: Pelon Lombardo MD;  Location: [facility] CATH LAB;  Service: Cardiology;  Laterality: Left;       Social History     Tobacco Use    Smoking status:  Former     Types: Cigarettes     Start date: 3/13/1964     Quit date: 3/13/1985     Years since quittin.6    Smokeless tobacco: Never   Substance Use Topics    Alcohol use: No    Drug use: Yes     Types: Marijuana       Family History   Problem Relation Age of Onset    Dementia Mother     Hyperlipidemia Father     Melanoma Neg Hx        Review of Systems   Constitutional: Negative for fever and malaise/fatigue.   HENT:  Negative for sore throat.    Eyes:  Negative for blurred vision.   Cardiovascular:  Positive for chest pain. Negative for claudication, cyanosis, dyspnea on exertion, irregular heartbeat, leg swelling, near-syncope, orthopnea, palpitations, paroxysmal nocturnal dyspnea and syncope.   Respiratory:  Negative for cough and hemoptysis.    Hematologic/Lymphatic: Negative for bleeding problem.   Skin:  Negative for rash.   Musculoskeletal:  Negative for falls.   Gastrointestinal:  Negative for abdominal pain.   Genitourinary: Negative.    Neurological: Negative.    Psychiatric/Behavioral:  Negative for altered mental status and substance abuse.      Current Outpatient Medications on File Prior to Visit   Medication Sig    aspirin 81 MG Chew Take 81 mg by mouth once daily.    tadalafiL (CIALIS) 5 MG tablet Take 1 tablet (5 mg total) by mouth once daily. **May Cause Dizziness** *DO NOT TAKE WITH NITRATE *    valACYclovir (VALTREX) 1000 MG tablet 2 at onset cold sore, repeat 2 in 12 hours (Patient not taking: No sig reported)     No current facility-administered medications on file prior to visit.       Objective:   Objective:  Wt Readings from Last 3 Encounters:   10/13/22 90.4 kg (199 lb 4.7 oz)   10/07/22 89.4 kg (197 lb)   22 89.4 kg (197 lb 1.5 oz)     Temp Readings from Last 3 Encounters:   10/07/22 97.6 °F (36.4 °C) (Temporal)   22 97.7 °F (36.5 °C) (Tympanic)   06/10/21 97.8 °F (36.6 °C) (Tympanic)     BP Readings from Last 3 Encounters:   10/13/22 (!) 141/86   10/07/22 115/67    08/08/22 130/84     Pulse Readings from Last 3 Encounters:   10/13/22 73   10/07/22 63   08/08/22 98       Physical Exam  Vitals reviewed.   Constitutional:       Appearance: He is well-developed.   HENT:      Head: Normocephalic and atraumatic.   Eyes:      General: No scleral icterus.     Conjunctiva/sclera: Conjunctivae normal.   Cardiovascular:      Rate and Rhythm: Normal rate and regular rhythm.      Pulses: Intact distal pulses.      Heart sounds: Normal heart sounds. No murmur heard.  Pulmonary:      Effort: No respiratory distress.      Breath sounds: No wheezing or rales.   Chest:      Chest wall: No tenderness.   Abdominal:      General: Bowel sounds are normal. There is no distension.      Palpations: Abdomen is soft.      Tenderness: There is no guarding.   Musculoskeletal:         General: Normal range of motion.      Cervical back: Normal range of motion and neck supple.   Skin:     General: Skin is warm.   Neurological:      Mental Status: He is alert and oriented to person, place, and time.       Lab Results   Component Value Date    CHOL 125 07/15/2022    CHOL 127 05/06/2021    CHOL 141 11/06/2019     Lab Results   Component Value Date    HDL 36 (L) 07/15/2022    HDL 33 (L) 05/06/2021    HDL 49 11/06/2019     Lab Results   Component Value Date    LDLCALC 64.4 07/15/2022    LDLCALC 69.2 05/06/2021    LDLCALC 77.2 11/06/2019     Lab Results   Component Value Date    TRIG 123 07/15/2022    TRIG 124 05/06/2021    TRIG 74 11/06/2019     Lab Results   Component Value Date    CHOLHDL 28.8 07/15/2022    CHOLHDL 26.0 05/06/2021    CHOLHDL 34.8 11/06/2019       Chemistry        Component Value Date/Time     10/05/2022 0932    K 4.1 10/05/2022 0932     10/05/2022 0932    CO2 19 (L) 10/05/2022 0932    BUN 17 10/05/2022 0932    CREATININE 0.8 10/05/2022 0932    GLU 89 10/05/2022 0932        Component Value Date/Time    CALCIUM 9.5 10/05/2022 0932    ALKPHOS 103 07/15/2022 0827    AST 21 07/15/2022  0827    ALT 26 07/15/2022 0827    BILITOT 0.7 07/15/2022 0827    ESTGFRAFRICA >60.0 07/15/2022 0827    EGFRNONAA >60.0 07/15/2022 0827          Lab Results   Component Value Date    TSH 2.39 08/04/2009     Lab Results   Component Value Date    INR 1.0 10/05/2022     Lab Results   Component Value Date    WBC 6.31 10/05/2022    HGB 16.0 10/05/2022    HCT 48.0 10/05/2022    MCV 92 10/05/2022     10/05/2022     BNP  @LABRCNTIP(BNP,BNPTRIAGEBLO)@  CrCl cannot be calculated (Patient's most recent lab result is older than the maximum 7 days allowed.).     Imaging:  ======  No results found for this or any previous visit.    No results found for this or any previous visit.    No results found for this or any previous visit.    Results for orders placed during the hospital encounter of 07/27/22    X-Ray Chest PA And Lateral    Narrative  EXAMINATION:  XR CHEST PA AND LATERAL    CLINICAL HISTORY:  Chest pain, unspecified    TECHNIQUE:  PA and lateral views of the chest were performed.    COMPARISON:  Prior radiographs    FINDINGS:  Cardiac silhouette and mediastinal contours are normal.  Lungs are clear.  Osseous structures are intact.    Impression  No acute cardiopulmonary process.      Electronically signed by: Yuri Bran MD  Date:    07/27/2022  Time:    11:43    No results found for this or any previous visit.    No valid procedures specified.    Results for orders placed during the hospital encounter of 10/07/22    Cardiac catheterization    Conclusion    The pre-procedure left ventricular end diastolic pressure was 8.    The estimated blood loss was none.    There was non-obstructive coronary artery disease.    Mid LAD bridging    The procedure log was documented by Documenter: Cely Antonio RN and verified by Pelon Lombardo MD.    Date: 10/7/2022  Time: 11:28 AM    Diagnostic Results:  ECG: Reviewed  Sinus rhythm.  Normal ECG.  The ASCVD Risk score ([place] ANGELO, et al., 2019) failed to calculate for  the following reasons:    The valid total cholesterol range is 130 to 320 mg/dL    Assessment and Plan:   Poliomyelitis osteopathy of left lower leg    Chest pain, unspecified type    Elevated blood pressure reading without diagnosis of hypertension    Coronary artery disease involving native coronary artery of native heart, unspecified whether angina present    Mid LAD bridging.  Discussed beta-blockers.  Patient declined for now.    LDL 65.  Given CAD recommended low-dose of pravastatin however also declined for now he will continue to monitor.    Reviewed the coronary angiogram films with the patient.  Reviewed all tests and above medical conditions with patient in detail and formulated treatment plan.  Risk factor modification discussed.   Cardiac low salt diet discussed.  Maintaining healthy weight and weight loss goals were discussed in clinic.    Follow up in 6 months

## 2022-11-07 ENCOUNTER — PES CALL (OUTPATIENT)
Dept: ADMINISTRATIVE | Facility: OTHER | Age: 72
End: 2022-11-07
Payer: MEDICARE

## 2022-11-21 ENCOUNTER — OFFICE VISIT (OUTPATIENT)
Dept: INTERNAL MEDICINE | Facility: CLINIC | Age: 72
End: 2022-11-21
Payer: MEDICARE

## 2022-11-21 ENCOUNTER — TELEPHONE (OUTPATIENT)
Dept: PAIN MEDICINE | Facility: CLINIC | Age: 72
End: 2022-11-21
Payer: MEDICARE

## 2022-11-21 VITALS
WEIGHT: 199.5 LBS | BODY MASS INDEX: 29.55 KG/M2 | HEIGHT: 69 IN | OXYGEN SATURATION: 99 % | SYSTOLIC BLOOD PRESSURE: 134 MMHG | HEART RATE: 72 BPM | DIASTOLIC BLOOD PRESSURE: 88 MMHG | TEMPERATURE: 97 F

## 2022-11-21 DIAGNOSIS — K76.0 FATTY LIVER: ICD-10-CM

## 2022-11-21 DIAGNOSIS — G57.01 SCIATIC NERVE DISEASE, RIGHT: ICD-10-CM

## 2022-11-21 DIAGNOSIS — K21.9 GASTROESOPHAGEAL REFLUX DISEASE, UNSPECIFIED WHETHER ESOPHAGITIS PRESENT: Primary | ICD-10-CM

## 2022-11-21 DIAGNOSIS — I25.10 CORONARY ARTERY DISEASE, UNSPECIFIED VESSEL OR LESION TYPE, UNSPECIFIED WHETHER ANGINA PRESENT, UNSPECIFIED WHETHER NATIVE OR TRANSPLANTED HEART: ICD-10-CM

## 2022-11-21 DIAGNOSIS — Z12.5 SCREENING FOR PROSTATE CANCER: ICD-10-CM

## 2022-11-21 DIAGNOSIS — N40.0 BENIGN PROSTATIC HYPERPLASIA, UNSPECIFIED WHETHER LOWER URINARY TRACT SYMPTOMS PRESENT: ICD-10-CM

## 2022-11-21 PROCEDURE — 1157F ADVNC CARE PLAN IN RCRD: CPT | Mod: CPTII,S$GLB,, | Performed by: FAMILY MEDICINE

## 2022-11-21 PROCEDURE — G0008 ADMIN INFLUENZA VIRUS VAC: HCPCS | Mod: S$GLB,,, | Performed by: FAMILY MEDICINE

## 2022-11-21 PROCEDURE — 99999 PR PBB SHADOW E&M-EST. PATIENT-LVL III: ICD-10-PCS | Mod: PBBFAC,,, | Performed by: FAMILY MEDICINE

## 2022-11-21 PROCEDURE — 3079F PR MOST RECENT DIASTOLIC BLOOD PRESSURE 80-89 MM HG: ICD-10-PCS | Mod: CPTII,S$GLB,, | Performed by: FAMILY MEDICINE

## 2022-11-21 PROCEDURE — 99214 OFFICE O/P EST MOD 30 MIN: CPT | Mod: S$GLB,,, | Performed by: FAMILY MEDICINE

## 2022-11-21 PROCEDURE — 1101F PT FALLS ASSESS-DOCD LE1/YR: CPT | Mod: CPTII,S$GLB,, | Performed by: FAMILY MEDICINE

## 2022-11-21 PROCEDURE — 3075F SYST BP GE 130 - 139MM HG: CPT | Mod: CPTII,S$GLB,, | Performed by: FAMILY MEDICINE

## 2022-11-21 PROCEDURE — 90694 FLU VACCINE - QUADRIVALENT - ADJUVANTED: ICD-10-PCS | Mod: S$GLB,,, | Performed by: FAMILY MEDICINE

## 2022-11-21 PROCEDURE — 99499 UNLISTED E&M SERVICE: CPT | Mod: S$GLB,,, | Performed by: FAMILY MEDICINE

## 2022-11-21 PROCEDURE — 99214 PR OFFICE/OUTPT VISIT, EST, LEVL IV, 30-39 MIN: ICD-10-PCS | Mod: S$GLB,,, | Performed by: FAMILY MEDICINE

## 2022-11-21 PROCEDURE — G0008 FLU VACCINE - QUADRIVALENT - ADJUVANTED: ICD-10-PCS | Mod: S$GLB,,, | Performed by: FAMILY MEDICINE

## 2022-11-21 PROCEDURE — 3079F DIAST BP 80-89 MM HG: CPT | Mod: CPTII,S$GLB,, | Performed by: FAMILY MEDICINE

## 2022-11-21 PROCEDURE — 99999 PR PBB SHADOW E&M-EST. PATIENT-LVL III: CPT | Mod: PBBFAC,,, | Performed by: FAMILY MEDICINE

## 2022-11-21 PROCEDURE — 3008F BODY MASS INDEX DOCD: CPT | Mod: CPTII,S$GLB,, | Performed by: FAMILY MEDICINE

## 2022-11-21 PROCEDURE — 1126F AMNT PAIN NOTED NONE PRSNT: CPT | Mod: CPTII,S$GLB,, | Performed by: FAMILY MEDICINE

## 2022-11-21 PROCEDURE — 90694 VACC AIIV4 NO PRSRV 0.5ML IM: CPT | Mod: S$GLB,,, | Performed by: FAMILY MEDICINE

## 2022-11-21 PROCEDURE — 3008F PR BODY MASS INDEX (BMI) DOCUMENTED: ICD-10-PCS | Mod: CPTII,S$GLB,, | Performed by: FAMILY MEDICINE

## 2022-11-21 PROCEDURE — 1157F PR ADVANCE CARE PLAN OR EQUIV PRESENT IN MEDICAL RECORD: ICD-10-PCS | Mod: CPTII,S$GLB,, | Performed by: FAMILY MEDICINE

## 2022-11-21 PROCEDURE — 3288F FALL RISK ASSESSMENT DOCD: CPT | Mod: CPTII,S$GLB,, | Performed by: FAMILY MEDICINE

## 2022-11-21 PROCEDURE — 3288F PR FALLS RISK ASSESSMENT DOCUMENTED: ICD-10-PCS | Mod: CPTII,S$GLB,, | Performed by: FAMILY MEDICINE

## 2022-11-21 PROCEDURE — 1126F PR PAIN SEVERITY QUANTIFIED, NO PAIN PRESENT: ICD-10-PCS | Mod: CPTII,S$GLB,, | Performed by: FAMILY MEDICINE

## 2022-11-21 PROCEDURE — 99499 RISK ADDL DX/OHS AUDIT: ICD-10-PCS | Mod: S$GLB,,, | Performed by: FAMILY MEDICINE

## 2022-11-21 PROCEDURE — 3075F PR MOST RECENT SYSTOLIC BLOOD PRESS GE 130-139MM HG: ICD-10-PCS | Mod: CPTII,S$GLB,, | Performed by: FAMILY MEDICINE

## 2022-11-21 PROCEDURE — 1101F PR PT FALLS ASSESS DOC 0-1 FALLS W/OUT INJ PAST YR: ICD-10-PCS | Mod: CPTII,S$GLB,, | Performed by: FAMILY MEDICINE

## 2022-11-21 RX ORDER — PRAVASTATIN SODIUM 20 MG/1
20 TABLET ORAL DAILY
Qty: 90 TABLET | Refills: 3 | Status: SHIPPED | OUTPATIENT
Start: 2022-11-21 | End: 2022-11-22

## 2022-11-21 RX ORDER — TADALAFIL 5 MG/1
TABLET ORAL
Qty: 30 TABLET | Refills: 11 | Status: SHIPPED | OUTPATIENT
Start: 2022-11-21 | End: 2023-11-22 | Stop reason: SDUPTHER

## 2022-11-21 NOTE — TELEPHONE ENCOUNTER
Reached out to patient from [facility]. Patient is NOT interested in surgical consultation, more interested in [facility]. Explained to patient the difference in the two departments ([facility] and [facility]) and that he did in fact have a [facility] referral. Appointment made with [facility] in [place] where patient lives. Patient v/u.    Naz Sexton RN

## 2022-11-22 ENCOUNTER — OFFICE VISIT (OUTPATIENT)
Dept: PAIN MEDICINE | Facility: CLINIC | Age: 72
End: 2022-11-22
Payer: MEDICARE

## 2022-11-22 VITALS
SYSTOLIC BLOOD PRESSURE: 143 MMHG | HEIGHT: 69 IN | BODY MASS INDEX: 29.48 KG/M2 | WEIGHT: 199.06 LBS | DIASTOLIC BLOOD PRESSURE: 80 MMHG | HEART RATE: 80 BPM

## 2022-11-22 DIAGNOSIS — M54.16 LUMBAR RADICULOPATHY: Primary | ICD-10-CM

## 2022-11-22 DIAGNOSIS — M51.36 DDD (DEGENERATIVE DISC DISEASE), LUMBAR: ICD-10-CM

## 2022-11-22 DIAGNOSIS — M47.816 LUMBAR SPONDYLOSIS: ICD-10-CM

## 2022-11-22 DIAGNOSIS — G57.01 SCIATIC NERVE DISEASE, RIGHT: ICD-10-CM

## 2022-11-22 DIAGNOSIS — Z96.641 H/O TOTAL HIP ARTHROPLASTY, RIGHT: ICD-10-CM

## 2022-11-22 DIAGNOSIS — B91 POLIOMYELITIS OSTEOPATHY OF LEFT LOWER LEG: ICD-10-CM

## 2022-11-22 DIAGNOSIS — M89.662 POLIOMYELITIS OSTEOPATHY OF LEFT LOWER LEG: ICD-10-CM

## 2022-11-22 PROCEDURE — 1159F PR MEDICATION LIST DOCUMENTED IN MEDICAL RECORD: ICD-10-PCS | Mod: CPTII,S$GLB,, | Performed by: ANESTHESIOLOGY

## 2022-11-22 PROCEDURE — 1126F PR PAIN SEVERITY QUANTIFIED, NO PAIN PRESENT: ICD-10-PCS | Mod: CPTII,S$GLB,, | Performed by: ANESTHESIOLOGY

## 2022-11-22 PROCEDURE — 99203 OFFICE O/P NEW LOW 30 MIN: CPT | Mod: S$GLB,,, | Performed by: ANESTHESIOLOGY

## 2022-11-22 PROCEDURE — 1157F ADVNC CARE PLAN IN RCRD: CPT | Mod: CPTII,S$GLB,, | Performed by: ANESTHESIOLOGY

## 2022-11-22 PROCEDURE — 3288F PR FALLS RISK ASSESSMENT DOCUMENTED: ICD-10-PCS | Mod: CPTII,S$GLB,, | Performed by: ANESTHESIOLOGY

## 2022-11-22 PROCEDURE — 1159F MED LIST DOCD IN RCRD: CPT | Mod: CPTII,S$GLB,, | Performed by: ANESTHESIOLOGY

## 2022-11-22 PROCEDURE — 99999 PR PBB SHADOW E&M-EST. PATIENT-LVL III: CPT | Mod: PBBFAC,,, | Performed by: ANESTHESIOLOGY

## 2022-11-22 PROCEDURE — 1101F PR PT FALLS ASSESS DOC 0-1 FALLS W/OUT INJ PAST YR: ICD-10-PCS | Mod: CPTII,S$GLB,, | Performed by: ANESTHESIOLOGY

## 2022-11-22 PROCEDURE — 1101F PT FALLS ASSESS-DOCD LE1/YR: CPT | Mod: CPTII,S$GLB,, | Performed by: ANESTHESIOLOGY

## 2022-11-22 PROCEDURE — 3008F PR BODY MASS INDEX (BMI) DOCUMENTED: ICD-10-PCS | Mod: CPTII,S$GLB,, | Performed by: ANESTHESIOLOGY

## 2022-11-22 PROCEDURE — 3079F PR MOST RECENT DIASTOLIC BLOOD PRESSURE 80-89 MM HG: ICD-10-PCS | Mod: CPTII,S$GLB,, | Performed by: ANESTHESIOLOGY

## 2022-11-22 PROCEDURE — 3077F PR MOST RECENT SYSTOLIC BLOOD PRESSURE >= 140 MM HG: ICD-10-PCS | Mod: CPTII,S$GLB,, | Performed by: ANESTHESIOLOGY

## 2022-11-22 PROCEDURE — 3288F FALL RISK ASSESSMENT DOCD: CPT | Mod: CPTII,S$GLB,, | Performed by: ANESTHESIOLOGY

## 2022-11-22 PROCEDURE — 1157F PR ADVANCE CARE PLAN OR EQUIV PRESENT IN MEDICAL RECORD: ICD-10-PCS | Mod: CPTII,S$GLB,, | Performed by: ANESTHESIOLOGY

## 2022-11-22 PROCEDURE — 1126F AMNT PAIN NOTED NONE PRSNT: CPT | Mod: CPTII,S$GLB,, | Performed by: ANESTHESIOLOGY

## 2022-11-22 PROCEDURE — 3077F SYST BP >= 140 MM HG: CPT | Mod: CPTII,S$GLB,, | Performed by: ANESTHESIOLOGY

## 2022-11-22 PROCEDURE — 99999 PR PBB SHADOW E&M-EST. PATIENT-LVL III: ICD-10-PCS | Mod: PBBFAC,,, | Performed by: ANESTHESIOLOGY

## 2022-11-22 PROCEDURE — 99203 PR OFFICE/OUTPT VISIT, NEW, LEVL III, 30-44 MIN: ICD-10-PCS | Mod: S$GLB,,, | Performed by: ANESTHESIOLOGY

## 2022-11-22 PROCEDURE — 3079F DIAST BP 80-89 MM HG: CPT | Mod: CPTII,S$GLB,, | Performed by: ANESTHESIOLOGY

## 2022-11-22 PROCEDURE — 3008F BODY MASS INDEX DOCD: CPT | Mod: CPTII,S$GLB,, | Performed by: ANESTHESIOLOGY

## 2022-11-22 NOTE — PROGRESS NOTES
New Patient Interventional Pain Note (Initial Visit)    Referring Physician: Gucci Giang MD    PCP: Gucci Giang MD    Chief Complaint:   Chief Complaint   Patient presents with    Leg Pain     Right leg (weakness)        SUBJECTIVE:    Olivier Cuenca is a 71 y.o. male who presents to the clinic for the evaluation of right lower extremity weakness pain.   Patient reports 3 year history of right lower extremity weakness.  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  Patient reports approximately 2 years ago he would a fall from standing injuring his right hip and subsequently underwent right FILEMON.  Patient primarily notices right lower extremity weakness when climbing stairs.  Does report associated back pain with occasional radiation to his right lower extremity with significant walking and lifting, relieved with sitting down and rest.  Today pain is rated a 2 out 10, but can increase to a 7/10 with activity. Denies any fevers, chills, changes in gait, weakness, or bowel and bladder incontinence        Non-Pharmacologic Treatments:  Physical Therapy/Home Exercise: yes  Ice/Heat:yes  TENS: no  Acupuncture: no  Massage: yes  Chiropractic: no        Previous Pain Medications:  NSAIDs, Tylenol, topicals     report:  Reviewed and consistent with medication use as prescribed.    Pain Procedures:   Previous history of lumbar epidural steroid injections with significant relief      Imaging:   MRI Lumbar Spine, 5/9/22, [facility] imaging [place]    Results for orders placed during the hospital encounter of 09/19/16    X-Ray Cervical Spine AP And Lateral    Narrative  Findings: AP, lateral, and odontoid views.    There is generalized osteopenia.  Degenerative joint space narrowing, marginal spurring, and mild grade 1 retrolisthesis at C5-C6.  Similar disc space narrowing at  C6-C7.  Odontoid is intact.  No fracture.  IMPRESSION:  As above.  ______________________________________    Electronically signed by: EULALIA STARK MD  Date:     16  Time:    15:53        Past Medical History:   Diagnosis Date    BPH (benign prostatic hyperplasia)     CAD (coronary artery disease)     nonobs    Colon polyp     Erectile dysfunction     Ex-smoker     quit     Fatty liver     Herpes simplex     History of poliomyelitis     Migraine     Thalassemia minor      Past Surgical History:   Procedure Laterality Date    APPENDECTOMY      CHOLECYSTECTOMY      COLONOSCOPY N/A 2018    Procedure: COLONOSCOPY;  Surgeon: Bo Moreno MD;  Location: [facility] ENDO;  Service: Endoscopy;  Laterality: N/A;  Pt requesting no anesthesia - no sedation.  Will not have .    COLONOSCOPY N/A 6/10/2021    Procedure: COLONOSCOPY;  Surgeon: Juancho Spicer MD;  Location: [facility] ENDO;  Service: Endoscopy;  Laterality: N/A;    CORONARY ANGIOGRAPHY N/A 10/7/2022    Procedure: ANGIOGRAM, CORONARY ARTERY;  Surgeon: Pelon Lombardo MD;  Location: [facility] CATH LAB;  Service: Cardiology;  Laterality: N/A;    FEMUR FRACTURE SURGERY  2020    after fall from deer stand    HERNIA REPAIR      LEFT HEART CATHETERIZATION Left 10/7/2022    Procedure: CATHETERIZATION, HEART, LEFT;  Surgeon: Pelon Lombardo MD;  Location: [facility] CATH LAB;  Service: Cardiology;  Laterality: Left;     Social History     Socioeconomic History    Marital status: Single    Number of children: 2   Occupational History    Occupation:      Employer: [employer]     Comment: [employer]   Tobacco Use    Smoking status: Former     Types: Cigarettes     Start date: 3/13/1964     Quit date: 3/13/1985     Years since quittin.7    Smokeless tobacco: Never   Substance and Sexual Activity    Alcohol use: No    Drug use: Yes     Types: Marijuana     Family History   Problem Relation Age of Onset     "Dementia Mother     Hyperlipidemia Father     Melanoma Neg Hx        Review of patient's allergies indicates:   Allergen Reactions    Codeine      Other reaction(s): Stomach upset    Codeine sulfate        Current Outpatient Medications   Medication Sig    tadalafiL (CIALIS) 5 MG tablet Take 1 tablet (5 mg total) by mouth once daily. **May Cause Dizziness** *DO NOT TAKE WITH NITRATE *    valACYclovir (VALTREX) 1000 MG tablet 2 at onset cold sore, repeat 2 in 12 hours    aspirin 81 MG Chew Take 81 mg by mouth once daily.    pravastatin (PRAVACHOL) 20 MG tablet Take 1 tablet (20 mg total) by mouth once daily.     No current facility-administered medications for this visit.         ROS  Review of Systems   Constitutional:  Negative for activity change, appetite change and fever.   HENT:  Negative for facial swelling, rhinorrhea and sore throat.    Eyes:  Negative for pain and redness.   Respiratory:  Negative for cough, chest tightness, shortness of breath, wheezing and stridor.    Cardiovascular:  Negative for chest pain, palpitations and leg swelling.   Gastrointestinal:  Negative for abdominal pain, blood in stool, constipation, diarrhea, nausea and vomiting.   Endocrine: Negative for polydipsia, polyphagia and polyuria.   Genitourinary:  Negative for dysuria, hematuria and urgency.   Musculoskeletal:  Positive for back pain, gait problem and myalgias. Negative for arthralgias, joint swelling, neck pain and neck stiffness.   Skin:  Negative for rash.   Allergic/Immunologic: Negative for food allergies.   Neurological:  Positive for weakness. Negative for dizziness, tremors, seizures, syncope, facial asymmetry, speech difficulty, light-headedness, numbness and headaches.   Psychiatric/Behavioral:  Negative for agitation, hallucinations, self-injury and suicidal ideas. The patient is not nervous/anxious and is not hyperactive.           OBJECTIVE:  BP (!) 143/80   Pulse 80   Ht 5' 9" (1.753 m)   Wt 90.3 kg (199 lb " 1.2 oz)   BMI 29.40 kg/m²         Physical Exam  Constitutional:       General: He is not in acute distress.     Appearance: Normal appearance. He is not ill-appearing.   HENT:      Head: Normocephalic and atraumatic.      Nose: No congestion or rhinorrhea.      Mouth/Throat:      Mouth: Mucous membranes are moist.   Eyes:      Extraocular Movements: Extraocular movements intact.      Pupils: Pupils are equal, round, and reactive to light.   Cardiovascular:      Pulses: Normal pulses.   Pulmonary:      Effort: Pulmonary effort is normal.   Abdominal:      General: Abdomen is flat.      Palpations: Abdomen is soft.   Musculoskeletal:      Cervical back: Normal range of motion and neck supple.   Skin:     General: Skin is warm and dry.      Capillary Refill: Capillary refill takes less than 2 seconds.   Neurological:      Mental Status: He is alert and oriented to person, place, and time.      Cranial Nerves: No cranial nerve deficit.      Sensory: No sensory deficit.      Motor: Weakness present. No abnormal muscle tone.      Gait: Gait abnormal.      Deep Tendon Reflexes: Babinski sign absent on the right side. Babinski sign absent on the left side.      Reflex Scores:       Patellar reflexes are 1+ on the right side and 1+ on the left side.       Achilles reflexes are 1+ on the right side and 1+ on the left side.     Comments: Antalgic gait, uses a cane for walking  Left lower extremity diffusely weak secondary to post-polio syndrome.  4/5 strength in right knee extension and knee flexion.  5/5 strength in other muscle groups of right lower extremity   Psychiatric:         Mood and Affect: Mood normal.         Behavior: Behavior normal.         Thought Content: Thought content normal.         Musculoskeletal:        Lumbar Exam  Incision: no  Pain with Flexion: no  Pain with Extension: yes  ROM: FROM  Paraspinous TTP:  Right-greater-than-left  Facet TTP:  L4-L5  Facet Loading:  Negative bilaterally  SLR:  Positive  at 70° in L4 distribution  SIJ TTP:  Negative bilaterally      LABS:  Lab Results   Component Value Date    WBC 6.31 10/05/2022    HGB 16.0 10/05/2022    HCT 48.0 10/05/2022    MCV 92 10/05/2022     10/05/2022       CMP  Sodium   Date Value Ref Range Status   10/05/2022 138 136 - 145 mmol/L Final     Potassium   Date Value Ref Range Status   10/05/2022 4.1 3.5 - 5.1 mmol/L Final     Chloride   Date Value Ref Range Status   10/05/2022 108 95 - 110 mmol/L Final     CO2   Date Value Ref Range Status   10/05/2022 19 (L) 23 - 29 mmol/L Final     Glucose   Date Value Ref Range Status   10/05/2022 89 70 - 110 mg/dL Final     BUN   Date Value Ref Range Status   10/05/2022 17 8 - 23 mg/dL Final     Creatinine   Date Value Ref Range Status   10/05/2022 0.8 0.5 - 1.4 mg/dL Final     Calcium   Date Value Ref Range Status   10/05/2022 9.5 8.7 - 10.5 mg/dL Final     Total Protein   Date Value Ref Range Status   07/15/2022 6.7 6.0 - 8.4 g/dL Final     Albumin   Date Value Ref Range Status   07/15/2022 3.9 3.5 - 5.2 g/dL Final     Total Bilirubin   Date Value Ref Range Status   07/15/2022 0.7 0.1 - 1.0 mg/dL Final     Comment:     For infants and newborns, interpretation of results should be based  on gestational age, weight and in agreement with clinical  observations.    Premature Infant recommended reference ranges:  Up to 24 hours.............<8.0 mg/dL  Up to 48 hours............<12.0 mg/dL  3-5 days..................<15.0 mg/dL  6-29 days.................<15.0 mg/dL       Alkaline Phosphatase   Date Value Ref Range Status   07/15/2022 103 55 - 135 U/L Final     AST   Date Value Ref Range Status   07/15/2022 21 10 - 40 U/L Final     ALT   Date Value Ref Range Status   07/15/2022 26 10 - 44 U/L Final     Anion Gap   Date Value Ref Range Status   10/05/2022 11 8 - 16 mmol/L Final     eGFR if    Date Value Ref Range Status   07/15/2022 >60.0 >60 mL/min/1.73 m^2 Final     eGFR if non    Date  Value Ref Range Status   07/15/2022 >60.0 >60 mL/min/1.73 m^2 Final     Comment:     Calculation used to obtain the estimated glomerular filtration  rate (eGFR) is the CKD-EPI equation.          Lab Results   Component Value Date    HGBA1C 5.0 08/27/2004             ASSESSMENT:       71 y.o. year old male with right lower extremity weakness pain, consistent with     1. Lumbar radiculopathy        2. Sciatic nerve disease, right  Ambulatory referral/consult to Neurosurgery      3. DDD (degenerative disc disease), lumbar        4. Lumbar spondylosis        5. Poliomyelitis osteopathy of left lower leg        6. H/O total hip arthroplasty, right          Lumbar radiculopathy    Sciatic nerve disease, right  -     Ambulatory referral/consult to Neurosurgery    DDD (degenerative disc disease), lumbar    Lumbar spondylosis    Poliomyelitis osteopathy of left lower leg    H/O total hip arthroplasty, right           PLAN:   - Interventions:   Discuss possible bilateral L4-5 transforaminal epidural steroid injection for lumbar radiculopathy.  Patient defers at this time, but will call if he is interested in scheduling procedure.  - Anticoagulation use:   no no anticoagulation    - Medications:   None at this time    - Therapy:    Patient has completed formal physical therapy.  Continue home exercises    - Imaging/Diagnostic:   MRI lumbar spine reviewed and findings discussed with patient.  Significant for subligamentous disc bulge at L4-5 resulting in anterior thecal sac compression causing bilateral lateral recess and foraminal stenosis.    - Consults:   Continue follow-up with orthopedics for history of previous right FILEMON        - Patient Questions: Answered all of the patient's questions regarding diagnosis, therapy, and treatment    - Follow up visit: return to clinic PRN        The above plan and management options were discussed at length with patient. Patient is in agreement with the above and verbalized  understanding.    I discussed the goals of interventional chronic pain management with the patient on today's visit.  I explained the utility of injections for diagnostic and therapeutic purposes.  We discussed a multimodal approach to pain including treating the patient's given worst pain at any given time.  We will use a systematic approach to addressing pain.  We will also adopt a multimodal approach that includes injections, adjuvant medications, physical therapy, at times psychiatry.  There may be a limited role for opioid use intermittently in the treatment of pain, more particularly for acute pain although no one approach can be used as a sole treatment modality.    I emphasized the importance of regular exercise, core strengthening and stretching, diet and weight loss as a cornerstone of long-term pain management.      Jose Ramon Moscoso MD  Interventional Pain Management  [facility]    Disclaimer:  This note was prepared using voice recognition system and is likely to have sound alike errors that may have been overlooked even after proof reading.  Please call me with any questions

## 2022-12-04 PROBLEM — I25.10 CAD (CORONARY ARTERY DISEASE): Status: ACTIVE | Noted: 2022-01-01

## 2022-12-05 NOTE — PROGRESS NOTES
Subjective:       Patient ID: Olivier Cuenca is a. male.    Chief Complaint: Multiple issues see below      HPInd f/u prob list below;  Have D/wd cv risk 10% declined statin and d/wd import of this med with newly dxd cad nonobs    s of 11/19land in [place] eventual [place].     Past Medical History:   Diagnosis Date    BPH (benign prostatic hyperplasia)     CAD (coronary artery disease) 2022    nonobs    Colon polyp     Erectile dysfunction     Ex-smoker     quit 1980s    Fatty liver     Herpes simplex     History of poliomyelitis     Migraine     Thalassemia minor      Past Surgical History:   Procedure Laterality Date    APPENDECTOMY      CHOLECYSTECTOMY      COLONOSCOPY N/A 2/28/2018    Procedure: COLONOSCOPY;  Surgeon: Bo Moreno MD;  Location: [facility] ENDO;  Service: Endoscopy;  Laterality: N/A;  Pt requesting no anesthesia - no sedation.  Will not have .    COLONOSCOPY N/A 6/10/2021    Procedure: COLONOSCOPY;  Surgeon: Juancho Spicer MD;  Location: [facility];  Service: Endoscopy;  Laterality: N/A;    CORONARY ANGIOGRAPHY N/A 10/7/2022    Procedure: ANGIOGRAM, CORONARY ARTERY;  Surgeon: Pelon Lombardo MD;  Location: [facility] CATH LAB;  Service: Cardiology;  Laterality: N/A;    FEMUR FRACTURE SURGERY  02/2020    after fall [place]    HERNIA REPAIR      LEFT HEART CATHETERIZATION Left 10/7/2022    Procedure: CATHETERIZATION, HEART, LEFT;  Surgeon: Pelon Lombardo MD;  Location: [facility] CATH LAB;  Service: Cardiology;  Laterality: Left;     Family History   Problem Relation Age of Onset    Dementia Mother     Hyperlipidemia Father     Melanoma Neg Hx      Social History     Socioeconomic History    Marital status: Single    Number of children: 2   Occupational History    Occupation:      Employer: [employer]     Comment: [employer]   Tobacco Use    Smoking status: Former     Types: Cigarettes     Start date: 3/13/1964     Quit date: 3/13/1985      Years since quittin.7    Smokeless tobacco: Never   Substance and Sexual Activity    Alcohol use: No    Drug use: Yes     Types: Marijuana       Review of Systems  Cardiovascular: no chest pain  Chest: no shortness of breath  Abd: no abd pain  Remainder review of systems negative    Objective:      Physical Exam   Constitutional: He is oriented to person, place, and time. He appears well-developed and well-nourished.   HENT:   Head: Normocephalic and atraumatic.   Right Ear: External ear normal.   Left Ear: External ear normal.   Nose: Nose normal.     Nl tms   Eyes: Pupils are equal, round, and reactive to light. Conjunctivae and EOM are normal. No scleral icterus.   Neck: Normal range of motion. Neck supple. Carotid bruit is not present.   Cardiovascular: Normal rate, regular rhythm and normal heart sounds. Exam reveals no gallop and no friction rub.   No murmur heard.  Pulmonary/Chest: Effort normal and breath sounds normal. He has no wheezes.   Abdominal: Soft. Bowel sounds are normal. He exhibits no distension and no mass. There is no hepatosplenomegaly. There is no tenderness. There is no rebound and no guarding.   Musculoskeletal: Normal range of motion. He exhibits no tenderness.   Lymphadenopathy:     He has no cervical adenopathy.   Neurological: He is alert and oriented to person, place, and time. No cranial nerve deficit. Coordination normal.   Skin: Skin is warm and dry. No rash noted. No erythema.   Psychiatric: He has a normal mood and affect. His behavior is normal. Judgment and thought content normal.   Nursing note and vitals reviewed.      Assessment:           2. Fatty liver    3. Benign prostatic hyperplasia, unspecified whether lower urinary tract symptoms present    4. Gastroesophageal reflux disease, esophagitis presence not specified      cad  Plan:       Lab Aug and follow up after  Reconsider statin strongly recommend    Gastroesophageal reflux disease, unspecified whether  esophagitis present    Fatty liver  -     Comprehensive Metabolic Panel; Future; Expected date: 11/21/2023  -     CBC Auto Differential; Future; Expected date: 11/21/2023  -     Lipid Panel; Future; Expected date: 11/21/2023    Benign prostatic hyperplasia, unspecified whether lower urinary tract symptoms present    Screening for prostate cancer  -     PSA, Screening; Future; Expected date: 11/21/2023    Sciatic nerve disease, right  -     Ambulatory referral/consult to Neurosurgery; Future; Expected date: 11/28/2022  -     Ambulatory referral/consult to Physical/Occupational Therapy; Future; Expected date: 11/28/2022    Coronary artery disease, unspecified vessel or lesion type, unspecified whether angina present, unspecified whether native or transplanted heart    Other orders  -     tadalafiL (CIALIS) 5 MG tablet; Take 1 tablet (5 mg total) by mouth once daily. **May Cause Dizziness** *DO NOT TAKE WITH NITRATE *  Dispense: 30 tablet; Refill: 11  -     Influenza - Quadrivalent (Adjuvanted)  -     Discontinue: pravastatin (PRAVACHOL) 20 MG tablet; Take 1 tablet (20 mg total) by mouth once daily.  Dispense: 90 tablet; Refill: 3

## 2023-03-17 ENCOUNTER — PATIENT MESSAGE (OUTPATIENT)
Dept: RESEARCH | Facility: HOSPITAL | Age: 73
End: 2023-03-17
Payer: MEDICARE

## 2023-04-10 ENCOUNTER — OFFICE VISIT (OUTPATIENT)
Dept: CARDIOLOGY | Facility: CLINIC | Age: 73
End: 2023-04-10
Payer: MEDICARE

## 2023-04-10 VITALS
WEIGHT: 193.81 LBS | DIASTOLIC BLOOD PRESSURE: 82 MMHG | SYSTOLIC BLOOD PRESSURE: 126 MMHG | BODY MASS INDEX: 28.71 KG/M2 | HEIGHT: 69 IN | HEART RATE: 68 BPM | OXYGEN SATURATION: 98 %

## 2023-04-10 DIAGNOSIS — I25.10 CORONARY ARTERY DISEASE, UNSPECIFIED VESSEL OR LESION TYPE, UNSPECIFIED WHETHER ANGINA PRESENT, UNSPECIFIED WHETHER NATIVE OR TRANSPLANTED HEART: Primary | ICD-10-CM

## 2023-04-10 DIAGNOSIS — I25.10 CORONARY ARTERY DISEASE INVOLVING NATIVE CORONARY ARTERY OF NATIVE HEART WITHOUT ANGINA PECTORIS: ICD-10-CM

## 2023-04-10 DIAGNOSIS — B91 POLIOMYELITIS OSTEOPATHY OF LEFT LOWER LEG: ICD-10-CM

## 2023-04-10 DIAGNOSIS — M89.662 POLIOMYELITIS OSTEOPATHY OF LEFT LOWER LEG: ICD-10-CM

## 2023-04-10 PROCEDURE — 1101F PR PT FALLS ASSESS DOC 0-1 FALLS W/OUT INJ PAST YR: ICD-10-PCS | Mod: CPTII,S$GLB,, | Performed by: INTERNAL MEDICINE

## 2023-04-10 PROCEDURE — 1159F PR MEDICATION LIST DOCUMENTED IN MEDICAL RECORD: ICD-10-PCS | Mod: CPTII,S$GLB,, | Performed by: INTERNAL MEDICINE

## 2023-04-10 PROCEDURE — 3079F DIAST BP 80-89 MM HG: CPT | Mod: CPTII,S$GLB,, | Performed by: INTERNAL MEDICINE

## 2023-04-10 PROCEDURE — 3288F PR FALLS RISK ASSESSMENT DOCUMENTED: ICD-10-PCS | Mod: CPTII,S$GLB,, | Performed by: INTERNAL MEDICINE

## 2023-04-10 PROCEDURE — 3074F SYST BP LT 130 MM HG: CPT | Mod: CPTII,S$GLB,, | Performed by: INTERNAL MEDICINE

## 2023-04-10 PROCEDURE — 99214 OFFICE O/P EST MOD 30 MIN: CPT | Mod: S$GLB,,, | Performed by: INTERNAL MEDICINE

## 2023-04-10 PROCEDURE — 3008F PR BODY MASS INDEX (BMI) DOCUMENTED: ICD-10-PCS | Mod: CPTII,S$GLB,, | Performed by: INTERNAL MEDICINE

## 2023-04-10 PROCEDURE — 1126F PR PAIN SEVERITY QUANTIFIED, NO PAIN PRESENT: ICD-10-PCS | Mod: CPTII,S$GLB,, | Performed by: INTERNAL MEDICINE

## 2023-04-10 PROCEDURE — 1157F PR ADVANCE CARE PLAN OR EQUIV PRESENT IN MEDICAL RECORD: ICD-10-PCS | Mod: CPTII,S$GLB,, | Performed by: INTERNAL MEDICINE

## 2023-04-10 PROCEDURE — 1157F ADVNC CARE PLAN IN RCRD: CPT | Mod: CPTII,S$GLB,, | Performed by: INTERNAL MEDICINE

## 2023-04-10 PROCEDURE — 3074F PR MOST RECENT SYSTOLIC BLOOD PRESSURE < 130 MM HG: ICD-10-PCS | Mod: CPTII,S$GLB,, | Performed by: INTERNAL MEDICINE

## 2023-04-10 PROCEDURE — 99214 PR OFFICE/OUTPT VISIT, EST, LEVL IV, 30-39 MIN: ICD-10-PCS | Mod: S$GLB,,, | Performed by: INTERNAL MEDICINE

## 2023-04-10 PROCEDURE — 99999 PR PBB SHADOW E&M-EST. PATIENT-LVL III: ICD-10-PCS | Mod: PBBFAC,,, | Performed by: INTERNAL MEDICINE

## 2023-04-10 PROCEDURE — 3288F FALL RISK ASSESSMENT DOCD: CPT | Mod: CPTII,S$GLB,, | Performed by: INTERNAL MEDICINE

## 2023-04-10 PROCEDURE — 99999 PR PBB SHADOW E&M-EST. PATIENT-LVL III: CPT | Mod: PBBFAC,,, | Performed by: INTERNAL MEDICINE

## 2023-04-10 PROCEDURE — 3079F PR MOST RECENT DIASTOLIC BLOOD PRESSURE 80-89 MM HG: ICD-10-PCS | Mod: CPTII,S$GLB,, | Performed by: INTERNAL MEDICINE

## 2023-04-10 PROCEDURE — 3008F BODY MASS INDEX DOCD: CPT | Mod: CPTII,S$GLB,, | Performed by: INTERNAL MEDICINE

## 2023-04-10 PROCEDURE — 1126F AMNT PAIN NOTED NONE PRSNT: CPT | Mod: CPTII,S$GLB,, | Performed by: INTERNAL MEDICINE

## 2023-04-10 PROCEDURE — 1159F MED LIST DOCD IN RCRD: CPT | Mod: CPTII,S$GLB,, | Performed by: INTERNAL MEDICINE

## 2023-04-10 PROCEDURE — 1101F PT FALLS ASSESS-DOCD LE1/YR: CPT | Mod: CPTII,S$GLB,, | Performed by: INTERNAL MEDICINE

## 2023-04-10 NOTE — PROGRESS NOTES
Subjective:   Patient ID:  Olivier Cuenca is a 72 y.o. male who presents for evaluation of No chief complaint on file.        HPI  4.10.2022  Comes in for a six-month follow-up.  No significant interval changes.    Active and working on building a house  Denies any limiting chest pain.  Known to have mid myocardial LAD bridging.  He states working on his diet in lowering carbs intake.        10/13/2022  Comes in for follow-up after his heart catheterization.  Nonobstructive disease in his mid LAD bridging.    Denies any worsening angina.    No dyspnea.    No access site issues.  Right radial access site normal.          08/2022  70 yo male , 20 PQY, quit 37 years ago ,   Comes in for evaluation of chest pain, started about 3 weeks ago, was having sharp, left sided chest pain, parasternal mild right and left sided pains, seconds lasting , not with exertion   States when he is wore up and busy , no chest pain   He also uses crutches at home.  Not tender on exam.  No dyspnea on exertion.      Past Medical History:   Diagnosis Date    BPH (benign prostatic hyperplasia)     CAD (coronary artery disease) 2022    nonobs    CAD (coronary artery disease) 2022    Colon polyp     Erectile dysfunction     Ex-smoker     quit 1980s    Fatty liver     Herpes simplex     History of poliomyelitis     Migraine     Thalassemia minor        Past Surgical History:   Procedure Laterality Date    APPENDECTOMY      CHOLECYSTECTOMY      COLONOSCOPY N/A 2/28/2018    Procedure: COLONOSCOPY;  Surgeon: Bo Moreno MD;  Location: [facility] ENDO;  Service: Endoscopy;  Laterality: N/A;  Pt requesting no anesthesia - no sedation.  Will not have .    COLONOSCOPY N/A 6/10/2021    Procedure: COLONOSCOPY;  Surgeon: Juancho Spicer MD;  Location: [facility] ENDO;  Service: Endoscopy;  Laterality: N/A;    CORONARY ANGIOGRAPHY N/A 10/7/2022    Procedure: ANGIOGRAM, CORONARY ARTERY;  Surgeon: Pelon Lombardo MD;  Location: [facility] CATH LAB;  Service:  Cardiology;  Laterality: N/A;    FEMUR FRACTURE SURGERY  2020    after fall from deer stand    HERNIA REPAIR      LEFT HEART CATHETERIZATION Left 10/7/2022    Procedure: CATHETERIZATION, HEART, LEFT;  Surgeon: Pelon Lombardo MD;  Location: [facility] CATH LAB;  Service: Cardiology;  Laterality: Left;       Social History     Tobacco Use    Smoking status: Former     Types: Cigarettes     Start date: 3/13/1964     Quit date: 3/13/1985     Years since quittin.1    Smokeless tobacco: Never   Substance Use Topics    Alcohol use: No    Drug use: Yes     Types: Marijuana       Family History   Problem Relation Age of Onset    Dementia Mother     Hyperlipidemia Father     Melanoma Neg Hx        Review of Systems   Constitutional: Negative for fever and malaise/fatigue.   HENT:  Negative for sore throat.    Eyes:  Negative for blurred vision.   Cardiovascular:  Positive for chest pain. Negative for claudication, cyanosis, dyspnea on exertion, irregular heartbeat, leg swelling, near-syncope, orthopnea, palpitations, paroxysmal nocturnal dyspnea and syncope.   Respiratory:  Negative for cough and hemoptysis.    Hematologic/Lymphatic: Negative for bleeding problem.   Skin:  Negative for rash.   Musculoskeletal:  Negative for falls.   Gastrointestinal:  Negative for abdominal pain.   Genitourinary: Negative.    Neurological: Negative.    Psychiatric/Behavioral:  Negative for altered mental status and substance abuse.      Current Outpatient Medications on File Prior to Visit   Medication Sig    tadalafiL (CIALIS) 5 MG tablet Take 1 tablet (5 mg total) by mouth once daily. **May Cause Dizziness** *DO NOT TAKE WITH NITRATE *    valACYclovir (VALTREX) 1000 MG tablet 2 at onset cold sore, repeat 2 in 12 hours (Patient not taking: Reported on 4/10/2023)     No current facility-administered medications on file prior to visit.       Objective:   Objective:  Wt Readings from Last 3 Encounters:   04/10/23 87.9 kg (193 lb 12.6 oz)    11/22/22 90.3 kg (199 lb 1.2 oz)   11/21/22 90.5 kg (199 lb 8.3 oz)     Temp Readings from Last 3 Encounters:   11/21/22 97.3 °F (36.3 °C) (Tympanic)   10/07/22 97.6 °F (36.4 °C) (Temporal)   07/27/22 97.7 °F (36.5 °C) (Tympanic)     BP Readings from Last 3 Encounters:   04/10/23 126/82   11/22/22 (!) 143/80   11/21/22 134/88     Pulse Readings from Last 3 Encounters:   04/10/23 68   11/22/22 80   11/21/22 72       Physical Exam  Vitals reviewed.   Constitutional:       Appearance: He is well-developed.   HENT:      Head: Normocephalic and atraumatic.   Eyes:      General: No scleral icterus.     Conjunctiva/sclera: Conjunctivae normal.   Cardiovascular:      Rate and Rhythm: Normal rate and regular rhythm.      Pulses: Intact distal pulses.      Heart sounds: Normal heart sounds. No murmur heard.  Pulmonary:      Effort: No respiratory distress.      Breath sounds: No wheezing or rales.   Chest:      Chest wall: No tenderness.   Abdominal:      General: Bowel sounds are normal. There is no distension.      Palpations: Abdomen is soft.      Tenderness: There is no guarding.   Musculoskeletal:         General: Normal range of motion.      Cervical back: Normal range of motion and neck supple.   Skin:     General: Skin is warm.   Neurological:      Mental Status: He is alert and oriented to person, place, and time.       Lab Results   Component Value Date    CHOL 125 07/15/2022    CHOL 127 05/06/2021    CHOL 141 11/06/2019     Lab Results   Component Value Date    HDL 36 (L) 07/15/2022    HDL 33 (L) 05/06/2021    HDL 49 11/06/2019     Lab Results   Component Value Date    LDLCALC 64.4 07/15/2022    LDLCALC 69.2 05/06/2021    LDLCALC 77.2 11/06/2019     Lab Results   Component Value Date    TRIG 123 07/15/2022    TRIG 124 05/06/2021    TRIG 74 11/06/2019     Lab Results   Component Value Date    CHOLHDL 28.8 07/15/2022    CHOLHDL 26.0 05/06/2021    CHOLHDL 34.8 11/06/2019       Chemistry        Component Value  Date/Time     10/05/2022 0932    K 4.1 10/05/2022 0932     10/05/2022 0932    CO2 19 (L) 10/05/2022 0932    BUN 17 10/05/2022 0932    CREATININE 0.8 10/05/2022 0932    GLU 89 10/05/2022 0932        Component Value Date/Time    CALCIUM 9.5 10/05/2022 0932    ALKPHOS 103 07/15/2022 0827    AST 21 07/15/2022 0827    ALT 26 07/15/2022 0827    BILITOT 0.7 07/15/2022 0827    ESTGFRAFRICA >60.0 07/15/2022 0827    EGFRNONAA >60.0 07/15/2022 0827          Lab Results   Component Value Date    TSH 2.39 08/04/2009     Lab Results   Component Value Date    INR 1.0 10/05/2022     Lab Results   Component Value Date    WBC 6.31 10/05/2022    HGB 16.0 10/05/2022    HCT 48.0 10/05/2022    MCV 92 10/05/2022     10/05/2022     BNP  @LABRCNTIP(BNP,BNPTRIAGEBLO)@  CrCl cannot be calculated (Patient's most recent lab result is older than the maximum 7 days allowed.).     Imaging:  ======  No results found for this or any previous visit.    No results found for this or any previous visit.    No results found for this or any previous visit.    Results for orders placed during the hospital encounter of 07/27/22    X-Ray Chest PA And Lateral    Narrative  EXAMINATION:  XR CHEST PA AND LATERAL    CLINICAL HISTORY:  Chest pain, unspecified    TECHNIQUE:  PA and lateral views of the chest were performed.    COMPARISON:  Prior radiographs    FINDINGS:  Cardiac silhouette and mediastinal contours are normal.  Lungs are clear.  Osseous structures are intact.    Impression  No acute cardiopulmonary process.      Electronically signed by: Yuri Bran MD  Date:    07/27/2022  Time:    11:43    No results found for this or any previous visit.    No valid procedures specified.    Results for orders placed during the hospital encounter of 10/07/22    Cardiac catheterization    Conclusion    The pre-procedure left ventricular end diastolic pressure was 8.    The estimated blood loss was none.    There was non-obstructive coronary  artery disease.    Mid LAD bridging    The procedure log was documented by Documenter: Cely Antonio RN and verified by Pelon Lombardo MD.    Date: 10/7/2022  Time: 11:28 AM    Diagnostic Results:  ECG: Reviewed  Sinus rhythm.  Normal ECG.  The ASCVD Risk score (Amna DK, et al., 2019) failed to calculate for the following reasons:    The valid total cholesterol range is 130 to 320 mg/dL    Assessment and Plan:   Coronary artery disease, unspecified vessel or lesion type, unspecified whether angina present, unspecified whether native or transplanted heart    Poliomyelitis osteopathy of left lower leg    Coronary artery disease involving native coronary artery of native heart without angina pectoris        Mid LAD bridging.  Discussed beta-blockers.  Patient declined for now.    LDL 65.  Given CAD recommended low-dose of pravastatin however also declined for now he will continue to monitor.    Reviewed the coronary angiogram films with the patient.  Reviewed all tests and above medical conditions with patient in detail and formulated treatment plan.  Risk factor modification discussed.   Cardiac low salt diet discussed.  Maintaining healthy weight and weight loss goals were discussed in clinic.    Follow up in 6 months

## 2023-04-12 ENCOUNTER — TELEPHONE (OUTPATIENT)
Dept: CARDIOLOGY | Facility: CLINIC | Age: 73
End: 2023-04-12
Payer: MEDICARE

## 2023-04-12 ENCOUNTER — PES CALL (OUTPATIENT)
Dept: ADMINISTRATIVE | Facility: CLINIC | Age: 73
End: 2023-04-12
Payer: MEDICARE

## 2023-04-12 NOTE — TELEPHONE ENCOUNTER
Called patient to inform him of rescheduled appointment. Patient didn't answer left voicemail for patient to give us a call back.       ----- Message from Agnes Melendez sent at 4/11/2023  4:34 PM CDT -----  Contact: Kee Foster was returning the phone call. Please call him back at [phone].     Thanks  TS

## 2023-05-08 ENCOUNTER — TELEPHONE (OUTPATIENT)
Dept: ADMINISTRATIVE | Facility: HOSPITAL | Age: 73
End: 2023-05-08
Payer: MEDICARE

## 2023-05-23 ENCOUNTER — PATIENT MESSAGE (OUTPATIENT)
Dept: RESEARCH | Facility: HOSPITAL | Age: 73
End: 2023-05-23
Payer: MEDICARE

## 2023-05-29 PROBLEM — N28.1 BILATERAL RENAL CYSTS: Status: ACTIVE | Noted: 2023-05-29

## 2023-05-29 PROBLEM — D73.89 LESION OF SPLEEN: Status: ACTIVE | Noted: 2023-05-29

## 2023-05-29 PROBLEM — R91.8 PULMONARY NODULES: Status: ACTIVE | Noted: 2023-05-29

## 2023-05-31 NOTE — Clinical Note
A percutaneous stick to the left radial artery was performed. Ultrasound guidance was used to obtain access. Rhombic Flap Text: The defect edges were debeveled with a #15 scalpel blade.  Given the location of the defect and the proximity to free margins a rhombic flap was deemed most appropriate.  Using a sterile surgical marker, an appropriate rhombic flap was drawn incorporating the defect.    The area thus outlined was incised deep to adipose tissue with a #15 scalpel blade.  The skin margins were undermined to an appropriate distance in all directions utilizing iris scissors.

## 2023-06-12 ENCOUNTER — OFFICE VISIT (OUTPATIENT)
Dept: INTERNAL MEDICINE | Facility: CLINIC | Age: 73
End: 2023-06-12
Payer: MEDICARE

## 2023-06-12 ENCOUNTER — HOSPITAL ENCOUNTER (OUTPATIENT)
Dept: RADIOLOGY | Facility: HOSPITAL | Age: 73
Discharge: HOME | End: 2023-06-12
Attending: FAMILY MEDICINE
Payer: MEDICARE

## 2023-06-12 VITALS
HEIGHT: 69 IN | WEIGHT: 194.69 LBS | HEART RATE: 66 BPM | BODY MASS INDEX: 28.84 KG/M2 | SYSTOLIC BLOOD PRESSURE: 130 MMHG | RESPIRATION RATE: 20 BRPM | DIASTOLIC BLOOD PRESSURE: 80 MMHG

## 2023-06-12 VITALS
HEIGHT: 69 IN | BODY MASS INDEX: 28.77 KG/M2 | SYSTOLIC BLOOD PRESSURE: 140 MMHG | OXYGEN SATURATION: 99 % | HEART RATE: 65 BPM | TEMPERATURE: 98 F | WEIGHT: 194.25 LBS | DIASTOLIC BLOOD PRESSURE: 80 MMHG

## 2023-06-12 DIAGNOSIS — R91.8 PULMONARY NODULES: ICD-10-CM

## 2023-06-12 DIAGNOSIS — D73.89 LESION OF SPLEEN: ICD-10-CM

## 2023-06-12 DIAGNOSIS — R49.0 HOARSE: ICD-10-CM

## 2023-06-12 DIAGNOSIS — K21.9 GASTROESOPHAGEAL REFLUX DISEASE, UNSPECIFIED WHETHER ESOPHAGITIS PRESENT: ICD-10-CM

## 2023-06-12 DIAGNOSIS — N28.1 BILATERAL RENAL CYSTS: ICD-10-CM

## 2023-06-12 DIAGNOSIS — M89.662 POLIOMYELITIS OSTEOPATHY OF LEFT LOWER LEG: ICD-10-CM

## 2023-06-12 DIAGNOSIS — Z00.00 ENCOUNTER FOR PREVENTIVE HEALTH EXAMINATION: ICD-10-CM

## 2023-06-12 DIAGNOSIS — B91 POLIOMYELITIS OSTEOPATHY OF LEFT LOWER LEG: ICD-10-CM

## 2023-06-12 DIAGNOSIS — N40.0 BENIGN PROSTATIC HYPERPLASIA, UNSPECIFIED WHETHER LOWER URINARY TRACT SYMPTOMS PRESENT: ICD-10-CM

## 2023-06-12 DIAGNOSIS — Z12.5 SCREENING FOR PROSTATE CANCER: ICD-10-CM

## 2023-06-12 DIAGNOSIS — R10.9 RIGHT FLANK PAIN: ICD-10-CM

## 2023-06-12 DIAGNOSIS — M54.9 MID BACK PAIN: ICD-10-CM

## 2023-06-12 DIAGNOSIS — Z99.89 DEPENDENCE ON OTHER ENABLING MACHINES AND DEVICES: ICD-10-CM

## 2023-06-12 DIAGNOSIS — H93.19 TINNITUS, UNSPECIFIED LATERALITY: ICD-10-CM

## 2023-06-12 DIAGNOSIS — Z86.010 PERSONAL HISTORY OF COLONIC POLYPS: ICD-10-CM

## 2023-06-12 DIAGNOSIS — R26.9 ABNORMALITY OF GAIT AND MOBILITY: ICD-10-CM

## 2023-06-12 DIAGNOSIS — I25.10 CORONARY ARTERY DISEASE INVOLVING NATIVE CORONARY ARTERY OF NATIVE HEART WITHOUT ANGINA PECTORIS: ICD-10-CM

## 2023-06-12 DIAGNOSIS — N52.9 ERECTILE DYSFUNCTION, UNSPECIFIED ERECTILE DYSFUNCTION TYPE: ICD-10-CM

## 2023-06-12 DIAGNOSIS — Z00.00 ENCOUNTER FOR PREVENTATIVE ADULT HEALTH CARE EXAMINATION: Primary | ICD-10-CM

## 2023-06-12 DIAGNOSIS — K76.0 FATTY LIVER: ICD-10-CM

## 2023-06-12 DIAGNOSIS — K76.0 FATTY LIVER: Primary | ICD-10-CM

## 2023-06-12 DIAGNOSIS — M50.30 DDD (DEGENERATIVE DISC DISEASE), CERVICAL: ICD-10-CM

## 2023-06-12 DIAGNOSIS — Z87.891 EX-SMOKER: ICD-10-CM

## 2023-06-12 PROCEDURE — 3288F PR FALLS RISK ASSESSMENT DOCUMENTED: ICD-10-PCS | Mod: CPTII,S$GLB,, | Performed by: FAMILY MEDICINE

## 2023-06-12 PROCEDURE — 3079F PR MOST RECENT DIASTOLIC BLOOD PRESSURE 80-89 MM HG: ICD-10-PCS | Mod: CPTII,S$GLB,, | Performed by: NURSE PRACTITIONER

## 2023-06-12 PROCEDURE — 1157F PR ADVANCE CARE PLAN OR EQUIV PRESENT IN MEDICAL RECORD: ICD-10-PCS | Mod: CPTII,S$GLB,, | Performed by: FAMILY MEDICINE

## 2023-06-12 PROCEDURE — 3079F DIAST BP 80-89 MM HG: CPT | Mod: CPTII,S$GLB,, | Performed by: FAMILY MEDICINE

## 2023-06-12 PROCEDURE — 3077F PR MOST RECENT SYSTOLIC BLOOD PRESSURE >= 140 MM HG: ICD-10-PCS | Mod: CPTII,S$GLB,, | Performed by: FAMILY MEDICINE

## 2023-06-12 PROCEDURE — G0439 PPPS, SUBSEQ VISIT: HCPCS | Mod: S$GLB,,, | Performed by: NURSE PRACTITIONER

## 2023-06-12 PROCEDURE — 1125F PR PAIN SEVERITY QUANTIFIED, PAIN PRESENT: ICD-10-PCS | Mod: CPTII,S$GLB,, | Performed by: FAMILY MEDICINE

## 2023-06-12 PROCEDURE — 1170F FXNL STATUS ASSESSED: CPT | Mod: CPTII,S$GLB,, | Performed by: NURSE PRACTITIONER

## 2023-06-12 PROCEDURE — 99999 PR PBB SHADOW E&M-EST. PATIENT-LVL III: ICD-10-PCS | Mod: PBBFAC,,, | Performed by: NURSE PRACTITIONER

## 2023-06-12 PROCEDURE — 1157F ADVNC CARE PLAN IN RCRD: CPT | Mod: CPTII,S$GLB,, | Performed by: FAMILY MEDICINE

## 2023-06-12 PROCEDURE — 3008F PR BODY MASS INDEX (BMI) DOCUMENTED: ICD-10-PCS | Mod: CPTII,S$GLB,, | Performed by: NURSE PRACTITIONER

## 2023-06-12 PROCEDURE — 76770 US RETROPERITONEAL COMPLETE: ICD-10-PCS | Mod: 26,,, | Performed by: RADIOLOGY

## 2023-06-12 PROCEDURE — 99214 PR OFFICE/OUTPT VISIT, EST, LEVL IV, 30-39 MIN: ICD-10-PCS | Mod: S$GLB,,, | Performed by: FAMILY MEDICINE

## 2023-06-12 PROCEDURE — 1170F PR FUNCTIONAL STATUS ASSESSED: ICD-10-PCS | Mod: CPTII,S$GLB,, | Performed by: NURSE PRACTITIONER

## 2023-06-12 PROCEDURE — 1157F ADVNC CARE PLAN IN RCRD: CPT | Mod: CPTII,S$GLB,, | Performed by: NURSE PRACTITIONER

## 2023-06-12 PROCEDURE — 99999 PR PBB SHADOW E&M-EST. PATIENT-LVL III: CPT | Mod: PBBFAC,,, | Performed by: NURSE PRACTITIONER

## 2023-06-12 PROCEDURE — 1159F MED LIST DOCD IN RCRD: CPT | Mod: CPTII,S$GLB,, | Performed by: NURSE PRACTITIONER

## 2023-06-12 PROCEDURE — 1101F PR PT FALLS ASSESS DOC 0-1 FALLS W/OUT INJ PAST YR: ICD-10-PCS | Mod: CPTII,S$GLB,, | Performed by: FAMILY MEDICINE

## 2023-06-12 PROCEDURE — 3075F SYST BP GE 130 - 139MM HG: CPT | Mod: CPTII,S$GLB,, | Performed by: NURSE PRACTITIONER

## 2023-06-12 PROCEDURE — G0439 PR MEDICARE ANNUAL WELLNESS SUBSEQUENT VISIT: ICD-10-PCS | Mod: S$GLB,,, | Performed by: NURSE PRACTITIONER

## 2023-06-12 PROCEDURE — 1160F PR REVIEW ALL MEDS BY PRESCRIBER/CLIN PHARMACIST DOCUMENTED: ICD-10-PCS | Mod: CPTII,S$GLB,, | Performed by: NURSE PRACTITIONER

## 2023-06-12 PROCEDURE — 99999 PR PBB SHADOW E&M-EST. PATIENT-LVL III: CPT | Mod: PBBFAC,,, | Performed by: FAMILY MEDICINE

## 2023-06-12 PROCEDURE — 3079F DIAST BP 80-89 MM HG: CPT | Mod: CPTII,S$GLB,, | Performed by: NURSE PRACTITIONER

## 2023-06-12 PROCEDURE — 3008F BODY MASS INDEX DOCD: CPT | Mod: CPTII,S$GLB,, | Performed by: FAMILY MEDICINE

## 2023-06-12 PROCEDURE — 3288F FALL RISK ASSESSMENT DOCD: CPT | Mod: CPTII,S$GLB,, | Performed by: FAMILY MEDICINE

## 2023-06-12 PROCEDURE — 3008F BODY MASS INDEX DOCD: CPT | Mod: CPTII,S$GLB,, | Performed by: NURSE PRACTITIONER

## 2023-06-12 PROCEDURE — 99214 OFFICE O/P EST MOD 30 MIN: CPT | Mod: S$GLB,,, | Performed by: FAMILY MEDICINE

## 2023-06-12 PROCEDURE — 3008F PR BODY MASS INDEX (BMI) DOCUMENTED: ICD-10-PCS | Mod: CPTII,S$GLB,, | Performed by: FAMILY MEDICINE

## 2023-06-12 PROCEDURE — 1157F PR ADVANCE CARE PLAN OR EQUIV PRESENT IN MEDICAL RECORD: ICD-10-PCS | Mod: CPTII,S$GLB,, | Performed by: NURSE PRACTITIONER

## 2023-06-12 PROCEDURE — 1159F PR MEDICATION LIST DOCUMENTED IN MEDICAL RECORD: ICD-10-PCS | Mod: CPTII,S$GLB,, | Performed by: FAMILY MEDICINE

## 2023-06-12 PROCEDURE — 76770 US EXAM ABDO BACK WALL COMP: CPT | Mod: 26,,, | Performed by: RADIOLOGY

## 2023-06-12 PROCEDURE — 76770 US EXAM ABDO BACK WALL COMP: CPT | Mod: TC,PN

## 2023-06-12 PROCEDURE — 99999 PR PBB SHADOW E&M-EST. PATIENT-LVL III: ICD-10-PCS | Mod: PBBFAC,,, | Performed by: FAMILY MEDICINE

## 2023-06-12 PROCEDURE — 1159F MED LIST DOCD IN RCRD: CPT | Mod: CPTII,S$GLB,, | Performed by: FAMILY MEDICINE

## 2023-06-12 PROCEDURE — 1159F PR MEDICATION LIST DOCUMENTED IN MEDICAL RECORD: ICD-10-PCS | Mod: CPTII,S$GLB,, | Performed by: NURSE PRACTITIONER

## 2023-06-12 PROCEDURE — 1125F AMNT PAIN NOTED PAIN PRSNT: CPT | Mod: CPTII,S$GLB,, | Performed by: FAMILY MEDICINE

## 2023-06-12 PROCEDURE — 1101F PT FALLS ASSESS-DOCD LE1/YR: CPT | Mod: CPTII,S$GLB,, | Performed by: FAMILY MEDICINE

## 2023-06-12 PROCEDURE — 3075F PR MOST RECENT SYSTOLIC BLOOD PRESS GE 130-139MM HG: ICD-10-PCS | Mod: CPTII,S$GLB,, | Performed by: NURSE PRACTITIONER

## 2023-06-12 PROCEDURE — 1160F RVW MEDS BY RX/DR IN RCRD: CPT | Mod: CPTII,S$GLB,, | Performed by: NURSE PRACTITIONER

## 2023-06-12 PROCEDURE — 3079F PR MOST RECENT DIASTOLIC BLOOD PRESSURE 80-89 MM HG: ICD-10-PCS | Mod: CPTII,S$GLB,, | Performed by: FAMILY MEDICINE

## 2023-06-12 PROCEDURE — 3077F SYST BP >= 140 MM HG: CPT | Mod: CPTII,S$GLB,, | Performed by: FAMILY MEDICINE

## 2023-06-12 NOTE — PATIENT INSTRUCTIONS
Counseling and Referral of Other Preventative  (Italic type indicates deductible and co-insurance are waived)    Patient Name: Olivier Cuenca  Today's Date: 6/12/2023    Health Maintenance       Date Due Completion Date    High Dose Statin Never done ---    Shingles Vaccine (2 of 3) 11/29/2011 10/4/2011    TETANUS VACCINE 10/04/2021 10/4/2011    COVID-19 Vaccine (4 - Pfizer series) 12/23/2021 10/28/2021    PROSTATE-SPECIFIC ANTIGEN 07/15/2023 7/15/2022    Lipid Panel 07/15/2023 7/15/2022    Colorectal Cancer Screening 06/10/2026 6/10/2021        No orders of the defined types were placed in this encounter.      The following information is provided to all patients.  This information is to help you find resources for any of the problems found today that may be affecting your health:                Living healthy guide: www.[facility].[place].[facility]      Understanding Diabetes: www.diabetes.org      Eating healthy: www.cdc.gov/healthyweight      [facility] home safety checklist: www.cdc.gov/steadi/patient.html      Agency on Aging: www.goea.[place].[facility]      Alcoholics anonymous (AA): www.aa.org      Physical Activity: www.[place].nih.gov/aa4gpjt      Tobacco use: www.quitwithusla.org     Counseling and Referral of Other Preventative  (Italic type indicates deductible and co-insurance are waived)    Patient Name: Olivier Cuenca  Today's Date: 6/12/2023    Health Maintenance       Date Due Completion Date    High Dose Statin Never done ---    Shingles Vaccine (2 of 3) 11/29/2011 10/4/2011    TETANUS VACCINE 10/04/2021 10/4/2011    COVID-19 Vaccine (4 - Pfizer series) 12/23/2021 10/28/2021    PROSTATE-SPECIFIC ANTIGEN 07/15/2023 7/15/2022    Lipid Panel 07/15/2023 7/15/2022    Colorectal Cancer Screening 06/10/2026 6/10/2021        No orders of the defined types were placed in this encounter.      The following information is provided to all patients.  This information is to help you find resources for any of the problems found today  that may be affecting your health:                Living healthy guide: www.[facility].[place].[facility]      Understanding Diabetes: www.diabetes.org      Eating healthy: www.cdc.gov/healthyweight      CDC home safety checklist: www.cdc.gov/steadi/patient.html      Agency on Aging: www.goea.[place].[facility]      Alcoholics anonymous (AA): www.aa.org      Physical Activity: www.[place].nih.gov/fr0knfm      Tobacco use: www.quitwithusla.org

## 2023-06-12 NOTE — PROGRESS NOTES
"  Olivier Cuenca presented for a  Medicare AWV and comprehensive Health Risk Assessment today. The following components were reviewed and updated:    Medical history  Family History  Social history  Allergies and Current Medications  Health Risk Assessment  Health Maintenance  Care Team         ** See Completed Assessments for Annual Wellness Visit within the encounter summary.**         The following assessments were completed:  Living Situation  CAGE  Depression Screening  Timed Get Up and Go  Whisper Test  Cognitive Function Screening  Nutrition Screening  ADL Screening  PAQ Screening        Vitals:    06/12/23 0726   BP: 130/80   BP Location: Right arm   Patient Position: Sitting   Pulse: 66   Resp: 20   Weight: 88.3 kg (194 lb 10.7 oz)   Height:    Pain scale 5' 9" (1.753 m)    3/10 right mid back pain increased with ROM x 3 weeks     Body mass index is 28.75 kg/m².  Physical Exam  Constitutional:       General: He is not in acute distress.     Appearance: He is not ill-appearing or diaphoretic.   HENT:      Head: Normocephalic and atraumatic.      Mouth/Throat:      Mouth: Mucous membranes are moist.   Eyes:      General:         Right eye: No discharge.         Left eye: No discharge.      Extraocular Movements: Extraocular movements intact.      Conjunctiva/sclera: Conjunctivae normal.      Pupils: Pupils are equal, round, and reactive to light.   Cardiovascular:      Rate and Rhythm: Normal rate and regular rhythm.   Pulmonary:      Effort: Pulmonary effort is normal. No respiratory distress.   Skin:     General: Skin is warm and dry.   Neurological:      Mental Status: He is alert and oriented to person, place, and time. Mental status is at baseline.      Gait: Gait abnormal.      Comments: Walks with cane   Psychiatric:         Mood and Affect: Mood normal.         Behavior: Behavior normal.         Thought Content: Thought content normal.         Judgment: Judgment normal.           Diagnoses and health " risks identified today and associated recommendations/orders:    1. Encounter for preventative adult health care examination  Review for opioid screening: Patient does not have Rx for Opioids  Review for substance use disorder: Patient does not use substance per chart    Patient states he does not drink alcohol    I offered to discuss advanced care planning, including how to pick a person who would make decisions for you if you were unable to make them for yourself, called a health care power of , and what kind of decisions you might make such as use of life sustaining treatments such as ventilators and tube feeding when faced with a life limiting illness recorded on a living will that they will need to know. (How you want to be cared for as you near the end of your natural life)     X  Patient has advanced directives on file, which we reviewed, and they do not wish to make changes.    2. Poliomyelitis osteopathy of left lower leg, Dependence on other enabling machines and devices, Abnormality of gait and mobility  Monitor  Follow up as needed, directed  Patient walks with cane  Fall precautions    3. DDD (degenerative disc disease), cervical   Monitor  Follow up as needed, directed    4. Coronary artery disease involving native coronary artery of native heart without angina pectoris   Monitor  Follow up with Cardiology as directed    5. Erectile dysfunction, unspecified erectile dysfunction type, Benign prostatic hyperplasia, unspecified whether lower urinary tract symptoms present   Monitor  Follow up as needed, directed  Continue home Cialis    6.  Lesion of spleen, Bilateral renal cysts    Monitor  Follow up with PCP for further monitoring   Dx discussed with patient    7. Fatty liver, Gastroesophageal reflux disease, unspecified whether esophagitis present, Personal history of colonic polyps    Monitor  Follow up as directed with GI    8. Pulmonary nodules, Ex-smoker     Monitor  Follow up with  PCP    9. Chronic Hoarseness,  Tinnitus, unspecified laterality      Monitor  Patient is to Follow up with his already established ENT    10. New onset right Mid back pain  Patient reports 3/10 right mid back pain increased with ROM x 3 weeks. He denies any fever, chills, dysuria, hematuria, or GI symptoms. He denies any trauma.  Patient to follow up with PCP directly after Wellness visit for further evaluation           Provided Olivier with a 5-10 year written screening schedule and personal prevention plan. Recommendations were developed using the USPSTF age appropriate recommendations. Education, counseling, and referrals were provided as needed. After Visit Summary printed and given to patient which includes a list of additional screenings\tests needed.    Follow up in about 1 year (around 6/12/2024) for Annual wellness visit.    GIBRAN Ortega

## 2023-06-12 NOTE — PROGRESS NOTES
Subjective:      Patient ID: Olivier Cuenca is a 72 y.o. male.    Chief Complaint: Flank Pain (Right)    HPI 2-3 weeks of nontraumatic right flank pain cane occasionally radiating to right lateral abdomen no other radiation no urinary changes he does do some heavy lifting with 50 lb sex of feed but nothing recently infrequent over-the-counter Motrin 4 mg total completely resolved pain pain is daily with intermittent intensity no wakening pain no fever complaints.  No history of similar.  He does have significant degenerative spine disease seeing Dr. Heather Lala.  No rash.  No change with eating.  Some increased with rotation torso.  No other exacerbating early factors pain is not severe ; appetite good, he is concerned about his kidneys with a history of renal cysts which was simple in the right side    Voluntary weight loss has eliminated breath from his diet  Past Medical History:   Diagnosis Date    Bilateral renal cysts 5/29/2023    BPH (benign prostatic hyperplasia)     CAD (coronary artery disease) 2022    nonobs    CAD (coronary artery disease) 2022    Colon polyp     Erectile dysfunction     Ex-smoker     quit 1980s    Fatty liver     Herpes simplex     History of poliomyelitis     Migraine     Thalassemia minor       Past Surgical History:   Procedure Laterality Date    APPENDECTOMY      CHOLECYSTECTOMY      COLONOSCOPY N/A 2/28/2018    Procedure: COLONOSCOPY;  Surgeon: Bo Moreno MD;  Location: [facility] ENDO;  Service: Endoscopy;  Laterality: N/A;  Pt requesting no anesthesia - no sedation.  Will not have .    COLONOSCOPY N/A 6/10/2021    Procedure: COLONOSCOPY;  Surgeon: Juancho Spicer MD;  Location: [facility] ENDO;  Service: Endoscopy;  Laterality: N/A;    CORONARY ANGIOGRAPHY N/A 10/7/2022    Procedure: ANGIOGRAM, CORONARY ARTERY;  Surgeon: Pelon Lombardo MD;  Location: [facility] CATH LAB;  Service: Cardiology;  Laterality: N/A;    FEMUR FRACTURE SURGERY  02/2020    after fall from deer stand     HERNIA REPAIR      LEFT HEART CATHETERIZATION Left 10/7/2022    Procedure: CATHETERIZATION, HEART, LEFT;  Surgeon: Pelon Lombardo MD;  Location: [facility] CATH LAB;  Service: Cardiology;  Laterality: Left;      Social History     Socioeconomic History    Marital status: Single    Number of children: 2   Occupational History    Occupation:      Employer: [employer]     Comment: [employer]   Tobacco Use    Smoking status: Former     Types: Cigarettes     Start date: 3/13/1964     Quit date: 3/13/1985     Years since quittin.2    Smokeless tobacco: Never   Substance and Sexual Activity    Alcohol use: No    Drug use: Yes     Types: Marijuana     Social Determinants of Health     Financial Resource Strain: Low Risk     Difficulty of Paying Living Expenses: Not very hard   Food Insecurity: No Food Insecurity    Worried About Running Out of Food in the Last Year: Never true    Ran Out of Food in the Last Year: Never true   Transportation Needs: No Transportation Needs    Lack of Transportation (Medical): No    Lack of Transportation (Non-Medical): No   Physical Activity: Inactive    Days of Exercise per Week: 0 days    Minutes of Exercise per Session: 0 min   Stress: No Stress Concern Present    Feeling of Stress : Not at all   Social Connections: Moderately Integrated    Frequency of Communication with Friends and Family: More than three times a week    Frequency of Social Gatherings with Friends and Family: Once a week    Attends Christian Services: More than 4 times per year    Active Member of Clubs or Organizations: Yes    Attends Club or Organization Meetings: More than 4 times per year    Marital Status:    Housing Stability: Low Risk     Unable to Pay for Housing in the Last Year: No    Number of Places Lived in the Last Year: 1    Unstable Housing in the Last Year: No      Family History   Problem Relation Age of Onset    Dementia Mother     Hyperlipidemia  Father     Melanoma Neg Hx       Review of Systems        Objective:     Physical Exam  Vitals and nursing note reviewed.   Constitutional:       Appearance: He is well-developed.   HENT:      Head: Normocephalic and atraumatic.   Neck:      Vascular: No carotid bruit.   Cardiovascular:      Rate and Rhythm: Normal rate and regular rhythm.   Pulmonary:      Effort: Pulmonary effort is normal.      Breath sounds: Normal breath sounds.   Abdominal:      General: Bowel sounds are normal. There is no distension.      Palpations: Abdomen is soft. There is no mass.      Tenderness: There is no abdominal tenderness. There is no guarding or rebound.   Musculoskeletal:      Cervical back: Normal range of motion and neck supple.   Lymphadenopathy:      Cervical: No cervical adenopathy.   Skin:     General: Skin is warm and dry.   Neurological:      Mental Status: He is alert and oriented to person, place, and time.   Psychiatric:         Behavior: Behavior normal.         Judgment: Judgment normal.   He points to right upper paralumbar lower thoracic area there is no rash in this area there maybe some tenderness to palpation in this area  Assessment:         ICD-10-CM ICD-9-CM   1. Fatty liver  K76.0 571.8   2. Benign prostatic hyperplasia, unspecified whether lower urinary tract symptoms present  N40.0 600.00   3. Gastroesophageal reflux disease, unspecified whether esophagitis present  K21.9 530.81   4. Coronary artery disease involving native coronary artery of native heart without angina pectoris  I25.10 414.01   5. Screening for prostate cancer  Z12.5 V76.44   6. Right flank pain  R10.9 789.09    Wt loss  Plan:    Monitor wt    1. Fatty liver  -     Lipid Panel; Future; Expected date: 08/16/2023    2. Benign prostatic hyperplasia, unspecified whether lower urinary tract symptoms present    3. Gastroesophageal reflux disease, unspecified whether esophagitis present    4. Coronary artery disease involving native coronary  artery of native heart without angina pectoris  Overview:  nonobs    Orders:  -     Comprehensive Metabolic Panel; Future; Expected date: 08/16/2023  -     CBC Auto Differential; Future; Expected date: 08/16/2023    5. Screening for prostate cancer  -     PSA, Screening; Future; Expected date: 08/16/2023    6. Right flank pain  -     US Kidney; Future; Expected date: 06/12/2023  -     Urinalysis; Future  -     Ambulatory referral/consult to Physical/Occupational Therapy; Future; Expected date: 06/19/2023       Lab August and f/u  Ua today  If no better fw weeks followup sooner if any worsening or change in symptoms or lack of resolution      Avoid heavy lifting

## 2023-06-19 DIAGNOSIS — R82.90 ABNORMAL URINE: Primary | ICD-10-CM

## 2023-06-19 DIAGNOSIS — N28.1 RENAL CYST: ICD-10-CM

## 2023-06-20 NOTE — PROGRESS NOTES
I spoke with the patient informed him of the following information concerning his urine results from Dr Giang    Please notify urine does not show blood consistent with a kidney stone   It does show a few bacteria which is not consistent with his flank pain   I do recommend he recheck when he can soon his urine with urine culture     Both kidneys show some cysts one new from a few years ago. Dont think the cause of his pain but recommend he see urology      The patient stated that he will see the urology department on 6/21/23 Dr Giang at 1:45 pm. The patient stated thanks for the call and information

## 2023-06-21 ENCOUNTER — OFFICE VISIT (OUTPATIENT)
Dept: UROLOGY | Facility: CLINIC | Age: 73
End: 2023-06-21
Payer: MEDICARE

## 2023-06-21 DIAGNOSIS — R35.0 BENIGN PROSTATIC HYPERPLASIA WITH URINARY FREQUENCY: ICD-10-CM

## 2023-06-21 DIAGNOSIS — N40.1 BENIGN PROSTATIC HYPERPLASIA WITH URINARY FREQUENCY: ICD-10-CM

## 2023-06-21 DIAGNOSIS — N28.1 RENAL CYST: Primary | ICD-10-CM

## 2023-06-21 DIAGNOSIS — R97.20 ELEVATED PSA: ICD-10-CM

## 2023-06-21 PROCEDURE — 99204 PR OFFICE/OUTPT VISIT, NEW, LEVL IV, 45-59 MIN: ICD-10-PCS | Mod: S$GLB,,, | Performed by: UROLOGY

## 2023-06-21 PROCEDURE — 1157F PR ADVANCE CARE PLAN OR EQUIV PRESENT IN MEDICAL RECORD: ICD-10-PCS | Mod: CPTII,S$GLB,, | Performed by: UROLOGY

## 2023-06-21 PROCEDURE — 99999 PR PBB SHADOW E&M-EST. PATIENT-LVL II: ICD-10-PCS | Mod: PBBFAC,,, | Performed by: UROLOGY

## 2023-06-21 PROCEDURE — 99204 OFFICE O/P NEW MOD 45 MIN: CPT | Mod: S$GLB,,, | Performed by: UROLOGY

## 2023-06-21 PROCEDURE — 1157F ADVNC CARE PLAN IN RCRD: CPT | Mod: CPTII,S$GLB,, | Performed by: UROLOGY

## 2023-06-21 PROCEDURE — 1159F PR MEDICATION LIST DOCUMENTED IN MEDICAL RECORD: ICD-10-PCS | Mod: CPTII,S$GLB,, | Performed by: UROLOGY

## 2023-06-21 PROCEDURE — 99999 PR PBB SHADOW E&M-EST. PATIENT-LVL II: CPT | Mod: PBBFAC,,, | Performed by: UROLOGY

## 2023-06-21 PROCEDURE — 1159F MED LIST DOCD IN RCRD: CPT | Mod: CPTII,S$GLB,, | Performed by: UROLOGY

## 2023-06-21 PROCEDURE — 1160F RVW MEDS BY RX/DR IN RCRD: CPT | Mod: CPTII,S$GLB,, | Performed by: UROLOGY

## 2023-06-21 PROCEDURE — 1160F PR REVIEW ALL MEDS BY PRESCRIBER/CLIN PHARMACIST DOCUMENTED: ICD-10-PCS | Mod: CPTII,S$GLB,, | Performed by: UROLOGY

## 2023-06-21 NOTE — PROGRESS NOTES
Chief Complaint:   Encounter Diagnoses   Name Primary?    Renal cyst Yes    Benign prostatic hyperplasia with urinary frequency     Elevated PSA        HPI:  72-year-old gentleman who comes in for re-evaluation of bilateral renal cysts.  In addition patient has BPH which he treats with Cialis 5 mg usually about twice per week.  He states that when he does take it symptoms definitely.  No gross hematuria, no microscopic hematuria, he is never been a smoker.  He has a weak stream, without the Cialis with it indefinitely improves it.  He does have increased frequency and urgency, no leakage, gets up 4 times per evening, known stable bilateral cysts.  PSA does appear to be rising, last check though was last summer.  No other urological history.  No family history of urological cancers or stones, except for prostate cancer in his father but diagnosed after he was 80.    Allergies:  Codeine and Codeine sulfate    Medications:  See MAR    Review of Systems:  General: No fever, chills, fatigability, or weight loss.  Skin: No rashes, itching, or changes in color or texture of skin.  Chest: Denies KAN, cyanosis, wheezing, cough, and sputum production.  Abdomen: Appetite fine. No weight loss. Denies diarrhea, abdominal pain, hematemesis, or blood in stool.  Musculoskeletal: No joint stiffness or swelling. Denies back pain.  : As above.  All other review of systems negative.    PMH:   has a past medical history of Bilateral renal cysts (5/29/2023), BPH (benign prostatic hyperplasia), CAD (coronary artery disease) (2022), CAD (coronary artery disease) (2022), Colon polyp, Erectile dysfunction, Ex-smoker, Fatty liver, Herpes simplex, History of poliomyelitis, Migraine, and Thalassemia minor.    PSH:   has a past surgical history that includes Cholecystectomy; Appendectomy; Hernia repair; Colonoscopy (N/A, 2/28/2018); Femur fracture surgery (02/2020); Colonoscopy (N/A, 6/10/2021); Left heart catheterization (Left, 10/7/2022);  and Coronary angiography (N/A, 10/7/2022).    FamHx: family history includes Dementia in his mother; Hyperlipidemia in his father.    SocHx:  reports that he quit smoking about 38 years ago. His smoking use included cigarettes. He started smoking about 59 years ago. He has never used smokeless tobacco. He reports current drug use. Drug: Marijuana. He reports that he does not drink alcohol.      Physical Exam:  There were no vitals filed for this visit.  General: A&Ox3, no apparent distress, no deformities  Neck: No masses, normal ROM  Lungs: normal inspiration, no use of accessory muscles  Heart: normal pulse, no arrhythmias  Abdomen: Soft, NT, ND, no masses, no hernias, no hepatosplenomegaly  Skin: The skin is warm and dry. No jaundice.  Ext: No c/c/e.  VAIBHAV: 6/23- Normal rectal tone. Prost 25 gm no nodules or masses appreciated. SV not palpable. Perineum and anus normal.    Labs/Studies:   PSA 3.2 7/22   PSA 2.2 5/21  PSA 1.6 11/19  Creatinine 0.8 10/22   HENRRY right renal cyst 1.1 cm, left renal cyst 3.7 cm, stable 6/23  CT with contrast bilateral renal cysts 3/18    Impression/Plan:     1. BPH- patient's symptoms are remarkably improved by taking Cialis 5 mg, he is only taking this about twice per week though.  He may attempt to take this more frequently and see if we can alleviate symptoms, otherwise it is relatively stable and not too bothersome.  See me back in 6 weeks and re-evaluate, call with any further complaints prior to that time.      2. Elevated PSA- the patient's PSA does appear to be rising over the last few years, repeat today and proceed as appropriate, of note VAIBHAV is normal.      3. Renal cysts- this appear to be stable we can follow him expectantly.

## 2023-08-01 ENCOUNTER — LAB VISIT (OUTPATIENT)
Dept: LAB | Facility: HOSPITAL | Age: 73
End: 2023-08-01
Attending: FAMILY MEDICINE
Payer: MEDICARE

## 2023-08-01 DIAGNOSIS — Z12.5 SCREENING FOR PROSTATE CANCER: ICD-10-CM

## 2023-08-01 DIAGNOSIS — I25.10 CORONARY ARTERY DISEASE INVOLVING NATIVE CORONARY ARTERY OF NATIVE HEART WITHOUT ANGINA PECTORIS: ICD-10-CM

## 2023-08-01 DIAGNOSIS — K76.0 FATTY LIVER: ICD-10-CM

## 2023-08-01 LAB
ALBUMIN SERPL BCP-MCNC: 4 G/DL (ref 3.5–5.2)
ALP SERPL-CCNC: 100 U/L (ref 55–135)
ALT SERPL W/O P-5'-P-CCNC: 26 U/L (ref 10–44)
ANION GAP SERPL CALC-SCNC: 11 MMOL/L (ref 8–16)
AST SERPL-CCNC: 24 U/L (ref 10–40)
BASOPHILS # BLD AUTO: 0.03 K/UL (ref 0–0.2)
BASOPHILS NFR BLD: 0.6 % (ref 0–1.9)
BILIRUB SERPL-MCNC: 0.8 MG/DL (ref 0.1–1)
BUN SERPL-MCNC: 14 MG/DL (ref 8–23)
CALCIUM SERPL-MCNC: 9.4 MG/DL (ref 8.7–10.5)
CHLORIDE SERPL-SCNC: 104 MMOL/L (ref 95–110)
CHOLEST SERPL-MCNC: 127 MG/DL (ref 120–199)
CHOLEST/HDLC SERPL: 3.5 {RATIO} (ref 2–5)
CO2 SERPL-SCNC: 27 MMOL/L (ref 23–29)
COMPLEXED PSA SERPL-MCNC: 4.3 NG/ML (ref 0–4)
CREAT SERPL-MCNC: 0.8 MG/DL (ref 0.5–1.4)
DIFFERENTIAL METHOD: NORMAL
EOSINOPHIL # BLD AUTO: 0.1 K/UL (ref 0–0.5)
EOSINOPHIL NFR BLD: 1.7 % (ref 0–8)
ERYTHROCYTE [DISTWIDTH] IN BLOOD BY AUTOMATED COUNT: 13.2 % (ref 11.5–14.5)
EST. GFR  (NO RACE VARIABLE): >60 ML/MIN/1.73 M^2
GLUCOSE SERPL-MCNC: 91 MG/DL (ref 70–110)
HCT VFR BLD AUTO: 49.8 % (ref 40–54)
HDLC SERPL-MCNC: 36 MG/DL (ref 40–75)
HDLC SERPL: 28.3 % (ref 20–50)
HGB BLD-MCNC: 16.3 G/DL (ref 14–18)
IMM GRANULOCYTES # BLD AUTO: 0.02 K/UL (ref 0–0.04)
IMM GRANULOCYTES NFR BLD AUTO: 0.4 % (ref 0–0.5)
LDLC SERPL CALC-MCNC: 71.8 MG/DL (ref 63–159)
LYMPHOCYTES # BLD AUTO: 1.4 K/UL (ref 1–4.8)
LYMPHOCYTES NFR BLD: 25.9 % (ref 18–48)
MCH RBC QN AUTO: 30.4 PG (ref 27–31)
MCHC RBC AUTO-ENTMCNC: 32.7 G/DL (ref 32–36)
MCV RBC AUTO: 93 FL (ref 82–98)
MONOCYTES # BLD AUTO: 0.6 K/UL (ref 0.3–1)
MONOCYTES NFR BLD: 10.5 % (ref 4–15)
NEUTROPHILS # BLD AUTO: 3.3 K/UL (ref 1.8–7.7)
NEUTROPHILS NFR BLD: 60.9 % (ref 38–73)
NONHDLC SERPL-MCNC: 91 MG/DL
NRBC BLD-RTO: 0 /100 WBC
PLATELET # BLD AUTO: 183 K/UL (ref 150–450)
PMV BLD AUTO: 12.2 FL (ref 9.2–12.9)
POTASSIUM SERPL-SCNC: 4.9 MMOL/L (ref 3.5–5.1)
PROT SERPL-MCNC: 7.4 G/DL (ref 6–8.4)
RBC # BLD AUTO: 5.36 M/UL (ref 4.6–6.2)
SODIUM SERPL-SCNC: 142 MMOL/L (ref 136–145)
TRIGL SERPL-MCNC: 96 MG/DL (ref 30–150)
WBC # BLD AUTO: 5.44 K/UL (ref 3.9–12.7)

## 2023-08-01 PROCEDURE — 85025 COMPLETE CBC W/AUTO DIFF WBC: CPT | Performed by: FAMILY MEDICINE

## 2023-08-01 PROCEDURE — 36415 COLL VENOUS BLD VENIPUNCTURE: CPT | Performed by: FAMILY MEDICINE

## 2023-08-01 PROCEDURE — 80061 LIPID PANEL: CPT | Performed by: FAMILY MEDICINE

## 2023-08-01 PROCEDURE — 80053 COMPREHEN METABOLIC PANEL: CPT | Performed by: FAMILY MEDICINE

## 2023-08-01 PROCEDURE — 84153 ASSAY OF PSA TOTAL: CPT | Performed by: FAMILY MEDICINE

## 2023-08-03 ENCOUNTER — PATIENT MESSAGE (OUTPATIENT)
Dept: RESEARCH | Facility: HOSPITAL | Age: 73
End: 2023-08-03
Payer: MEDICARE

## 2023-09-06 ENCOUNTER — OFFICE VISIT (OUTPATIENT)
Dept: UROLOGY | Facility: CLINIC | Age: 73
End: 2023-09-06
Payer: MEDICARE

## 2023-09-06 VITALS
SYSTOLIC BLOOD PRESSURE: 149 MMHG | HEART RATE: 70 BPM | TEMPERATURE: 98 F | HEIGHT: 69 IN | WEIGHT: 186.5 LBS | DIASTOLIC BLOOD PRESSURE: 84 MMHG | BODY MASS INDEX: 27.62 KG/M2 | RESPIRATION RATE: 16 BRPM

## 2023-09-06 DIAGNOSIS — R35.0 BENIGN PROSTATIC HYPERPLASIA WITH URINARY FREQUENCY: Primary | ICD-10-CM

## 2023-09-06 DIAGNOSIS — N40.1 BENIGN PROSTATIC HYPERPLASIA WITH URINARY FREQUENCY: Primary | ICD-10-CM

## 2023-09-06 DIAGNOSIS — R97.20 ELEVATED PSA: ICD-10-CM

## 2023-09-06 DIAGNOSIS — N28.1 RENAL CYST: ICD-10-CM

## 2023-09-06 LAB
BILIRUB SERPL-MCNC: NORMAL MG/DL
BLOOD URINE, POC: NORMAL
CLARITY, POC UA: CLEAR
COLOR, POC UA: YELLOW
GLUCOSE UR QL STRIP: NORMAL
KETONES UR QL STRIP: NORMAL
LEUKOCYTE ESTERASE URINE, POC: NORMAL
NITRITE, POC UA: NORMAL
PH, POC UA: 6
POC RESIDUAL URINE VOLUME: 7 ML (ref 0–100)
PROTEIN, POC: NORMAL
SPECIFIC GRAVITY, POC UA: NORMAL
UROBILINOGEN, POC UA: 0.2

## 2023-09-06 PROCEDURE — 99214 PR OFFICE/OUTPT VISIT, EST, LEVL IV, 30-39 MIN: ICD-10-PCS | Mod: S$GLB,,, | Performed by: UROLOGY

## 2023-09-06 PROCEDURE — 51798 POCT BLADDER SCAN: ICD-10-PCS | Mod: S$GLB,,, | Performed by: UROLOGY

## 2023-09-06 PROCEDURE — 3077F PR MOST RECENT SYSTOLIC BLOOD PRESSURE >= 140 MM HG: ICD-10-PCS | Mod: CPTII,S$GLB,, | Performed by: UROLOGY

## 2023-09-06 PROCEDURE — 3008F PR BODY MASS INDEX (BMI) DOCUMENTED: ICD-10-PCS | Mod: CPTII,S$GLB,, | Performed by: UROLOGY

## 2023-09-06 PROCEDURE — 3079F PR MOST RECENT DIASTOLIC BLOOD PRESSURE 80-89 MM HG: ICD-10-PCS | Mod: CPTII,S$GLB,, | Performed by: UROLOGY

## 2023-09-06 PROCEDURE — 1159F MED LIST DOCD IN RCRD: CPT | Mod: CPTII,S$GLB,, | Performed by: UROLOGY

## 2023-09-06 PROCEDURE — 1159F PR MEDICATION LIST DOCUMENTED IN MEDICAL RECORD: ICD-10-PCS | Mod: CPTII,S$GLB,, | Performed by: UROLOGY

## 2023-09-06 PROCEDURE — 3079F DIAST BP 80-89 MM HG: CPT | Mod: CPTII,S$GLB,, | Performed by: UROLOGY

## 2023-09-06 PROCEDURE — 1157F PR ADVANCE CARE PLAN OR EQUIV PRESENT IN MEDICAL RECORD: ICD-10-PCS | Mod: CPTII,S$GLB,, | Performed by: UROLOGY

## 2023-09-06 PROCEDURE — 3077F SYST BP >= 140 MM HG: CPT | Mod: CPTII,S$GLB,, | Performed by: UROLOGY

## 2023-09-06 PROCEDURE — 51798 US URINE CAPACITY MEASURE: CPT | Mod: S$GLB,,, | Performed by: UROLOGY

## 2023-09-06 PROCEDURE — 99999 PR PBB SHADOW E&M-EST. PATIENT-LVL III: ICD-10-PCS | Mod: PBBFAC,,, | Performed by: UROLOGY

## 2023-09-06 PROCEDURE — 3008F BODY MASS INDEX DOCD: CPT | Mod: CPTII,S$GLB,, | Performed by: UROLOGY

## 2023-09-06 PROCEDURE — 1160F PR REVIEW ALL MEDS BY PRESCRIBER/CLIN PHARMACIST DOCUMENTED: ICD-10-PCS | Mod: CPTII,S$GLB,, | Performed by: UROLOGY

## 2023-09-06 PROCEDURE — 1126F PR PAIN SEVERITY QUANTIFIED, NO PAIN PRESENT: ICD-10-PCS | Mod: CPTII,S$GLB,, | Performed by: UROLOGY

## 2023-09-06 PROCEDURE — 99999 PR PBB SHADOW E&M-EST. PATIENT-LVL III: CPT | Mod: PBBFAC,,, | Performed by: UROLOGY

## 2023-09-06 PROCEDURE — 99214 OFFICE O/P EST MOD 30 MIN: CPT | Mod: S$GLB,,, | Performed by: UROLOGY

## 2023-09-06 PROCEDURE — 1160F RVW MEDS BY RX/DR IN RCRD: CPT | Mod: CPTII,S$GLB,, | Performed by: UROLOGY

## 2023-09-06 PROCEDURE — 81002 POCT URINE DIPSTICK WITHOUT MICROSCOPE: ICD-10-PCS | Mod: S$GLB,,, | Performed by: UROLOGY

## 2023-09-06 PROCEDURE — 81002 URINALYSIS NONAUTO W/O SCOPE: CPT | Mod: S$GLB,,, | Performed by: UROLOGY

## 2023-09-06 PROCEDURE — 1157F ADVNC CARE PLAN IN RCRD: CPT | Mod: CPTII,S$GLB,, | Performed by: UROLOGY

## 2023-09-06 PROCEDURE — 1126F AMNT PAIN NOTED NONE PRSNT: CPT | Mod: CPTII,S$GLB,, | Performed by: UROLOGY

## 2023-09-06 NOTE — PROGRESS NOTES
Chief Complaint:   Encounter Diagnoses   Name Primary?    Benign prostatic hyperplasia with urinary frequency Yes    Elevated PSA     Renal cyst        HPI:    9/6/23- voiding is definitely better when he takes Cialis 5 mg on a daily basis.  PSA has gone up, here today to discuss these findings.    72-year-old gentleman who comes in for re-evaluation of bilateral renal cysts.  In addition patient has BPH which he treats with Cialis 5 mg usually about twice per week.  He states that when he does take it symptoms definitely.  No gross hematuria, no microscopic hematuria, he is never been a smoker.  He has a weak stream, without the Cialis with it indefinitely improves it.  He does have increased frequency and urgency, no leakage, gets up 4 times per evening, known stable bilateral cysts.  PSA does appear to be rising, last check though was last summer.  No other urological history.  No family history of urological cancers or stones, except for prostate cancer in his father but diagnosed after he was 80.    Allergies:  Codeine and Codeine sulfate    Medications:  See MAR    Review of Systems:  General: No fever, chills, fatigability, or weight loss.  Skin: No rashes, itching, or changes in color or texture of skin.  Chest: Denies KAN, cyanosis, wheezing, cough, and sputum production.  Abdomen: Appetite fine. No weight loss. Denies diarrhea, abdominal pain, hematemesis, or blood in stool.  Musculoskeletal: No joint stiffness or swelling. Denies back pain.  : As above.  All other review of systems negative.    PMH:   has a past medical history of Bilateral renal cysts (5/29/2023), BPH (benign prostatic hyperplasia), CAD (coronary artery disease) (2022), CAD (coronary artery disease) (2022), Colon polyp, Erectile dysfunction, Ex-smoker, Fatty liver, Herpes simplex, History of poliomyelitis, Migraine, and Thalassemia minor.    PSH:   has a past surgical history that includes Cholecystectomy; Appendectomy; Hernia repair;  Colonoscopy (N/A, 2/28/2018); Femur fracture surgery (02/2020); Colonoscopy (N/A, 6/10/2021); Left heart catheterization (Left, 10/7/2022); and Coronary angiography (N/A, 10/7/2022).    FamHx: family history includes Dementia in his mother; Hyperlipidemia in his father.    SocHx:  reports that he quit smoking about 38 years ago. His smoking use included cigarettes. He started smoking about 59 years ago. He has never used smokeless tobacco. He reports current drug use. Drug: Marijuana. He reports that he does not drink alcohol.      Physical Exam:  Vitals:    09/06/23 0926   BP: (!) 149/84   Pulse: 70   Resp: 16   Temp: 98 °F (36.7 °C)     General: A&Ox3, no apparent distress, no deformities  Neck: No masses, normal ROM  Lungs: normal inspiration, no use of accessory muscles  Heart: normal pulse, no arrhythmias  Abdomen: Soft, NT, ND, no masses, no hernias, no hepatosplenomegaly  Skin: The skin is warm and dry. No jaundice.  Ext: No c/c/e.  VAIBHAV: 6/23- Normal rectal tone. Prost 25 gm no nodules or masses appreciated. SV not palpable. Perineum and anus normal.    Labs/Studies:   UA normal 9/23  PVR 7 ml 9/23  PSA 4.3 8/23  PSA 3.2 7/22   PSA 2.2 5/21  PSA 1.6 11/19  Creatinine 0.8 10/22   HENRRY right renal cyst 1.1 cm, left renal cyst 3.7 cm, stable 6/23  CT with contrast bilateral renal cysts 3/18    Impression/Plan:      1. BPH- patient's symptoms are remarkably improved when he takes Cialis 5 mg on a regular basis.  He will attempt to be more routine.  Call with any further issues prior to the next appointment.      2. Elevated PSA- PSA continues to rise but this may be a false evaluation.  Therefore repeat in 3 months, if abnormal then will proceed with an MRI versus biopsy.      3. Renal cysts- this appear to be stable, we can follow him expectantly.

## 2023-09-18 ENCOUNTER — OFFICE VISIT (OUTPATIENT)
Dept: INTERNAL MEDICINE | Facility: CLINIC | Age: 73
End: 2023-09-18
Payer: MEDICARE

## 2023-09-18 VITALS
RESPIRATION RATE: 16 BRPM | WEIGHT: 186.94 LBS | HEIGHT: 69 IN | BODY MASS INDEX: 27.69 KG/M2 | SYSTOLIC BLOOD PRESSURE: 132 MMHG | OXYGEN SATURATION: 95 % | TEMPERATURE: 97 F | DIASTOLIC BLOOD PRESSURE: 82 MMHG | HEART RATE: 73 BPM

## 2023-09-18 DIAGNOSIS — M54.2 ANTERIOR NECK PAIN: ICD-10-CM

## 2023-09-18 DIAGNOSIS — Z87.891 EX-SMOKER: ICD-10-CM

## 2023-09-18 DIAGNOSIS — N40.0 BENIGN PROSTATIC HYPERPLASIA, UNSPECIFIED WHETHER LOWER URINARY TRACT SYMPTOMS PRESENT: ICD-10-CM

## 2023-09-18 DIAGNOSIS — K21.9 GASTROESOPHAGEAL REFLUX DISEASE, UNSPECIFIED WHETHER ESOPHAGITIS PRESENT: ICD-10-CM

## 2023-09-18 DIAGNOSIS — R91.1 SOLITARY PULMONARY NODULE: ICD-10-CM

## 2023-09-18 DIAGNOSIS — R49.0 HOARSE: ICD-10-CM

## 2023-09-18 DIAGNOSIS — R91.8 PULMONARY NODULES: ICD-10-CM

## 2023-09-18 DIAGNOSIS — D22.9 MULTIPLE ATYPICAL SKIN MOLES: ICD-10-CM

## 2023-09-18 DIAGNOSIS — N40.1 BENIGN PROSTATIC HYPERPLASIA WITH URINARY FREQUENCY: ICD-10-CM

## 2023-09-18 DIAGNOSIS — R97.20 ELEVATED PSA: ICD-10-CM

## 2023-09-18 DIAGNOSIS — R35.0 BENIGN PROSTATIC HYPERPLASIA WITH URINARY FREQUENCY: ICD-10-CM

## 2023-09-18 DIAGNOSIS — I25.10 CORONARY ARTERY DISEASE INVOLVING NATIVE CORONARY ARTERY OF NATIVE HEART WITHOUT ANGINA PECTORIS: ICD-10-CM

## 2023-09-18 DIAGNOSIS — N28.1 RENAL CYST: ICD-10-CM

## 2023-09-18 DIAGNOSIS — K76.0 FATTY LIVER: Primary | ICD-10-CM

## 2023-09-18 PROCEDURE — 1126F AMNT PAIN NOTED NONE PRSNT: CPT | Mod: CPTII,S$GLB,, | Performed by: PHYSICIAN ASSISTANT

## 2023-09-18 PROCEDURE — 3008F BODY MASS INDEX DOCD: CPT | Mod: CPTII,S$GLB,, | Performed by: PHYSICIAN ASSISTANT

## 2023-09-18 PROCEDURE — 1157F ADVNC CARE PLAN IN RCRD: CPT | Mod: CPTII,S$GLB,, | Performed by: PHYSICIAN ASSISTANT

## 2023-09-18 PROCEDURE — 1101F PR PT FALLS ASSESS DOC 0-1 FALLS W/OUT INJ PAST YR: ICD-10-PCS | Mod: CPTII,S$GLB,, | Performed by: PHYSICIAN ASSISTANT

## 2023-09-18 PROCEDURE — 99999 PR PBB SHADOW E&M-EST. PATIENT-LVL IV: CPT | Mod: PBBFAC,,, | Performed by: PHYSICIAN ASSISTANT

## 2023-09-18 PROCEDURE — 3288F FALL RISK ASSESSMENT DOCD: CPT | Mod: CPTII,S$GLB,, | Performed by: PHYSICIAN ASSISTANT

## 2023-09-18 PROCEDURE — 1157F PR ADVANCE CARE PLAN OR EQUIV PRESENT IN MEDICAL RECORD: ICD-10-PCS | Mod: CPTII,S$GLB,, | Performed by: PHYSICIAN ASSISTANT

## 2023-09-18 PROCEDURE — 1159F MED LIST DOCD IN RCRD: CPT | Mod: CPTII,S$GLB,, | Performed by: PHYSICIAN ASSISTANT

## 2023-09-18 PROCEDURE — 99999 PR PBB SHADOW E&M-EST. PATIENT-LVL IV: ICD-10-PCS | Mod: PBBFAC,,, | Performed by: PHYSICIAN ASSISTANT

## 2023-09-18 PROCEDURE — 3008F PR BODY MASS INDEX (BMI) DOCUMENTED: ICD-10-PCS | Mod: CPTII,S$GLB,, | Performed by: PHYSICIAN ASSISTANT

## 2023-09-18 PROCEDURE — 99213 OFFICE O/P EST LOW 20 MIN: CPT | Mod: S$GLB,,, | Performed by: PHYSICIAN ASSISTANT

## 2023-09-18 PROCEDURE — 1160F PR REVIEW ALL MEDS BY PRESCRIBER/CLIN PHARMACIST DOCUMENTED: ICD-10-PCS | Mod: CPTII,S$GLB,, | Performed by: PHYSICIAN ASSISTANT

## 2023-09-18 PROCEDURE — 1160F RVW MEDS BY RX/DR IN RCRD: CPT | Mod: CPTII,S$GLB,, | Performed by: PHYSICIAN ASSISTANT

## 2023-09-18 PROCEDURE — 3075F PR MOST RECENT SYSTOLIC BLOOD PRESS GE 130-139MM HG: ICD-10-PCS | Mod: CPTII,S$GLB,, | Performed by: PHYSICIAN ASSISTANT

## 2023-09-18 PROCEDURE — 3288F PR FALLS RISK ASSESSMENT DOCUMENTED: ICD-10-PCS | Mod: CPTII,S$GLB,, | Performed by: PHYSICIAN ASSISTANT

## 2023-09-18 PROCEDURE — 3075F SYST BP GE 130 - 139MM HG: CPT | Mod: CPTII,S$GLB,, | Performed by: PHYSICIAN ASSISTANT

## 2023-09-18 PROCEDURE — 1126F PR PAIN SEVERITY QUANTIFIED, NO PAIN PRESENT: ICD-10-PCS | Mod: CPTII,S$GLB,, | Performed by: PHYSICIAN ASSISTANT

## 2023-09-18 PROCEDURE — 1101F PT FALLS ASSESS-DOCD LE1/YR: CPT | Mod: CPTII,S$GLB,, | Performed by: PHYSICIAN ASSISTANT

## 2023-09-18 PROCEDURE — 3079F PR MOST RECENT DIASTOLIC BLOOD PRESSURE 80-89 MM HG: ICD-10-PCS | Mod: CPTII,S$GLB,, | Performed by: PHYSICIAN ASSISTANT

## 2023-09-18 PROCEDURE — 99213 PR OFFICE/OUTPT VISIT, EST, LEVL III, 20-29 MIN: ICD-10-PCS | Mod: S$GLB,,, | Performed by: PHYSICIAN ASSISTANT

## 2023-09-18 PROCEDURE — 3079F DIAST BP 80-89 MM HG: CPT | Mod: CPTII,S$GLB,, | Performed by: PHYSICIAN ASSISTANT

## 2023-09-18 PROCEDURE — 1159F PR MEDICATION LIST DOCUMENTED IN MEDICAL RECORD: ICD-10-PCS | Mod: CPTII,S$GLB,, | Performed by: PHYSICIAN ASSISTANT

## 2023-09-18 RX ORDER — LEVOCETIRIZINE DIHYDROCHLORIDE 5 MG/1
5 TABLET, FILM COATED ORAL NIGHTLY
Qty: 30 TABLET | Refills: 1 | Status: SHIPPED | OUTPATIENT
Start: 2023-09-18 | End: 2024-09-17

## 2023-09-18 RX ORDER — FLUTICASONE PROPIONATE 50 MCG
1 SPRAY, SUSPENSION (ML) NASAL DAILY
Qty: 18.2 ML | Refills: 1 | Status: SHIPPED | OUTPATIENT
Start: 2023-09-18 | End: 2023-10-18

## 2023-09-18 NOTE — PROGRESS NOTES
Subjective:      Patient ID: Olivier Cuenca is a 72 y.o. male.    Chief Complaint: Follow-up    HPI  Here today for a routine follow up for his medical problems.   Seeing urology for elevated PSA, bph, renal cysts. Monitoring for now.   Labs completed last month will be reviewed.   Fatty liver on ultrasound. Changed diet. Lost 15 lbs.   Chronic globus sensation and chronic hoarseness. Very concerning to him. A lot of phlegm and frequently clearing his throat. Has seen specialist in the past including voice center at [facility]. Everything normal. Scope showed a lot of dryness. Has increased fluid intake  but still having the symptoms. Dull pain on and off. He feels it when he moves his neck a certain way. Constantly clearing his throat.  Hasn't tried going on antacid or flonase/antihistamine.   Pt would also like his lung nodules checked again. Requesting imaging. History of smoking. Denies a cough.   Overdue to see derm for skin cancer check.   Patient Active Problem List   Diagnosis    Fatty liver    Erectile dysfunction    BPH (benign prostatic hyperplasia)    Personal history of colonic polyps    Poliomyelitis osteopathy of left lower leg    Neck pain    DDD (degenerative disc disease), cervical    GERD (gastroesophageal reflux disease)    Screen for colon cancer    Colon cancer screening    Herpes simplex    CAD (coronary artery disease)    Lesions of spleen- possible cysts or hemangiomas.    Pulmonary nodules    Renal cyst    New onset right Mid back pain    Chronic Hoarseness     Dependence on other enabling machines and devices    Abnormality of gait and mobility    Ex-smoker    Tinnitus    Benign prostatic hyperplasia with urinary frequency    Elevated PSA         Current Outpatient Medications:     tadalafiL (CIALIS) 5 MG tablet, Take 1 tablet (5 mg total) by mouth once daily. **May Cause Dizziness** *DO NOT TAKE WITH NITRATE *, Disp: 30 tablet, Rfl: 11    fluticasone propionate (FLONASE) 50 mcg/actuation  "nasal spray, 1 spray (50 mcg total) by Each Nostril route once daily., Disp: 18.2 mL, Rfl: 1    GENERIC EXTERNAL MEDICATION, 1 Dose 2 (two) times a day. Substance Health - Similar to Nature balance vegetable supplement, Disp: , Rfl:     levocetirizine (XYZAL) 5 MG tablet, Take 1 tablet (5 mg total) by mouth every evening., Disp: 30 tablet, Rfl: 1    Review of Systems   Constitutional:  Negative for activity change, appetite change, chills, diaphoresis, fatigue, fever and unexpected weight change.   HENT:  Positive for postnasal drip, rhinorrhea and voice change. Negative for congestion, hearing loss, sore throat, tinnitus and trouble swallowing.    Eyes: Negative.  Negative for visual disturbance.   Respiratory: Negative.  Negative for cough, choking, chest tightness and shortness of breath.    Cardiovascular:  Negative for chest pain, palpitations and leg swelling.   Gastrointestinal:  Negative for abdominal distention, abdominal pain, blood in stool, constipation, diarrhea, nausea and vomiting.   Endocrine: Negative for cold intolerance, heat intolerance, polydipsia and polyuria.   Genitourinary: Negative.  Negative for difficulty urinating and frequency.   Musculoskeletal:  Positive for neck pain. Negative for arthralgias, back pain, gait problem, joint swelling, myalgias and neck stiffness.   Skin:  Negative for color change, pallor, rash and wound.   Neurological:  Negative for dizziness, tremors, weakness, light-headedness, numbness and headaches.   Hematological:  Negative for adenopathy.   Psychiatric/Behavioral:  Negative for behavioral problems, confusion, self-injury, sleep disturbance and suicidal ideas. The patient is not nervous/anxious.      Objective:   /82 (BP Location: Left arm, Patient Position: Sitting, BP Method: Large (Manual))   Pulse 73   Temp 97.3 °F (36.3 °C) (Tympanic)   Resp 16   Ht 5' 9" (1.753 m)   Wt 84.8 kg (186 lb 15.2 oz)   SpO2 95%   BMI 27.61 kg/m²     Physical " Exam  Vitals reviewed.   Constitutional:       General: He is not in acute distress.     Appearance: Normal appearance. He is well-developed. He is not ill-appearing, toxic-appearing or diaphoretic.   HENT:      Head: Normocephalic and atraumatic.      Right Ear: External ear normal.      Left Ear: External ear normal.      Nose: Nose normal.   Eyes:      Conjunctiva/sclera: Conjunctivae normal.      Pupils: Pupils are equal, round, and reactive to light.   Neck:      Thyroid: No thyroid mass, thyromegaly or thyroid tenderness.     Cardiovascular:      Rate and Rhythm: Normal rate and regular rhythm.      Heart sounds: Normal heart sounds. No murmur heard.     No friction rub. No gallop.   Pulmonary:      Effort: Pulmonary effort is normal. No respiratory distress.      Breath sounds: Normal breath sounds. No wheezing or rales.   Chest:      Chest wall: No tenderness.   Abdominal:      General: There is no distension.      Palpations: Abdomen is soft.      Tenderness: There is no abdominal tenderness.   Musculoskeletal:         General: Normal range of motion.      Cervical back: Normal range of motion and neck supple. No edema, erythema, signs of trauma, rigidity, torticollis or crepitus. Pain with movement present. Normal range of motion.   Lymphadenopathy:      Cervical: No cervical adenopathy.   Skin:     General: Skin is warm and dry.      Capillary Refill: Capillary refill takes less than 2 seconds.      Findings: No rash.   Neurological:      Mental Status: He is alert and oriented to person, place, and time.      Motor: No weakness.      Coordination: Coordination normal.      Gait: Gait normal.   Psychiatric:         Mood and Affect: Mood normal.         Behavior: Behavior normal.         Thought Content: Thought content normal.         Judgment: Judgment normal.       Office Visit on 09/06/2023   Component Date Value Ref Range Status    POC Residual Urine Volume 09/06/2023 7  0 - 100 mL Final    Color, UA  09/06/2023 Yellow   Final    pH, UA 09/06/2023 6.0   Final    WBC, UA 09/06/2023 neg   Final    Nitrite, UA 09/06/2023 neg   Final    Protein, POC 09/06/2023 neg   Final    Glucose, UA 09/06/2023 neg   Final    Ketones, UA 09/06/2023 neg   Final    Urobilinogen, UA 09/06/2023 0.2   Final    Bilirubin, POC 09/06/2023 neg   Final    Blood, UA 09/06/2023 neg   Final    Clarity, UA 09/06/2023 Clear   Final     Office Visit on 09/06/2023   Component Date Value Ref Range Status    POC Residual Urine Volume 09/06/2023 7  0 - 100 mL Final    Color, UA 09/06/2023 Yellow   Final    pH, UA 09/06/2023 6.0   Final    WBC, UA 09/06/2023 neg   Final    Nitrite, UA 09/06/2023 neg   Final    Protein, POC 09/06/2023 neg   Final    Glucose, UA 09/06/2023 neg   Final    Ketones, UA 09/06/2023 neg   Final    Urobilinogen, UA 09/06/2023 0.2   Final    Bilirubin, POC 09/06/2023 neg   Final    Blood, UA 09/06/2023 neg   Final    Clarity, UA 09/06/2023 Clear   Final   Lab Visit on 08/01/2023   Component Date Value Ref Range Status    Cholesterol 08/01/2023 127  120 - 199 mg/dL Final    Comment: The National Cholesterol Education Program (NCEP) has set the  following guidelines (reference ranges) for Cholesterol:  Optimal.....................<200 mg/dL  Borderline High.............200-239 mg/dL  High........................> or = 240 mg/dL      Triglycerides 08/01/2023 96  30 - 150 mg/dL Final    Comment: The National Cholesterol Education Program (NCEP) has set the  following guidelines (reference values) for triglycerides:  Normal......................<150 mg/dL  Borderline High.............150-199 mg/dL  High........................200-499 mg/dL      HDL 08/01/2023 36 (L)  40 - 75 mg/dL Final    Comment: The National Cholesterol Education Program (NCEP) has set the  following guidelines (reference values) for HDL Cholesterol:  Low...............<40 mg/dL  Optimal...........>60 mg/dL      LDL Cholesterol 08/01/2023 71.8  63.0 - 159.0 mg/dL  Final    Comment: The National Cholesterol Education Program (NCEP) has set the  following guidelines (reference values) for LDL Cholesterol:  Optimal.......................<130 mg/dL  Borderline High...............130-159 mg/dL  High..........................160-189 mg/dL  Very High.....................>190 mg/dL      HDL/Cholesterol Ratio 08/01/2023 28.3  20.0 - 50.0 % Final    Total Cholesterol/HDL Ratio 08/01/2023 3.5  2.0 - 5.0 Final    Non-HDL Cholesterol 08/01/2023 91  mg/dL Final    Comment: Risk category and Non-HDL cholesterol goals:  Coronary heart disease (CHD)or equivalent (10-year risk of CHD >20%):  Non-HDL cholesterol goal     <130 mg/dL  Two or more CHD risk factors and 10-year risk of CHD <= 20%:  Non-HDL cholesterol goal     <160 mg/dL  0 to 1 CHD risk factor:  Non-HDL cholesterol goal     <190 mg/dL      Sodium 08/01/2023 142  136 - 145 mmol/L Final    Potassium 08/01/2023 4.9  3.5 - 5.1 mmol/L Final    Chloride 08/01/2023 104  95 - 110 mmol/L Final    CO2 08/01/2023 27  23 - 29 mmol/L Final    Glucose 08/01/2023 91  70 - 110 mg/dL Final    BUN 08/01/2023 14  8 - 23 mg/dL Final    Creatinine 08/01/2023 0.8  0.5 - 1.4 mg/dL Final    Calcium 08/01/2023 9.4  8.7 - 10.5 mg/dL Final    Total Protein 08/01/2023 7.4  6.0 - 8.4 g/dL Final    Albumin 08/01/2023 4.0  3.5 - 5.2 g/dL Final    Total Bilirubin 08/01/2023 0.8  0.1 - 1.0 mg/dL Final    Comment: For infants and newborns, interpretation of results should be based  on gestational age, weight and in agreement with clinical  observations.    Premature Infant recommended reference ranges:  Up to 24 hours.............<8.0 mg/dL  Up to 48 hours............<12.0 mg/dL  3-5 days..................<15.0 mg/dL  6-29 days.................<15.0 mg/dL      Alkaline Phosphatase 08/01/2023 100  55 - 135 U/L Final    AST 08/01/2023 24  10 - 40 U/L Final    ALT 08/01/2023 26  10 - 44 U/L Final    eGFR 08/01/2023 >60.0  >60 mL/min/1.73 m^2 Final    Anion Gap  08/01/2023 11  8 - 16 mmol/L Final    PSA, Screen 08/01/2023 4.3 (H)  0.00 - 4.00 ng/mL Final    Comment: The testing method is a chemiluminescent microparticle immunoassay   manufactured by [employer] and performed on the [employer]   or   [employer] system. Values obtained with different assay manufacturers   for   methods may be different and cannot be used interchangeably.  PSA Expected levels:  Hormonal Therapy: <0.05 ng/ml  Prostatectomy: <0.01 ng/ml  Radiation Therapy: <1.00 ng/ml      WBC 08/01/2023 5.44  3.90 - 12.70 K/uL Final    RBC 08/01/2023 5.36  4.60 - 6.20 M/uL Final    Hemoglobin 08/01/2023 16.3  14.0 - 18.0 g/dL Final    Hematocrit 08/01/2023 49.8  40.0 - 54.0 % Final    MCV 08/01/2023 93  82 - 98 fL Final    MCH 08/01/2023 30.4  27.0 - 31.0 pg Final    MCHC 08/01/2023 32.7  32.0 - 36.0 g/dL Final    RDW 08/01/2023 13.2  11.5 - 14.5 % Final    Platelets 08/01/2023 183  150 - 450 K/uL Final    MPV 08/01/2023 12.2  9.2 - 12.9 fL Final    Immature Granulocytes 08/01/2023 0.4  0.0 - 0.5 % Final    Gran # (ANC) 08/01/2023 3.3  1.8 - 7.7 K/uL Final    Immature Grans (Abs) 08/01/2023 0.02  0.00 - 0.04 K/uL Final    Comment: Mild elevation in immature granulocytes is non specific and   can be seen in a variety of conditions including stress response,   acute inflammation, trauma and pregnancy. Correlation with other   laboratory and clinical findings is essential.      Lymph # 08/01/2023 1.4  1.0 - 4.8 K/uL Final    Mono # 08/01/2023 0.6  0.3 - 1.0 K/uL Final    Eos # 08/01/2023 0.1  0.0 - 0.5 K/uL Final    Baso # 08/01/2023 0.03  0.00 - 0.20 K/uL Final    nRBC 08/01/2023 0  0 /100 WBC Final    Gran % 08/01/2023 60.9  38.0 - 73.0 % Final    Lymph % 08/01/2023 25.9  18.0 - 48.0 % Final    Mono % 08/01/2023 10.5  4.0 - 15.0 % Final    Eosinophil % 08/01/2023 1.7  0.0 - 8.0 % Final    Basophil % 08/01/2023 0.6  0.0 - 1.9 % Final    Differential Method 08/01/2023 Automated   Final      Assessment:      1. Fatty liver    2. Benign prostatic hyperplasia, unspecified whether lower urinary tract symptoms present    3. Gastroesophageal reflux disease, unspecified whether esophagitis present    4. Coronary artery disease involving native coronary artery of native heart without angina pectoris    5. Benign prostatic hyperplasia with urinary frequency    6. Elevated PSA    7. Ex-smoker    8. Renal cyst    9. Pulmonary nodules    10. Multiple atypical skin moles    11. Chronic Hoarseness     12. Anterior neck pain      Plan:   Fatty liver    Benign prostatic hyperplasia, unspecified whether lower urinary tract symptoms present    Gastroesophageal reflux disease, unspecified whether esophagitis present    Coronary artery disease involving native coronary artery of native heart without angina pectoris    Benign prostatic hyperplasia with urinary frequency    Elevated PSA    Ex-smoker    Renal cyst    Pulmonary nodules    Multiple atypical skin moles  -     Ambulatory referral/consult to Dermatology; Future; Expected date: 09/25/2023    Chronic Hoarseness   -     US Soft Tissue Head Neck Thyroid; Future; Expected date: 09/18/2023  -     fluticasone propionate (FLONASE) 50 mcg/actuation nasal spray; 1 spray (50 mcg total) by Each Nostril route once daily.  Dispense: 18.2 mL; Refill: 1  -     levocetirizine (XYZAL) 5 MG tablet; Take 1 tablet (5 mg total) by mouth every evening.  Dispense: 30 tablet; Refill: 1    Anterior neck pain  -     US Soft Tissue Head Neck Thyroid; Future; Expected date: 09/18/2023    -check ultrasound on area of tenderness.   -trial of antihistamine/flonase for a month to see if any improvement on hoarseness      Follow up if symptoms worsen or fail to improve.

## 2023-09-19 ENCOUNTER — HOSPITAL ENCOUNTER (OUTPATIENT)
Dept: RADIOLOGY | Facility: HOSPITAL | Age: 73
Discharge: HOME | End: 2023-09-19
Attending: PHYSICIAN ASSISTANT
Payer: MEDICARE

## 2023-09-19 DIAGNOSIS — R49.0 HOARSE: ICD-10-CM

## 2023-09-19 DIAGNOSIS — M54.2 ANTERIOR NECK PAIN: ICD-10-CM

## 2023-09-19 PROCEDURE — 76536 US EXAM OF HEAD AND NECK: CPT | Mod: TC

## 2023-09-19 PROCEDURE — 76536 US SOFT TISSUE HEAD NECK THYROID: ICD-10-PCS | Mod: 26,,, | Performed by: RADIOLOGY

## 2023-09-19 PROCEDURE — 76536 US EXAM OF HEAD AND NECK: CPT | Mod: 26,,, | Performed by: RADIOLOGY

## 2023-09-25 ENCOUNTER — HOSPITAL ENCOUNTER (OUTPATIENT)
Dept: RADIOLOGY | Facility: HOSPITAL | Age: 73
Discharge: HOME | End: 2023-09-25
Attending: PHYSICIAN ASSISTANT
Payer: MEDICARE

## 2023-09-25 DIAGNOSIS — R91.8 PULMONARY NODULES: ICD-10-CM

## 2023-09-25 DIAGNOSIS — R91.1 SOLITARY PULMONARY NODULE: ICD-10-CM

## 2023-09-25 PROCEDURE — 71250 CT CHEST WITHOUT CONTRAST: ICD-10-PCS | Mod: 26,,, | Performed by: RADIOLOGY

## 2023-09-25 PROCEDURE — 71250 CT THORAX DX C-: CPT | Mod: TC

## 2023-09-25 PROCEDURE — 71250 CT THORAX DX C-: CPT | Mod: 26,,, | Performed by: RADIOLOGY

## 2023-10-18 ENCOUNTER — OFFICE VISIT (OUTPATIENT)
Dept: DERMATOLOGY | Facility: CLINIC | Age: 73
End: 2023-10-18
Payer: MEDICARE

## 2023-10-18 DIAGNOSIS — Z12.83 SCREENING, MALIGNANT NEOPLASM, SKIN: ICD-10-CM

## 2023-10-18 DIAGNOSIS — D22.9 MULTIPLE BENIGN NEVI: ICD-10-CM

## 2023-10-18 DIAGNOSIS — D18.01 CHERRY ANGIOMA: ICD-10-CM

## 2023-10-18 DIAGNOSIS — D22.9 MULTIPLE ATYPICAL SKIN MOLES: ICD-10-CM

## 2023-10-18 DIAGNOSIS — L82.1 SEBORRHEIC KERATOSES: Primary | ICD-10-CM

## 2023-10-18 PROCEDURE — 1160F PR REVIEW ALL MEDS BY PRESCRIBER/CLIN PHARMACIST DOCUMENTED: ICD-10-PCS | Mod: CPTII,S$GLB,, | Performed by: STUDENT IN AN ORGANIZED HEALTH CARE EDUCATION/TRAINING PROGRAM

## 2023-10-18 PROCEDURE — 1157F PR ADVANCE CARE PLAN OR EQUIV PRESENT IN MEDICAL RECORD: ICD-10-PCS | Mod: CPTII,S$GLB,, | Performed by: STUDENT IN AN ORGANIZED HEALTH CARE EDUCATION/TRAINING PROGRAM

## 2023-10-18 PROCEDURE — 1160F RVW MEDS BY RX/DR IN RCRD: CPT | Mod: CPTII,S$GLB,, | Performed by: STUDENT IN AN ORGANIZED HEALTH CARE EDUCATION/TRAINING PROGRAM

## 2023-10-18 PROCEDURE — 99213 PR OFFICE/OUTPT VISIT, EST, LEVL III, 20-29 MIN: ICD-10-PCS | Mod: S$GLB,,, | Performed by: STUDENT IN AN ORGANIZED HEALTH CARE EDUCATION/TRAINING PROGRAM

## 2023-10-18 PROCEDURE — 99213 OFFICE O/P EST LOW 20 MIN: CPT | Mod: S$GLB,,, | Performed by: STUDENT IN AN ORGANIZED HEALTH CARE EDUCATION/TRAINING PROGRAM

## 2023-10-18 PROCEDURE — 1157F ADVNC CARE PLAN IN RCRD: CPT | Mod: CPTII,S$GLB,, | Performed by: STUDENT IN AN ORGANIZED HEALTH CARE EDUCATION/TRAINING PROGRAM

## 2023-10-18 PROCEDURE — 1126F PR PAIN SEVERITY QUANTIFIED, NO PAIN PRESENT: ICD-10-PCS | Mod: CPTII,S$GLB,, | Performed by: STUDENT IN AN ORGANIZED HEALTH CARE EDUCATION/TRAINING PROGRAM

## 2023-10-18 PROCEDURE — 1159F PR MEDICATION LIST DOCUMENTED IN MEDICAL RECORD: ICD-10-PCS | Mod: CPTII,S$GLB,, | Performed by: STUDENT IN AN ORGANIZED HEALTH CARE EDUCATION/TRAINING PROGRAM

## 2023-10-18 PROCEDURE — 99999 PR PBB SHADOW E&M-EST. PATIENT-LVL III: ICD-10-PCS | Mod: PBBFAC,,, | Performed by: STUDENT IN AN ORGANIZED HEALTH CARE EDUCATION/TRAINING PROGRAM

## 2023-10-18 PROCEDURE — 1159F MED LIST DOCD IN RCRD: CPT | Mod: CPTII,S$GLB,, | Performed by: STUDENT IN AN ORGANIZED HEALTH CARE EDUCATION/TRAINING PROGRAM

## 2023-10-18 PROCEDURE — 1126F AMNT PAIN NOTED NONE PRSNT: CPT | Mod: CPTII,S$GLB,, | Performed by: STUDENT IN AN ORGANIZED HEALTH CARE EDUCATION/TRAINING PROGRAM

## 2023-10-18 PROCEDURE — 99999 PR PBB SHADOW E&M-EST. PATIENT-LVL III: CPT | Mod: PBBFAC,,, | Performed by: STUDENT IN AN ORGANIZED HEALTH CARE EDUCATION/TRAINING PROGRAM

## 2023-10-18 NOTE — PROGRESS NOTES
Subjective:       Patient ID:  Olivier Cuenca is a 72 y.o. male who presents for   Chief Complaint   Patient presents with    Spot    Mole     Pt coming in for skin check, moles over body.     Mildly atypical nevus of the midline upper back and right upper back (s/p biopsy on 3/8/18), and multiple nevi, last seen by Dr. Hooper on 8/18/22. Doing well. Reports noticing several new, brownish and crusted growths on the back and some on the arms; no treatments.  Denies bleeding, tender, growing, or concerning lesions.     Spot    Mole        Review of Systems   Constitutional:  Negative for fever and chills.   Skin:  Negative for itching, rash and dry skin.        Objective:    Physical Exam   Constitutional: He appears well-developed and well-nourished. No distress.   Neurological: He is alert and oriented to person, place, and time. He is not disoriented.   Psychiatric: He has a normal mood and affect.   Skin:   Areas Examined (abnormalities noted in diagram):   Head / Face Inspection Performed  Neck Inspection Performed  Chest / Axilla Inspection Performed  Abdomen Inspection Performed  Back Inspection Performed  RUE Inspected  LUE Inspection Performed  Nails and Digits Inspection Performed                   Diagram Legend     Erythematous scaling macule/papule c/w actinic keratosis       Vascular papule c/w angioma      Pigmented verrucoid papule/plaque c/w seborrheic keratosis      Yellow umbilicated papule c/w sebaceous hyperplasia      Irregularly shaped tan macule c/w lentigo     1-2 mm smooth white papules consistent with Milia      Movable subcutaneous cyst with punctum c/w epidermal inclusion cyst      Subcutaneous movable cyst c/w pilar cyst      Firm pink to brown papule c/w dermatofibroma      Pedunculated fleshy papule(s) c/w skin tag(s)      Evenly pigmented macule c/w junctional nevus     Mildly variegated pigmented, slightly irregular-bordered macule c/w mildly atypical nevus      Flesh colored to  evenly pigmented papule c/w intradermal nevus       Pink pearly papule/plaque c/w basal cell carcinoma      Erythematous hyperkeratotic cursted plaque c/w SCC      Surgical scar with no sign of skin cancer recurrence      Open and closed comedones      Inflammatory papules and pustules      Verrucoid papule consistent consistent with wart     Erythematous eczematous patches and plaques     Dystrophic onycholytic nail with subungual debris c/w onychomycosis     Umbilicated papule    Erythematous-base heme-crusted tan verrucoid plaque consistent with inflamed seborrheic keratosis     Erythematous Silvery Scaling Plaque c/w Psoriasis     See annotation      Assessment / Plan:        Seborrheic keratoses  These are benign inherited growths without a malignant potential. Reassurance given to patient. No treatment is necessary.     Cherry angioma  This is a benign vascular lesion. Reassurance given. No treatment required.     Multiple benign nevi  Screening, malignant neoplasm, skin  upper body skin examination performed today including at least 6 points as noted in physical examination. No lesions suspicious for malignancy noted.  Reassurance provided.    Instructed patient to observe lesion(s) for changes and follow up in clinic if changes are noted. Patient to monitor skin at home for new or changing lesions and follow up in clinic if noted.    Discussed ABCDE's of moles and brochure provided.             No follow-ups on file.

## 2023-11-22 ENCOUNTER — OFFICE VISIT (OUTPATIENT)
Dept: UROLOGY | Facility: CLINIC | Age: 73
End: 2023-11-22
Payer: MEDICARE

## 2023-11-22 ENCOUNTER — LAB VISIT (OUTPATIENT)
Dept: LAB | Facility: HOSPITAL | Age: 73
End: 2023-11-22
Attending: UROLOGY
Payer: MEDICARE

## 2023-11-22 VITALS
HEART RATE: 69 BPM | BODY MASS INDEX: 27.61 KG/M2 | SYSTOLIC BLOOD PRESSURE: 143 MMHG | DIASTOLIC BLOOD PRESSURE: 86 MMHG | WEIGHT: 186.94 LBS

## 2023-11-22 DIAGNOSIS — R31.0 GROSS HEMATURIA: ICD-10-CM

## 2023-11-22 DIAGNOSIS — R35.0 BENIGN PROSTATIC HYPERPLASIA WITH URINARY FREQUENCY: Primary | ICD-10-CM

## 2023-11-22 DIAGNOSIS — R97.20 ELEVATED PSA: ICD-10-CM

## 2023-11-22 DIAGNOSIS — N40.1 BENIGN PROSTATIC HYPERPLASIA WITH URINARY FREQUENCY: Primary | ICD-10-CM

## 2023-11-22 DIAGNOSIS — N28.1 RENAL CYST: ICD-10-CM

## 2023-11-22 LAB
BACTERIA #/AREA URNS AUTO: NORMAL /HPF
CREAT SERPL-MCNC: 0.9 MG/DL (ref 0.5–1.4)
EST. GFR  (NO RACE VARIABLE): >60 ML/MIN/1.73 M^2
MICROSCOPIC COMMENT: NORMAL
RBC #/AREA URNS AUTO: 0 /HPF (ref 0–4)
WBC #/AREA URNS AUTO: 0 /HPF (ref 0–5)

## 2023-11-22 PROCEDURE — 1157F PR ADVANCE CARE PLAN OR EQUIV PRESENT IN MEDICAL RECORD: ICD-10-PCS | Mod: CPTII,S$GLB,, | Performed by: UROLOGY

## 2023-11-22 PROCEDURE — 99999 PR PBB SHADOW E&M-EST. PATIENT-LVL II: ICD-10-PCS | Mod: PBBFAC,,, | Performed by: UROLOGY

## 2023-11-22 PROCEDURE — 3008F BODY MASS INDEX DOCD: CPT | Mod: CPTII,S$GLB,, | Performed by: UROLOGY

## 2023-11-22 PROCEDURE — 3288F PR FALLS RISK ASSESSMENT DOCUMENTED: ICD-10-PCS | Mod: CPTII,S$GLB,, | Performed by: UROLOGY

## 2023-11-22 PROCEDURE — 3079F PR MOST RECENT DIASTOLIC BLOOD PRESSURE 80-89 MM HG: ICD-10-PCS | Mod: CPTII,S$GLB,, | Performed by: UROLOGY

## 2023-11-22 PROCEDURE — 1101F PT FALLS ASSESS-DOCD LE1/YR: CPT | Mod: CPTII,S$GLB,, | Performed by: UROLOGY

## 2023-11-22 PROCEDURE — 88112 CYTOPATH CELL ENHANCE TECH: CPT | Mod: 26,,, | Performed by: PATHOLOGY

## 2023-11-22 PROCEDURE — 3288F FALL RISK ASSESSMENT DOCD: CPT | Mod: CPTII,S$GLB,, | Performed by: UROLOGY

## 2023-11-22 PROCEDURE — 3008F PR BODY MASS INDEX (BMI) DOCUMENTED: ICD-10-PCS | Mod: CPTII,S$GLB,, | Performed by: UROLOGY

## 2023-11-22 PROCEDURE — 88112 CYTOPATH CELL ENHANCE TECH: CPT | Performed by: PATHOLOGY

## 2023-11-22 PROCEDURE — 3079F DIAST BP 80-89 MM HG: CPT | Mod: CPTII,S$GLB,, | Performed by: UROLOGY

## 2023-11-22 PROCEDURE — 82565 ASSAY OF CREATININE: CPT | Performed by: UROLOGY

## 2023-11-22 PROCEDURE — 81001 URINALYSIS AUTO W/SCOPE: CPT | Performed by: UROLOGY

## 2023-11-22 PROCEDURE — 1101F PR PT FALLS ASSESS DOC 0-1 FALLS W/OUT INJ PAST YR: ICD-10-PCS | Mod: CPTII,S$GLB,, | Performed by: UROLOGY

## 2023-11-22 PROCEDURE — 3077F PR MOST RECENT SYSTOLIC BLOOD PRESSURE >= 140 MM HG: ICD-10-PCS | Mod: CPTII,S$GLB,, | Performed by: UROLOGY

## 2023-11-22 PROCEDURE — 1160F RVW MEDS BY RX/DR IN RCRD: CPT | Mod: CPTII,S$GLB,, | Performed by: UROLOGY

## 2023-11-22 PROCEDURE — 3077F SYST BP >= 140 MM HG: CPT | Mod: CPTII,S$GLB,, | Performed by: UROLOGY

## 2023-11-22 PROCEDURE — 99214 PR OFFICE/OUTPT VISIT, EST, LEVL IV, 30-39 MIN: ICD-10-PCS | Mod: S$GLB,,, | Performed by: UROLOGY

## 2023-11-22 PROCEDURE — 1157F ADVNC CARE PLAN IN RCRD: CPT | Mod: CPTII,S$GLB,, | Performed by: UROLOGY

## 2023-11-22 PROCEDURE — 1159F PR MEDICATION LIST DOCUMENTED IN MEDICAL RECORD: ICD-10-PCS | Mod: CPTII,S$GLB,, | Performed by: UROLOGY

## 2023-11-22 PROCEDURE — 36415 COLL VENOUS BLD VENIPUNCTURE: CPT | Performed by: UROLOGY

## 2023-11-22 PROCEDURE — 99214 OFFICE O/P EST MOD 30 MIN: CPT | Mod: S$GLB,,, | Performed by: UROLOGY

## 2023-11-22 PROCEDURE — 99999 PR PBB SHADOW E&M-EST. PATIENT-LVL II: CPT | Mod: PBBFAC,,, | Performed by: UROLOGY

## 2023-11-22 PROCEDURE — 88112 PR  CYTOPATH, CELL ENHANCE TECH: ICD-10-PCS | Mod: 26,,, | Performed by: PATHOLOGY

## 2023-11-22 PROCEDURE — 1160F PR REVIEW ALL MEDS BY PRESCRIBER/CLIN PHARMACIST DOCUMENTED: ICD-10-PCS | Mod: CPTII,S$GLB,, | Performed by: UROLOGY

## 2023-11-22 PROCEDURE — 1159F MED LIST DOCD IN RCRD: CPT | Mod: CPTII,S$GLB,, | Performed by: UROLOGY

## 2023-11-22 RX ORDER — TAMSULOSIN HYDROCHLORIDE 0.4 MG/1
0.4 CAPSULE ORAL DAILY
Qty: 90 CAPSULE | Refills: 3 | Status: SHIPPED | OUTPATIENT
Start: 2023-11-22 | End: 2024-11-21

## 2023-11-22 RX ORDER — TADALAFIL 5 MG/1
TABLET ORAL
Qty: 30 TABLET | Refills: 11 | Status: SHIPPED | OUTPATIENT
Start: 2023-11-22

## 2023-11-22 NOTE — PROGRESS NOTES
Chief Complaint:   Encounter Diagnoses   Name Primary?    Benign prostatic hyperplasia with urinary frequency Yes    Elevated PSA     Renal cyst        HPI:    11/22/23- patient noted a 1 time occurrence of gross hematuria, no evidence of recurrence since that time.  He was also having some dysuria immediately but this immediately went away as well.  Cialis is not controlling his flow is well as it had in the past would like to change this.  He does have a history of smoking.    72-year-old gentleman who comes in for re-evaluation of bilateral renal cysts.  In addition patient has BPH which he treats with Cialis 5 mg usually about twice per week.  He states that when he does take it symptoms definitely.  No gross hematuria, no microscopic hematuria, he is never been a smoker.  He has a weak stream, without the Cialis with it indefinitely improves it.  He does have increased frequency and urgency, no leakage, gets up 4 times per evening, known stable bilateral cysts.  PSA does appear to be rising, last check though was last summer.  No other urological history.  No family history of urological cancers or stones, except for prostate cancer in his father but diagnosed after he was 80.    Allergies:  Codeine and Codeine sulfate    Medications:  See MAR    Review of Systems:  General: No fever, chills, fatigability, or weight loss.  Skin: No rashes, itching, or changes in color or texture of skin.  Chest: Denies KAN, cyanosis, wheezing, cough, and sputum production.  Abdomen: Appetite fine. No weight loss. Denies diarrhea, abdominal pain, hematemesis, or blood in stool.  Musculoskeletal: No joint stiffness or swelling. Denies back pain.  : As above.  All other review of systems negative.    PMH:   has a past medical history of Bilateral renal cysts (5/29/2023), BPH (benign prostatic hyperplasia), CAD (coronary artery disease) (2022), CAD (coronary artery disease) (2022), Colon polyp, Erectile dysfunction, Ex-smoker,  Fatty liver, Herpes simplex, History of poliomyelitis, Migraine, and Thalassemia minor.    PSH:   has a past surgical history that includes Cholecystectomy; Appendectomy; Hernia repair; Colonoscopy (N/A, 2/28/2018); Femur fracture surgery (02/2020); Colonoscopy (N/A, 6/10/2021); Left heart catheterization (Left, 10/7/2022); and Coronary angiography (N/A, 10/7/2022).    FamHx: family history includes Dementia in his mother; Hyperlipidemia in his father.    SocHx:  reports that he quit smoking about 38 years ago. His smoking use included cigarettes. He started smoking about 59 years ago. He has quit using smokeless tobacco. He reports current drug use. Drug: Marijuana. He reports that he does not drink alcohol.      Physical Exam:  There were no vitals filed for this visit.    General: A&Ox3, no apparent distress, no deformities  Neck: No masses, normal ROM  Lungs: normal inspiration, no use of accessory muscles  Heart: normal pulse, no arrhythmias  Abdomen: Soft, NT, ND, no masses, no hernias, no hepatosplenomegaly  Skin: The skin is warm and dry. No jaundice.  Ext: No c/c/e.  VAIBHAV: 6/23- Normal rectal tone. Prost 25 gm no nodules or masses appreciated. SV not palpable. Perineum and anus normal.    Labs/Studies:   UA normal 9/23  PVR 7 ml 9/23  PSA 4.3 8/23  PSA 3.2 7/22   PSA 2.2 5/21  PSA 1.6 11/19  Creatinine 0.8 10/22   HENRRY right renal cyst 1.1 cm, left renal cyst 3.7 cm, stable 6/23  CT with contrast bilateral renal cysts 3/18    Impression/Plan:       1. BPH- Cialis 5 while working well for the erections does not control his flow.  Therefore will initiate a tamsulosin trial, he knows that this can cause orthostatic hypotension or dizziness.  If he has any issues he will stop the medication and contact our office.  He is also aware that he will now take the Cialis on a p.r.n. basis of either 5 or 10 mg, and not to take within 12 hours of the tamsulosin.  Reassess effectiveness at the cystoscopy appointment.  Call  with any issues prior to the next appointment.     2. Elevated PSA- PSA had been rising, will repeat prior to the cystoscopy, do not want to pursue today as it could be falsely positive.  Patient would rather an MRI over a biopsy if warranted.      3. Renal cysts- this appear to be stable, we can follow him expectantly.    4. Gross hematuria- patient stated that this happened once before and had a workup which was negative.  Does have a history of smoking, therefore proceed with a structural evaluation.  UA and cytology today, proceed with a CT urogram and have him return for cystoscopy.

## 2023-11-24 LAB
FINAL PATHOLOGIC DIAGNOSIS: NORMAL
Lab: NORMAL

## 2023-11-27 ENCOUNTER — OFFICE VISIT (OUTPATIENT)
Dept: CARDIOLOGY | Facility: CLINIC | Age: 73
End: 2023-11-27
Payer: MEDICARE

## 2023-11-27 ENCOUNTER — HOSPITAL ENCOUNTER (OUTPATIENT)
Dept: RADIOLOGY | Facility: HOSPITAL | Age: 73
Discharge: HOME | End: 2023-11-27
Attending: UROLOGY
Payer: MEDICARE

## 2023-11-27 VITALS
WEIGHT: 185.19 LBS | DIASTOLIC BLOOD PRESSURE: 82 MMHG | BODY MASS INDEX: 27.43 KG/M2 | HEIGHT: 69 IN | HEART RATE: 68 BPM | OXYGEN SATURATION: 98 % | SYSTOLIC BLOOD PRESSURE: 138 MMHG

## 2023-11-27 DIAGNOSIS — I25.10 CORONARY ARTERY DISEASE, UNSPECIFIED VESSEL OR LESION TYPE, UNSPECIFIED WHETHER ANGINA PRESENT, UNSPECIFIED WHETHER NATIVE OR TRANSPLANTED HEART: Primary | ICD-10-CM

## 2023-11-27 DIAGNOSIS — B91 POLIOMYELITIS OSTEOPATHY OF LEFT LOWER LEG: ICD-10-CM

## 2023-11-27 DIAGNOSIS — R03.0 ELEVATED BLOOD PRESSURE READING WITHOUT DIAGNOSIS OF HYPERTENSION: ICD-10-CM

## 2023-11-27 DIAGNOSIS — R31.0 GROSS HEMATURIA: ICD-10-CM

## 2023-11-27 DIAGNOSIS — I25.10 CORONARY ARTERY DISEASE INVOLVING NATIVE CORONARY ARTERY OF NATIVE HEART WITHOUT ANGINA PECTORIS: ICD-10-CM

## 2023-11-27 DIAGNOSIS — M89.662 POLIOMYELITIS OSTEOPATHY OF LEFT LOWER LEG: ICD-10-CM

## 2023-11-27 PROCEDURE — 99999 PR PBB SHADOW E&M-EST. PATIENT-LVL III: ICD-10-PCS | Mod: PBBFAC,,, | Performed by: INTERNAL MEDICINE

## 2023-11-27 PROCEDURE — 99999 PR PBB SHADOW E&M-EST. PATIENT-LVL III: CPT | Mod: PBBFAC,,, | Performed by: INTERNAL MEDICINE

## 2023-11-27 PROCEDURE — 3008F BODY MASS INDEX DOCD: CPT | Mod: CPTII,S$GLB,, | Performed by: INTERNAL MEDICINE

## 2023-11-27 PROCEDURE — 74178 CT UROGRAM ABD PELVIS W WO: ICD-10-PCS | Mod: 26,,, | Performed by: RADIOLOGY

## 2023-11-27 PROCEDURE — 99214 OFFICE O/P EST MOD 30 MIN: CPT | Mod: S$GLB,,, | Performed by: INTERNAL MEDICINE

## 2023-11-27 PROCEDURE — 3288F FALL RISK ASSESSMENT DOCD: CPT | Mod: CPTII,S$GLB,, | Performed by: INTERNAL MEDICINE

## 2023-11-27 PROCEDURE — 1159F PR MEDICATION LIST DOCUMENTED IN MEDICAL RECORD: ICD-10-PCS | Mod: CPTII,S$GLB,, | Performed by: INTERNAL MEDICINE

## 2023-11-27 PROCEDURE — 1126F PR PAIN SEVERITY QUANTIFIED, NO PAIN PRESENT: ICD-10-PCS | Mod: CPTII,S$GLB,, | Performed by: INTERNAL MEDICINE

## 2023-11-27 PROCEDURE — 3288F PR FALLS RISK ASSESSMENT DOCUMENTED: ICD-10-PCS | Mod: CPTII,S$GLB,, | Performed by: INTERNAL MEDICINE

## 2023-11-27 PROCEDURE — 1157F ADVNC CARE PLAN IN RCRD: CPT | Mod: CPTII,S$GLB,, | Performed by: INTERNAL MEDICINE

## 2023-11-27 PROCEDURE — 25500020 PHARM REV CODE 255: Mod: PN | Performed by: UROLOGY

## 2023-11-27 PROCEDURE — 74178 CT ABD&PLV WO CNTR FLWD CNTR: CPT | Mod: 26,,, | Performed by: RADIOLOGY

## 2023-11-27 PROCEDURE — 1157F PR ADVANCE CARE PLAN OR EQUIV PRESENT IN MEDICAL RECORD: ICD-10-PCS | Mod: CPTII,S$GLB,, | Performed by: INTERNAL MEDICINE

## 2023-11-27 PROCEDURE — 1101F PR PT FALLS ASSESS DOC 0-1 FALLS W/OUT INJ PAST YR: ICD-10-PCS | Mod: CPTII,S$GLB,, | Performed by: INTERNAL MEDICINE

## 2023-11-27 PROCEDURE — 74178 CT ABD&PLV WO CNTR FLWD CNTR: CPT | Mod: TC,PN

## 2023-11-27 PROCEDURE — 3008F PR BODY MASS INDEX (BMI) DOCUMENTED: ICD-10-PCS | Mod: CPTII,S$GLB,, | Performed by: INTERNAL MEDICINE

## 2023-11-27 PROCEDURE — 3079F PR MOST RECENT DIASTOLIC BLOOD PRESSURE 80-89 MM HG: ICD-10-PCS | Mod: CPTII,S$GLB,, | Performed by: INTERNAL MEDICINE

## 2023-11-27 PROCEDURE — 99214 PR OFFICE/OUTPT VISIT, EST, LEVL IV, 30-39 MIN: ICD-10-PCS | Mod: S$GLB,,, | Performed by: INTERNAL MEDICINE

## 2023-11-27 PROCEDURE — 1159F MED LIST DOCD IN RCRD: CPT | Mod: CPTII,S$GLB,, | Performed by: INTERNAL MEDICINE

## 2023-11-27 PROCEDURE — 1126F AMNT PAIN NOTED NONE PRSNT: CPT | Mod: CPTII,S$GLB,, | Performed by: INTERNAL MEDICINE

## 2023-11-27 PROCEDURE — 1101F PT FALLS ASSESS-DOCD LE1/YR: CPT | Mod: CPTII,S$GLB,, | Performed by: INTERNAL MEDICINE

## 2023-11-27 PROCEDURE — 3075F PR MOST RECENT SYSTOLIC BLOOD PRESS GE 130-139MM HG: ICD-10-PCS | Mod: CPTII,S$GLB,, | Performed by: INTERNAL MEDICINE

## 2023-11-27 PROCEDURE — 3079F DIAST BP 80-89 MM HG: CPT | Mod: CPTII,S$GLB,, | Performed by: INTERNAL MEDICINE

## 2023-11-27 PROCEDURE — 3075F SYST BP GE 130 - 139MM HG: CPT | Mod: CPTII,S$GLB,, | Performed by: INTERNAL MEDICINE

## 2023-11-27 RX ORDER — METOPROLOL SUCCINATE 25 MG/1
25 TABLET, EXTENDED RELEASE ORAL DAILY
Qty: 30 TABLET | Refills: 11 | Status: SHIPPED | OUTPATIENT
Start: 2023-11-27 | End: 2024-11-26

## 2023-11-27 RX ADMIN — IOHEXOL 100 ML: 350 INJECTION, SOLUTION INTRAVENOUS at 01:11

## 2023-11-27 NOTE — PROGRESS NOTES
Subjective:   Patient ID:  Olivier Cuenca is a 72 y.o. male who presents for evaluation of No chief complaint on file.        HPI  11.27.2023  Comes in for six-month follow-up.    Known to have mid LAD bridging.  With nonobstructive disease.    He refused Toprol in the past however today he states that he is open to try it  He gets some episodes of chest tightness same as when he presented for his initial evaluation.    No other complaints at this time.      4.10.2022  Comes in for a six-month follow-up.  No significant interval changes.    Active and working on building a house  Denies any limiting chest pain.  Known to have mid myocardial LAD bridging.  He states working on his diet in lowering carbs intake.        10/13/2022  Comes in for follow-up after his heart catheterization.  Nonobstructive disease in his mid LAD bridging.    Denies any worsening angina.    No dyspnea.    No access site issues.  Right radial access site normal.          08/2022  70 yo male , 20 PQY, quit 37 years ago ,   Comes in for evaluation of chest pain, started about 3 weeks ago, was having sharp, left sided chest pain, parasternal mild right and left sided pains, seconds lasting , not with exertion   States when he is wore up and busy , no chest pain   He also uses crutches at home.  Not tender on exam.  No dyspnea on exertion.      Past Medical History:   Diagnosis Date    Bilateral renal cysts 5/29/2023    BPH (benign prostatic hyperplasia)     CAD (coronary artery disease) 2022    nonobs    CAD (coronary artery disease) 2022    Colon polyp     Erectile dysfunction     Ex-smoker     quit 1980s    Fatty liver     Herpes simplex     History of poliomyelitis     Migraine     Thalassemia minor        Past Surgical History:   Procedure Laterality Date    APPENDECTOMY      CHOLECYSTECTOMY      COLONOSCOPY N/A 2/28/2018    Procedure: COLONOSCOPY;  Surgeon: Bo Moreno MD;  Location: [facility];  Service: Endoscopy;   Laterality: N/A;  Pt requesting no anesthesia - no sedation.  Will not have .    COLONOSCOPY N/A 6/10/2021    Procedure: COLONOSCOPY;  Surgeon: Juancho Spicer MD;  Location: [facility];  Service: Endoscopy;  Laterality: N/A;    CORONARY ANGIOGRAPHY N/A 10/7/2022    Procedure: ANGIOGRAM, CORONARY ARTERY;  Surgeon: Pelon Lombardo MD;  Location: [facility] CATH LAB;  Service: Cardiology;  Laterality: N/A;    FEMUR FRACTURE SURGERY  2020    after fall from deer stand    HERNIA REPAIR      LEFT HEART CATHETERIZATION Left 10/7/2022    Procedure: CATHETERIZATION, HEART, LEFT;  Surgeon: Pelon Lombardo MD;  Location: [facility] CATH LAB;  Service: Cardiology;  Laterality: Left;       Social History     Tobacco Use    Smoking status: Former     Current packs/day: 0.00     Types: Cigarettes     Start date: 3/13/1964     Quit date: 3/13/1985     Years since quittin.7    Smokeless tobacco: Former   Substance Use Topics    Alcohol use: No    Drug use: Yes     Types: Marijuana       Family History   Problem Relation Age of Onset    Dementia Mother     Hyperlipidemia Father     Melanoma Neg Hx        Review of Systems   Cardiovascular:  Negative for chest pain, dyspnea on exertion, palpitations and syncope.   Genitourinary: Negative.    Neurological: Negative.        Current Outpatient Medications on File Prior to Visit   Medication Sig    GENERIC EXTERNAL MEDICATION 1 Dose 2 (two) times a day. Substance Health - Similar to Nature balance vegetable supplement    levocetirizine (XYZAL) 5 MG tablet Take 1 tablet (5 mg total) by mouth every evening.    tadalafiL (CIALIS) 5 MG tablet Take 1 tablet (5 mg total) by mouth once daily. **May Cause Dizziness** *DO NOT TAKE WITH NITRATE *    tamsulosin (FLOMAX) 0.4 mg Cap Take 1 capsule (0.4 mg total) by mouth once daily.     No current facility-administered medications on file prior to visit.       Objective:   Objective:  Wt Readings from Last 3 Encounters:   23 84 kg (185 lb 3  oz)   11/22/23 84.8 kg (186 lb 15.2 oz)   09/18/23 84.8 kg (186 lb 15.2 oz)     Temp Readings from Last 3 Encounters:   09/18/23 97.3 °F (36.3 °C) (Tympanic)   09/06/23 98 °F (36.7 °C) (Oral)   06/12/23 98.1 °F (36.7 °C) (Tympanic)     BP Readings from Last 3 Encounters:   11/27/23 138/82   11/22/23 (!) 143/86   09/18/23 132/82     Pulse Readings from Last 3 Encounters:   11/27/23 68   11/22/23 69   09/18/23 73       Physical Exam  Vitals reviewed.   Constitutional:       Appearance: He is well-developed.   Neck:      Vascular: No carotid bruit.   Cardiovascular:      Rate and Rhythm: Normal rate and regular rhythm.      Pulses: Intact distal pulses.      Heart sounds: Normal heart sounds. No murmur heard.  Pulmonary:      Breath sounds: Normal breath sounds.   Neurological:      Mental Status: He is oriented to person, place, and time.         Lab Results   Component Value Date    CHOL 127 08/01/2023    CHOL 125 07/15/2022    CHOL 127 05/06/2021     Lab Results   Component Value Date    HDL 36 (L) 08/01/2023    HDL 36 (L) 07/15/2022    HDL 33 (L) 05/06/2021     Lab Results   Component Value Date    LDLCALC 71.8 08/01/2023    LDLCALC 64.4 07/15/2022    LDLCALC 69.2 05/06/2021     Lab Results   Component Value Date    TRIG 96 08/01/2023    TRIG 123 07/15/2022    TRIG 124 05/06/2021     Lab Results   Component Value Date    CHOLHDL 28.3 08/01/2023    CHOLHDL 28.8 07/15/2022    CHOLHDL 26.0 05/06/2021       Chemistry        Component Value Date/Time     08/01/2023 0748    K 4.9 08/01/2023 0748     08/01/2023 0748    CO2 27 08/01/2023 0748    BUN 14 08/01/2023 0748    CREATININE 0.9 11/22/2023 1402    GLU 91 08/01/2023 0748        Component Value Date/Time    CALCIUM 9.4 08/01/2023 0748    ALKPHOS 100 08/01/2023 0748    AST 24 08/01/2023 0748    ALT 26 08/01/2023 0748    BILITOT 0.8 08/01/2023 0748    ESTGFRAFRICA >60.0 07/15/2022 0827    EGFRNONAA >60.0 07/15/2022 0827          Lab Results   Component Value  Date    TSH 2.39 08/04/2009     Lab Results   Component Value Date    INR 1.0 10/05/2022     Lab Results   Component Value Date    WBC 5.44 08/01/2023    HGB 16.3 08/01/2023    HCT 49.8 08/01/2023    MCV 93 08/01/2023     08/01/2023     BNP  @LABRCNTIP(BNP,BNPTRIAGEBLO)@  Estimated Creatinine Clearance: 74.2 mL/min (based on SCr of 0.9 mg/dL).     Imaging:  ======  No results found for this or any previous visit.    No results found for this or any previous visit.    No results found for this or any previous visit.    Results for orders placed during the hospital encounter of 07/27/22    X-Ray Chest PA And Lateral    Narrative  EXAMINATION:  XR CHEST PA AND LATERAL    CLINICAL HISTORY:  Chest pain, unspecified    TECHNIQUE:  PA and lateral views of the chest were performed.    COMPARISON:  Prior radiographs    FINDINGS:  Cardiac silhouette and mediastinal contours are normal.  Lungs are clear.  Osseous structures are intact.    Impression  No acute cardiopulmonary process.      Electronically signed by: Yuri Bran MD  Date:    07/27/2022  Time:    11:43    No results found for this or any previous visit.    No valid procedures specified.    Results for orders placed during the hospital encounter of 10/07/22    Cardiac catheterization    Conclusion    The pre-procedure left ventricular end diastolic pressure was 8.    The estimated blood loss was none.    There was non-obstructive coronary artery disease.    Mid LAD bridging    The procedure log was documented by Documenter: Cely Antonio RN and verified by Pelon Lombardo MD.    Date: 10/7/2022  Time: 11:28 AM    Diagnostic Results:  ECG: Reviewed  Sinus rhythm.  Normal ECG.  The ASCVD Risk score (Boston DK, et al., 2019) failed to calculate for the following reasons:    The valid total cholesterol range is 130 to 320 mg/dL    Assessment and Plan:   Coronary artery disease, unspecified vessel or lesion type, unspecified whether angina present,  unspecified whether native or transplanted heart  -     metoprolol succinate (TOPROL-XL) 25 MG 24 hr tablet; Take 1 tablet (25 mg total) by mouth once daily.  Dispense: 30 tablet; Refill: 11    Poliomyelitis osteopathy of left lower leg    Coronary artery disease involving native coronary artery of native heart without angina pectoris    Elevated blood pressure reading without diagnosis of hypertension        Mid LAD bridging.  Patient agreeable with beta-blockers now.    Patient expresses he has a lot of concern about medications and side effects.  Declined statin before.  Reviewed all tests and above medical conditions with patient in detail and formulated treatment plan.  Risk factor modification discussed.   Cardiac low salt diet discussed.  Maintaining healthy weight and weight loss goals were discussed in clinic.    Follow up in 6 months

## 2023-12-18 ENCOUNTER — LAB VISIT (OUTPATIENT)
Dept: LAB | Facility: HOSPITAL | Age: 73
End: 2023-12-18
Attending: UROLOGY
Payer: MEDICARE

## 2023-12-18 DIAGNOSIS — N40.1 BENIGN PROSTATIC HYPERPLASIA WITH URINARY FREQUENCY: ICD-10-CM

## 2023-12-18 DIAGNOSIS — R35.0 BENIGN PROSTATIC HYPERPLASIA WITH URINARY FREQUENCY: ICD-10-CM

## 2023-12-18 PROCEDURE — 84153 ASSAY OF PSA TOTAL: CPT | Performed by: UROLOGY

## 2023-12-18 PROCEDURE — 36415 COLL VENOUS BLD VENIPUNCTURE: CPT | Performed by: UROLOGY

## 2023-12-19 LAB — COMPLEXED PSA SERPL-MCNC: 2.5 NG/ML (ref 0–4)

## 2024-01-03 ENCOUNTER — PROCEDURE VISIT (OUTPATIENT)
Dept: UROLOGY | Facility: CLINIC | Age: 74
End: 2024-01-03
Payer: MEDICARE

## 2024-01-03 DIAGNOSIS — R35.0 BENIGN PROSTATIC HYPERPLASIA WITH URINARY FREQUENCY: Primary | ICD-10-CM

## 2024-01-03 DIAGNOSIS — R97.20 ELEVATED PSA: ICD-10-CM

## 2024-01-03 DIAGNOSIS — N28.1 RENAL CYST: ICD-10-CM

## 2024-01-03 DIAGNOSIS — R31.0 GROSS HEMATURIA: ICD-10-CM

## 2024-01-03 DIAGNOSIS — N40.1 BENIGN PROSTATIC HYPERPLASIA WITH URINARY FREQUENCY: Primary | ICD-10-CM

## 2024-01-03 PROCEDURE — 52000 CYSTOURETHROSCOPY: CPT | Mod: S$GLB,,, | Performed by: UROLOGY

## 2024-01-03 PROCEDURE — 88112 CYTOPATH CELL ENHANCE TECH: CPT | Mod: 26,,, | Performed by: PATHOLOGY

## 2024-01-03 PROCEDURE — 88112 CYTOPATH CELL ENHANCE TECH: CPT | Performed by: PATHOLOGY

## 2024-01-03 RX ORDER — CIPROFLOXACIN 500 MG/1
500 TABLET ORAL
Status: COMPLETED | OUTPATIENT
Start: 2024-01-03 | End: 2024-01-03

## 2024-01-03 RX ORDER — LIDOCAINE HYDROCHLORIDE 20 MG/ML
JELLY TOPICAL
Status: COMPLETED | OUTPATIENT
Start: 2024-01-03 | End: 2024-01-03

## 2024-01-03 RX ADMIN — LIDOCAINE HYDROCHLORIDE 10 ML: 20 JELLY TOPICAL at 08:01

## 2024-01-03 RX ADMIN — CIPROFLOXACIN 500 MG: 500 TABLET ORAL at 08:01

## 2024-01-03 NOTE — PROGRESS NOTES
Cipro 500 mg given orally per Dr. Hook prior to procedure for prophylaxis.  Lidocaine Jelly 2% given prophylactically via penis/urethra to aid in comfort during procedure. Patient tolerated well.    Charlette Tovar LPN

## 2024-01-03 NOTE — PROCEDURES
Procedures  Chief Complaint:   Encounter Diagnoses   Name Primary?    Benign prostatic hyperplasia with urinary frequency Yes    Elevated PSA     Renal cyst     Gross hematuria        HPI:    1/3/24- here today for cystoscopy.    72-year-old gentleman who comes in for re-evaluation of bilateral renal cysts.  In addition patient has BPH which he treats with Cialis 5 mg usually about twice per week.  He states that when he does take it symptoms definitely.  No gross hematuria, no microscopic hematuria, he is never been a smoker.  He has a weak stream, without the Cialis with it indefinitely improves it.  He does have increased frequency and urgency, no leakage, gets up 4 times per evening, known stable bilateral cysts.  PSA does appear to be rising, last check though was last summer.  No other urological history.  No family history of urological cancers or stones, except for prostate cancer in his father but diagnosed after he was 80.    Allergies:  Codeine and Codeine sulfate    Medications:  See MAR    Review of Systems:  General: No fever, chills, fatigability, or weight loss.  Skin: No rashes, itching, or changes in color or texture of skin.  Chest: Denies KAN, cyanosis, wheezing, cough, and sputum production.  Abdomen: Appetite fine. No weight loss. Denies diarrhea, abdominal pain, hematemesis, or blood in stool.  Musculoskeletal: No joint stiffness or swelling. Denies back pain.  : As above.  All other review of systems negative.    PMH:   has a past medical history of Bilateral renal cysts (5/29/2023), BPH (benign prostatic hyperplasia), CAD (coronary artery disease) (2022), CAD (coronary artery disease) (2022), Colon polyp, Erectile dysfunction, Ex-smoker, Fatty liver, Herpes simplex, History of poliomyelitis, Migraine, and Thalassemia minor.    PSH:   has a past surgical history that includes Cholecystectomy; Appendectomy; Hernia repair; Colonoscopy (N/A, 2/28/2018); Femur fracture surgery (02/2020);  Colonoscopy (N/A, 6/10/2021); Left heart catheterization (Left, 10/7/2022); and Coronary angiography (N/A, 10/7/2022).    FamHx: family history includes Dementia in his mother; Hyperlipidemia in his father.    SocHx:  reports that he quit smoking about 38 years ago. His smoking use included cigarettes. He started smoking about 59 years ago. He has quit using smokeless tobacco. He reports current drug use. Drug: Marijuana. He reports that he does not drink alcohol.      Physical Exam:  There were no vitals filed for this visit.    General: A&Ox3, no apparent distress, no deformities  Neck: No masses, normal ROM  Lungs: normal inspiration, no use of accessory muscles  Heart: normal pulse, no arrhythmias  Abdomen: Soft, NT, ND, no masses, no hernias, no hepatosplenomegaly  Skin: The skin is warm and dry. No jaundice.  Ext: No c/c/e.  : 1/24- Test desc vidya, no abnormalities of epididymus. Normal penile and scrotal skin. Meatus normal.  VAIBHAV: 6/23- Normal rectal tone. Prost 25 gm no nodules or masses appreciated. SV not palpable. Perineum and anus normal.    Labs/Studies:   PVR 7 ml 9/23  Cystoscopy lateral lobe coaptation, large median lobe 1/24  PSA 2.5 12/23  PSA 4.3 8/23  PSA 3.2 7/22   PSA 2.2 5/21  PSA 1.6 11/19  Creatinine 0.8 10/22   CT urogram splenic cysts 11/23  HENRRY right renal cyst 1.1 cm, left renal cyst 3.7 cm, stable 6/23  CT with contrast bilateral renal cysts 3/18    Procedure: Diagnostic Cystoscopy    Procedure in Detail: After proper consents were obtained, the patient was prepped and draped in normal sterile fashion for diagnostic cystoscopy. 5 ml of lidocaine jelly was instilled in the urethra. The flexible cystoscope was then introduced into the urethra, and advanced into the bladder under direct vision. The urethral mucosa appeared normal, and no strictures were noted. The sphincter appeared to be normal, and the veru montanum was unremarkable. The prostatic mucosa demonstrates lateral lobe  coaptation with a large median lobe. The bladder neck was normal. Inspection of the interior of the bladder was then carried out. The trigone was unremarkable, with no mucosal lesions. The ureteral orifices were normal in position and configuration. Systematic inspection of the mucosa of the bladder it was then carried out, rotating the cystoscope so that all areas of the left and right lateral walls, the dome of the bladder, and the posterior wall were all visualized. The cystoscope was then advanced further into the bladder, and maximum deflection of the scope was performed so that the bladder neck could be inspected. No mucosal lesions were noted there. The cystoscope was then removed, and the procedure terminated.     Findings:  Lateral lobe coaptation, large median lobe      Impression/Plan:       1. BPH- Cialis 5 while working well for the erections does not control his flow.  Tamsulosin caused constipation therefore he stopped it.  After the findings on cystoscopy today he is considering surgical management, he will let me know if he would like to proceed prior to the next appointment.  We did discuss cystoscopy with Green[facility] laser TURP in detail, please see below.  Otherwise I will see him in 6 weeks and reassess.      2. Elevated PSA- PSA has reduced back down appropriately.  Will continue to monitor.      3. Renal cysts- this appear to be stable, we can follow him expectantly.  In regards to the splenic cysts, I will ask General surgery if this needs to be monitored.    4. Gross hematuria- CT urogram is otherwise unremarkable, cystoscopy demonstrates a large prostate.  Follow-up on cytology and if clear then clear from a structural standpoint.      The patient understands the risks, benefits and alternatives.  These include but are not limited to damage to the surrounding structures including the urethra, prostate, ureteral orifices, bladder and rectum.  Risk of hematuria, regrowth, recurrent obstruction  requiring further procedures or medical therapy, infection, further bleeding requiring other procedures, persistent burning.  Risk of perforation requiring an open procedure.  Risk of the possible need for stents or longer term catheterization.  Patient understands a biopsy may diagnose him with prostate cancer during the procedure.  Understanding that retrograde ejaculation will occur following the procedure.  Risk of heart attack, stroke, death, DVT and PE.  Patient understanding of all of the above has elected to pursue the procedure.

## 2024-01-08 LAB
FINAL PATHOLOGIC DIAGNOSIS: NORMAL
Lab: NORMAL

## 2024-02-14 ENCOUNTER — OFFICE VISIT (OUTPATIENT)
Dept: UROLOGY | Facility: CLINIC | Age: 74
End: 2024-02-14
Payer: MEDICARE

## 2024-02-14 VITALS
DIASTOLIC BLOOD PRESSURE: 81 MMHG | BODY MASS INDEX: 27.09 KG/M2 | SYSTOLIC BLOOD PRESSURE: 138 MMHG | RESPIRATION RATE: 18 BRPM | HEART RATE: 63 BPM | WEIGHT: 183.44 LBS

## 2024-02-14 DIAGNOSIS — R31.0 GROSS HEMATURIA: ICD-10-CM

## 2024-02-14 DIAGNOSIS — R97.20 ELEVATED PSA: ICD-10-CM

## 2024-02-14 DIAGNOSIS — N40.1 BENIGN PROSTATIC HYPERPLASIA WITH URINARY FREQUENCY: Primary | ICD-10-CM

## 2024-02-14 DIAGNOSIS — N28.1 RENAL CYST: ICD-10-CM

## 2024-02-14 DIAGNOSIS — R35.0 BENIGN PROSTATIC HYPERPLASIA WITH URINARY FREQUENCY: Primary | ICD-10-CM

## 2024-02-14 PROCEDURE — 99999 PR PBB SHADOW E&M-EST. PATIENT-LVL III: CPT | Mod: PBBFAC,,, | Performed by: UROLOGY

## 2024-02-14 PROCEDURE — 99214 OFFICE O/P EST MOD 30 MIN: CPT | Mod: S$GLB,,, | Performed by: UROLOGY

## 2024-02-14 NOTE — PROGRESS NOTES
Chief Complaint:   Encounter Diagnoses   Name Primary?    Benign prostatic hyperplasia with urinary frequency Yes    Elevated PSA     Renal cyst     Gross hematuria        HPI:    2/14/24- patient has noted no further hematuria, voiding is relatively stable but does not want pursue surgery at this juncture.  Patient is currently not due for a PSA.    72-year-old gentleman who comes in for re-evaluation of bilateral renal cysts.  In addition patient has BPH which he treats with Cialis 5 mg usually about twice per week.  He states that when he does take it symptoms definitely.  No gross hematuria, no microscopic hematuria, he is never been a smoker.  He has a weak stream, without the Cialis with it indefinitely improves it.  He does have increased frequency and urgency, no leakage, gets up 4 times per evening, known stable bilateral cysts.  PSA does appear to be rising, last check though was last summer.  No other urological history.  No family history of urological cancers or stones, except for prostate cancer in his father but diagnosed after he was 80.    Allergies:  Codeine and Codeine sulfate    Medications:  See MAR    Review of Systems:  General: No fever, chills, fatigability, or weight loss.  Skin: No rashes, itching, or changes in color or texture of skin.  Chest: Denies KAN, cyanosis, wheezing, cough, and sputum production.  Abdomen: Appetite fine. No weight loss. Denies diarrhea, abdominal pain, hematemesis, or blood in stool.  Musculoskeletal: No joint stiffness or swelling. Denies back pain.  : As above.  All other review of systems negative.    PMH:   has a past medical history of Bilateral renal cysts (5/29/2023), BPH (benign prostatic hyperplasia), CAD (coronary artery disease) (2022), CAD (coronary artery disease) (2022), Colon polyp, Erectile dysfunction, Ex-smoker, Fatty liver, Herpes simplex, History of poliomyelitis, Migraine, and Thalassemia minor.    PSH:   has a past surgical history that  includes Cholecystectomy; Appendectomy; Hernia repair; Colonoscopy (N/A, 2/28/2018); Femur fracture surgery (02/2020); Colonoscopy (N/A, 6/10/2021); Left heart catheterization (Left, 10/7/2022); and Coronary angiography (N/A, 10/7/2022).    FamHx: family history includes Dementia in his mother; Hyperlipidemia in his father.    SocHx:  reports that he quit smoking about 38 years ago. His smoking use included cigarettes. He started smoking about 59 years ago. He has quit using smokeless tobacco. He reports current drug use. Drug: Marijuana. He reports that he does not drink alcohol.      Physical Exam:  There were no vitals filed for this visit.    General: A&Ox3, no apparent distress, no deformities  Neck: No masses, normal ROM  Lungs: normal inspiration, no use of accessory muscles  Heart: normal pulse, no arrhythmias  Abdomen: Soft, NT, ND, no masses, no hernias, no hepatosplenomegaly  Skin: The skin is warm and dry. No jaundice.  Ext: No c/c/e.  : 1/24- Test desc vidya, no abnormalities of epididymus. Normal penile and scrotal skin. Meatus normal.  VAIBHAV: 6/23- Normal rectal tone. Prost 25 gm no nodules or masses appreciated. SV not palpable. Perineum and anus normal.    Labs/Studies:   PVR 7 ml 9/23  Cystoscopy lateral lobe coaptation, large median lobe 1/24  Cytology negative 1/24  PSA 2.5 12/23  PSA 4.3 8/23  PSA 3.2 7/22   PSA 2.2 5/21  PSA 1.6 11/19  Creatinine 0.8 10/22   CT urogram splenic cysts 11/23  HENRRY right renal cyst 1.1 cm, left renal cyst 3.7 cm, stable 6/23  CT with contrast bilateral renal cysts 3/18    Impression/Plan:       1. BPH- Cialis 5 he has not really been taking every day, he just started some over-the-counter medications.  For now it all appears to be stable and he does not want to pursue surgical management.  Call with any worsening of symptoms or other issues prior to the next appointment.  Otherwise see me in 1 way with a uroflow, postvoid residual and a PSA.      2. Elevated PSA- PSA  has reduced and we will continue to monitor.      3. Renal cysts- this appear to be stable, we can follow him expectantly.    4. Gross hematuria- structurally clear, and will continue surveillance protocol.

## 2024-05-27 ENCOUNTER — OFFICE VISIT (OUTPATIENT)
Dept: CARDIOLOGY | Facility: CLINIC | Age: 74
End: 2024-05-27
Payer: MEDICARE

## 2024-05-27 VITALS
DIASTOLIC BLOOD PRESSURE: 80 MMHG | HEART RATE: 65 BPM | OXYGEN SATURATION: 96 % | BODY MASS INDEX: 27.48 KG/M2 | WEIGHT: 186.06 LBS | SYSTOLIC BLOOD PRESSURE: 126 MMHG

## 2024-05-27 DIAGNOSIS — I25.10 CORONARY ARTERY DISEASE, UNSPECIFIED VESSEL OR LESION TYPE, UNSPECIFIED WHETHER ANGINA PRESENT, UNSPECIFIED WHETHER NATIVE OR TRANSPLANTED HEART: ICD-10-CM

## 2024-05-27 DIAGNOSIS — N40.0 BENIGN PROSTATIC HYPERPLASIA, UNSPECIFIED WHETHER LOWER URINARY TRACT SYMPTOMS PRESENT: ICD-10-CM

## 2024-05-27 DIAGNOSIS — K21.9 GASTROESOPHAGEAL REFLUX DISEASE, UNSPECIFIED WHETHER ESOPHAGITIS PRESENT: ICD-10-CM

## 2024-05-27 DIAGNOSIS — B91 POLIOMYELITIS OSTEOPATHY OF LEFT LOWER LEG: Primary | ICD-10-CM

## 2024-05-27 DIAGNOSIS — M89.662 POLIOMYELITIS OSTEOPATHY OF LEFT LOWER LEG: Primary | ICD-10-CM

## 2024-05-27 PROCEDURE — 1126F AMNT PAIN NOTED NONE PRSNT: CPT | Mod: CPTII,S$GLB,, | Performed by: INTERNAL MEDICINE

## 2024-05-27 PROCEDURE — 3074F SYST BP LT 130 MM HG: CPT | Mod: CPTII,S$GLB,, | Performed by: INTERNAL MEDICINE

## 2024-05-27 PROCEDURE — 1157F ADVNC CARE PLAN IN RCRD: CPT | Mod: CPTII,S$GLB,, | Performed by: INTERNAL MEDICINE

## 2024-05-27 PROCEDURE — 1101F PT FALLS ASSESS-DOCD LE1/YR: CPT | Mod: CPTII,S$GLB,, | Performed by: INTERNAL MEDICINE

## 2024-05-27 PROCEDURE — 99214 OFFICE O/P EST MOD 30 MIN: CPT | Mod: S$GLB,,, | Performed by: INTERNAL MEDICINE

## 2024-05-27 PROCEDURE — 1159F MED LIST DOCD IN RCRD: CPT | Mod: CPTII,S$GLB,, | Performed by: INTERNAL MEDICINE

## 2024-05-27 PROCEDURE — 3288F FALL RISK ASSESSMENT DOCD: CPT | Mod: CPTII,S$GLB,, | Performed by: INTERNAL MEDICINE

## 2024-05-27 PROCEDURE — 3079F DIAST BP 80-89 MM HG: CPT | Mod: CPTII,S$GLB,, | Performed by: INTERNAL MEDICINE

## 2024-05-27 PROCEDURE — 3008F BODY MASS INDEX DOCD: CPT | Mod: CPTII,S$GLB,, | Performed by: INTERNAL MEDICINE

## 2024-05-27 PROCEDURE — 99999 PR PBB SHADOW E&M-EST. PATIENT-LVL III: CPT | Mod: PBBFAC,,, | Performed by: INTERNAL MEDICINE

## 2024-05-27 NOTE — PROGRESS NOTES
Subjective:   Patient ID:  Olivier Cuenca is a 73 y.o. male who presents for evaluation of No chief complaint on file.        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    Known to have mid LAD bridging.  With nonobstructive disease.    He refused Toprol in the past however today he states that he is open to try it  He gets some episodes of chest tightness same as when he presented for his initial evaluation.    No other complaints at this time.      4.10.2022  Comes in for a six-month follow-up.  No significant interval changes.    Active and working on building a house  Denies any limiting chest pain.  Known to have mid myocardial LAD bridging.  He states working on his diet in lowering carbs intake.        10/13/2022  Comes in for follow-up after his heart catheterization.  Nonobstructive disease in his mid LAD bridging.    Denies any worsening angina.    No dyspnea.    No access site issues.  Right radial access site normal.          08/2022  70 yo male , 20 PQY, quit 37 years ago ,   Comes in for evaluation of chest pain, started about 3 weeks ago, was having sharp, left sided chest pain, parasternal mild right and left sided pains, seconds lasting , not with exertion   States when he is wore up and busy , no chest pain   He also uses crutches at home.  Not tender on exam.  No dyspnea on exertion.      Past Medical History:   Diagnosis Date    Bilateral renal cysts 5/29/2023    BPH (benign prostatic hyperplasia)     CAD (coronary artery disease) 2022    nonobs    CAD (coronary artery disease) 2022    Colon polyp     Erectile dysfunction      Ex-smoker     quit     Fatty liver     Herpes simplex     History of poliomyelitis     Migraine     Thalassemia minor        Past Surgical History:   Procedure Laterality Date    APPENDECTOMY      CHOLECYSTECTOMY      COLONOSCOPY N/A 2018    Procedure: COLONOSCOPY;  Surgeon: Bo Moreno MD;  Location: [facility] ENDO;  Service: Endoscopy;  Laterality: N/A;  Pt requesting no anesthesia - no sedation.  Will not have .    COLONOSCOPY N/A 6/10/2021    Procedure: COLONOSCOPY;  Surgeon: Juancho Spicer MD;  Location: [facility] ENDO;  Service: Endoscopy;  Laterality: N/A;    CORONARY ANGIOGRAPHY N/A 10/7/2022    Procedure: ANGIOGRAM, CORONARY ARTERY;  Surgeon: Pelon Lombardo MD;  Location: [facility] CATH LAB;  Service: Cardiology;  Laterality: N/A;    FEMUR FRACTURE SURGERY  2020    after fall from deer stand    HERNIA REPAIR      LEFT HEART CATHETERIZATION Left 10/7/2022    Procedure: CATHETERIZATION, HEART, LEFT;  Surgeon: Pelon Lombardo MD;  Location: [facility] CATH LAB;  Service: Cardiology;  Laterality: Left;       Social History     Tobacco Use    Smoking status: Former     Current packs/day: 0.00     Types: Cigarettes     Start date: 3/13/1964     Quit date: 3/13/1985     Years since quittin.2    Smokeless tobacco: Former   Substance Use Topics    Alcohol use: No    Drug use: Yes     Types: Marijuana       Family History   Problem Relation Name Age of Onset    Dementia Mother      Hyperlipidemia Father      Melanoma Neg Hx         Review of Systems   Cardiovascular:  Negative for chest pain, dyspnea on exertion, palpitations and syncope.   Genitourinary: Negative.    Neurological: Negative.        Current Outpatient Medications on File Prior to Visit   Medication Sig    metoprolol succinate (TOPROL-XL) 25 MG 24 hr tablet Take 1 tablet (25 mg total) by mouth once daily.    tadalafiL (CIALIS) 5 MG tablet Take 1 tablet (5 mg total) by mouth once daily. **May Cause Dizziness** *DO NOT TAKE WITH  NITRATE *    GENERIC EXTERNAL MEDICATION 1 Dose 2 (two) times a day. Substance Health - Similar to Nature balance vegetable supplement (Patient not taking: Reported on 5/27/2024)    levocetirizine (XYZAL) 5 MG tablet Take 1 tablet (5 mg total) by mouth every evening.    tamsulosin (FLOMAX) 0.4 mg Cap Take 1 capsule (0.4 mg total) by mouth once daily. (Patient not taking: Reported on 2/14/2024)     No current facility-administered medications on file prior to visit.       Objective:   Objective:  Wt Readings from Last 3 Encounters:   05/27/24 84.4 kg (186 lb 1.1 oz)   02/14/24 83.2 kg (183 lb 6.8 oz)   11/27/23 84 kg (185 lb 3 oz)     Temp Readings from Last 3 Encounters:   09/18/23 97.3 °F (36.3 °C) (Tympanic)   09/06/23 98 °F (36.7 °C) (Oral)   06/12/23 98.1 °F (36.7 °C) (Tympanic)     BP Readings from Last 3 Encounters:   05/27/24 126/80   02/14/24 138/81   11/27/23 138/82     Pulse Readings from Last 3 Encounters:   05/27/24 65   02/14/24 63   11/27/23 68       Physical Exam  Vitals reviewed.   Constitutional:       Appearance: He is well-developed.   Neck:      Vascular: No carotid bruit.   Cardiovascular:      Rate and Rhythm: Normal rate and regular rhythm.      Pulses: Intact distal pulses.      Heart sounds: Normal heart sounds. No murmur heard.  Pulmonary:      Breath sounds: Normal breath sounds.   Neurological:      Mental Status: He is oriented to person, place, and time.         Lab Results   Component Value Date    CHOL 127 08/01/2023    CHOL 125 07/15/2022    CHOL 127 05/06/2021     Lab Results   Component Value Date    HDL 36 (L) 08/01/2023    HDL 36 (L) 07/15/2022    HDL 33 (L) 05/06/2021     Lab Results   Component Value Date    LDLCALC 71.8 08/01/2023    LDLCALC 64.4 07/15/2022    LDLCALC 69.2 05/06/2021     Lab Results   Component Value Date    TRIG 96 08/01/2023    TRIG 123 07/15/2022    TRIG 124 05/06/2021     Lab Results   Component Value Date    CHOLHDL 28.3 08/01/2023    CHOLHDL 28.8  07/15/2022    CHOLHDL 26.0 05/06/2021       Chemistry        Component Value Date/Time     08/01/2023 0748    K 4.9 08/01/2023 0748     08/01/2023 0748    CO2 27 08/01/2023 0748    BUN 14 08/01/2023 0748    CREATININE 0.9 11/22/2023 1402    GLU 91 08/01/2023 0748        Component Value Date/Time    CALCIUM 9.4 08/01/2023 0748    ALKPHOS 100 08/01/2023 0748    AST 24 08/01/2023 0748    ALT 26 08/01/2023 0748    BILITOT 0.8 08/01/2023 0748    ESTGFRAFRICA >60.0 07/15/2022 0827    EGFRNONAA >60.0 07/15/2022 0827          Lab Results   Component Value Date    TSH 2.39 08/04/2009     Lab Results   Component Value Date    INR 1.0 10/05/2022     Lab Results   Component Value Date    WBC 5.44 08/01/2023    HGB 16.3 08/01/2023    HCT 49.8 08/01/2023    MCV 93 08/01/2023     08/01/2023     BNP  @LABRCNTIP(BNP,BNPTRIAGEBLO)@  CrCl cannot be calculated (Patient's most recent lab result is older than the maximum 7 days allowed.).     Imaging:  ======  No results found for this or any previous visit.    No results found for this or any previous visit.    No results found for this or any previous visit.    Results for orders placed during the hospital encounter of 07/27/22    X-Ray Chest PA And Lateral    Narrative  EXAMINATION:  XR CHEST PA AND LATERAL    CLINICAL HISTORY:  Chest pain, unspecified    TECHNIQUE:  PA and lateral views of the chest were performed.    COMPARISON:  Prior radiographs    FINDINGS:  Cardiac silhouette and mediastinal contours are normal.  Lungs are clear.  Osseous structures are intact.    Impression  No acute cardiopulmonary process.      Electronically signed by: Yuri Bran MD  Date:    07/27/2022  Time:    11:43    No results found for this or any previous visit.    No valid procedures specified.    Results for orders placed during the hospital encounter of 10/07/22    Cardiac catheterization    Conclusion    The pre-procedure left ventricular end diastolic pressure was 8.     The estimated blood loss was none.    There was non-obstructive coronary artery disease.    Mid LAD bridging    The procedure log was documented by Documenter: Cely Antonio RN and verified by Pelon Lombardo MD.    Date: 10/7/2022  Time: 11:28 AM    Diagnostic Results:  ECG: Reviewed  Sinus rhythm.  Normal ECG.  The ASCVD Risk score (Amna DK, et al., 2019) failed to calculate for the following reasons:    The valid total cholesterol range is 130 to 320 mg/dL    Assessment and Plan:   Poliomyelitis osteopathy of left lower leg    Coronary artery disease, unspecified vessel or lesion type, unspecified whether angina present, unspecified whether native or transplanted heart  -     CV Ultrasound Bilateral Doppler Carotid; Future    Gastroesophageal reflux disease, unspecified whether esophagitis present    Benign prostatic hyperplasia, unspecified whether lower urinary tract symptoms present          Mid LAD bridging.  Patient to continue toprol, Discussed increasing toprol, but will continue to monitor  Patient expresses he has a lot of concern about medications and side effects.  Declined statin before.  Reviewed all tests and above medical conditions with patient in detail and formulated treatment plan.  Risk factor modification discussed.   Cardiac low salt diet discussed.  Maintaining healthy weight and weight loss goals were discussed in clinic.  Never had a carotid ultrasound , not on statin, had CAD    Follow up in 6 months

## 2024-06-21 ENCOUNTER — HOSPITAL ENCOUNTER (OUTPATIENT)
Dept: CARDIOLOGY | Facility: HOSPITAL | Age: 74
Discharge: HOME | End: 2024-06-21
Attending: INTERNAL MEDICINE
Payer: MEDICARE

## 2024-06-21 VITALS
HEIGHT: 69 IN | BODY MASS INDEX: 27.55 KG/M2 | WEIGHT: 186 LBS | DIASTOLIC BLOOD PRESSURE: 78 MMHG | SYSTOLIC BLOOD PRESSURE: 149 MMHG

## 2024-06-21 DIAGNOSIS — I25.10 CORONARY ARTERY DISEASE, UNSPECIFIED VESSEL OR LESION TYPE, UNSPECIFIED WHETHER ANGINA PRESENT, UNSPECIFIED WHETHER NATIVE OR TRANSPLANTED HEART: ICD-10-CM

## 2024-06-21 LAB
LEFT ARM DIASTOLIC BLOOD PRESSURE: 78 MMHG
LEFT ARM SYSTOLIC BLOOD PRESSURE: 149 MMHG
LEFT CBA DIAS: 20 CM/S
LEFT CBA SYS: 65 CM/S
LEFT CCA DIST DIAS: 21 CM/S
LEFT CCA DIST SYS: 79 CM/S
LEFT CCA MID DIAS: 23 CM/S
LEFT CCA MID SYS: 79 CM/S
LEFT CCA PROX DIAS: 21 CM/S
LEFT CCA PROX SYS: 150 CM/S
LEFT ECA DIAS: 15 CM/S
LEFT ECA SYS: 99 CM/S
LEFT ICA DIST DIAS: 25 CM/S
LEFT ICA DIST SYS: 68 CM/S
LEFT ICA MID DIAS: 25 CM/S
LEFT ICA MID SYS: 70 CM/S
LEFT ICA PROX DIAS: 29 CM/S
LEFT ICA PROX SYS: 82 CM/S
LEFT VERTEBRAL DIAS: 8 CM/S
LEFT VERTEBRAL SYS: 40 CM/S
OHS CV CAROTID RIGHT ICA EDV HIGHEST: 19
OHS CV CAROTID ULTRASOUND LEFT ICA/CCA RATIO: 1.04
OHS CV CAROTID ULTRASOUND RIGHT ICA/CCA RATIO: 0.95
OHS CV PV CAROTID LEFT HIGHEST CCA: 150
OHS CV PV CAROTID LEFT HIGHEST ICA: 82
OHS CV PV CAROTID RIGHT HIGHEST CCA: 111
OHS CV PV CAROTID RIGHT HIGHEST ICA: 77
OHS CV US CAROTID LEFT HIGHEST EDV: 29
RIGHT ARM DIASTOLIC BLOOD PRESSURE: 74 MMHG
RIGHT ARM SYSTOLIC BLOOD PRESSURE: 139 MMHG
RIGHT CBA DIAS: 14 CM/S
RIGHT CBA SYS: 56 CM/S
RIGHT CCA DIST DIAS: 18 CM/S
RIGHT CCA DIST SYS: 81 CM/S
RIGHT CCA MID DIAS: 16 CM/S
RIGHT CCA MID SYS: 94 CM/S
RIGHT CCA PROX DIAS: 18 CM/S
RIGHT CCA PROX SYS: 111 CM/S
RIGHT ECA DIAS: 12 CM/S
RIGHT ECA SYS: 112 CM/S
RIGHT ICA DIST DIAS: 19 CM/S
RIGHT ICA DIST SYS: 51 CM/S
RIGHT ICA MID DIAS: 18 CM/S
RIGHT ICA MID SYS: 49 CM/S
RIGHT ICA PROX DIAS: 16 CM/S
RIGHT ICA PROX SYS: 77 CM/S
RIGHT VERTEBRAL DIAS: 14 CM/S
RIGHT VERTEBRAL SYS: 43 CM/S

## 2024-06-21 PROCEDURE — 93880 EXTRACRANIAL BILAT STUDY: CPT | Mod: 26,,, | Performed by: INTERNAL MEDICINE

## 2024-06-21 PROCEDURE — 93880 EXTRACRANIAL BILAT STUDY: CPT

## 2024-07-31 ENCOUNTER — PATIENT MESSAGE (OUTPATIENT)
Dept: RESEARCH | Facility: HOSPITAL | Age: 74
End: 2024-07-31
Payer: MEDICARE

## 2024-09-03 ENCOUNTER — HOSPITAL ENCOUNTER (OUTPATIENT)
Dept: RADIOLOGY | Facility: HOSPITAL | Age: 74
Discharge: HOME | End: 2024-09-03
Attending: INTERNAL MEDICINE
Payer: MEDICARE

## 2024-09-03 ENCOUNTER — TELEPHONE (OUTPATIENT)
Dept: PULMONOLOGY | Facility: CLINIC | Age: 74
End: 2024-09-03
Payer: MEDICARE

## 2024-09-03 ENCOUNTER — OFFICE VISIT (OUTPATIENT)
Dept: PULMONOLOGY | Facility: CLINIC | Age: 74
End: 2024-09-03
Payer: MEDICARE

## 2024-09-03 VITALS
DIASTOLIC BLOOD PRESSURE: 90 MMHG | WEIGHT: 188.06 LBS | HEART RATE: 60 BPM | SYSTOLIC BLOOD PRESSURE: 160 MMHG | BODY MASS INDEX: 27.85 KG/M2 | OXYGEN SATURATION: 99 % | HEIGHT: 69 IN | RESPIRATION RATE: 21 BRPM

## 2024-09-03 DIAGNOSIS — R91.8 PULMONARY NODULES: Primary | ICD-10-CM

## 2024-09-03 DIAGNOSIS — R07.89 ATYPICAL CHEST PAIN: Chronic | ICD-10-CM

## 2024-09-03 DIAGNOSIS — R91.1 PULMONARY NODULE: ICD-10-CM

## 2024-09-03 DIAGNOSIS — R91.1 PULMONARY NODULE: Primary | ICD-10-CM

## 2024-09-03 PROCEDURE — 71046 X-RAY EXAM CHEST 2 VIEWS: CPT | Mod: TC

## 2024-09-03 PROCEDURE — 1101F PT FALLS ASSESS-DOCD LE1/YR: CPT | Mod: CPTII,S$GLB,, | Performed by: INTERNAL MEDICINE

## 2024-09-03 PROCEDURE — 3288F FALL RISK ASSESSMENT DOCD: CPT | Mod: CPTII,S$GLB,, | Performed by: INTERNAL MEDICINE

## 2024-09-03 PROCEDURE — 1159F MED LIST DOCD IN RCRD: CPT | Mod: CPTII,S$GLB,, | Performed by: INTERNAL MEDICINE

## 2024-09-03 PROCEDURE — 3080F DIAST BP >= 90 MM HG: CPT | Mod: CPTII,S$GLB,, | Performed by: INTERNAL MEDICINE

## 2024-09-03 PROCEDURE — 71046 X-RAY EXAM CHEST 2 VIEWS: CPT | Mod: 26,,, | Performed by: RADIOLOGY

## 2024-09-03 PROCEDURE — 3077F SYST BP >= 140 MM HG: CPT | Mod: CPTII,S$GLB,, | Performed by: INTERNAL MEDICINE

## 2024-09-03 PROCEDURE — 99999 PR PBB SHADOW E&M-EST. PATIENT-LVL III: CPT | Mod: PBBFAC,,, | Performed by: INTERNAL MEDICINE

## 2024-09-03 PROCEDURE — 1157F ADVNC CARE PLAN IN RCRD: CPT | Mod: CPTII,S$GLB,, | Performed by: INTERNAL MEDICINE

## 2024-09-03 PROCEDURE — 1160F RVW MEDS BY RX/DR IN RCRD: CPT | Mod: CPTII,S$GLB,, | Performed by: INTERNAL MEDICINE

## 2024-09-03 PROCEDURE — 99203 OFFICE O/P NEW LOW 30 MIN: CPT | Mod: S$GLB,,, | Performed by: INTERNAL MEDICINE

## 2024-09-03 PROCEDURE — 3008F BODY MASS INDEX DOCD: CPT | Mod: CPTII,S$GLB,, | Performed by: INTERNAL MEDICINE

## 2024-09-03 NOTE — TELEPHONE ENCOUNTER
----- Message from Ramo Castanon sent at 9/3/2024 10:06 AM CDT -----  Regarding: appt  Contact: RAISSA VELEZ [[id number]]  Type:  Sooner Appointment Request    Caller is requesting a sooner appointment.      Name of Caller:  Raissa    When is the first available appointment?  Dept book-NP    Symptoms:  Annual check    Would the patient rather a call back or a response via [facility]? Call    Best Call Back Number:  [phone]    Additional Information:  Last seen 2016? Please call to advise.

## 2024-09-03 NOTE — PROGRESS NOTES
Subjective:      Patient ID: Olivier Cuenca is a 73 y.o. male.    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  He thinks it may be related to accident he had on a tractor where he got pending as dyspnea we will via branch will wanted to be sure it was not a heart or lung problem.  When he is up and mobile he has no symptoms.  After resting for about 30 minutes he starts to notice some dull aching or pain both of the left infrascapular and then the right infrascapular region.  Somewhat positional.  No associated respiratory complaints.  No wheezing, cough, fever, chills.  He did see Cardiology as well for this but his complaints are not typical of angina either.  He did have a CT done about a year ago which showed peripheral pleural-based 5 mm lung density.  On subsequent imaging of the CT abdomen was stable.  He had a chest radiograph done today which is unremarkable.    Patient Active Problem List   Diagnosis    Fatty liver    Erectile dysfunction    BPH (benign prostatic hyperplasia)    Personal history of colonic polyps    Poliomyelitis osteopathy of left lower leg    Neck pain    DDD (degenerative disc disease), cervical    GERD (gastroesophageal reflux disease)    Screen for colon cancer    Colon cancer screening    Herpes simplex    CAD (coronary artery disease)    Lesions of spleen- possible cysts or hemangiomas.    Pulmonary nodules    Renal cyst    New onset right Mid back pain    Chronic Hoarseness     Dependence on other enabling machines and devices    Abnormality of gait and mobility    Ex-smoker    Tinnitus    Benign prostatic hyperplasia with urinary frequency    Elevated PSA    Gross hematuria     Past Surgical History:   Procedure Laterality Date    APPENDECTOMY       CHOLECYSTECTOMY      COLONOSCOPY N/A 2018    Procedure: COLONOSCOPY;  Surgeon: Bo Moreno MD;  Location: [facility] ENDO;  Service: Endoscopy;  Laterality: N/A;  Pt requesting no anesthesia - no sedation.  Will not have .    COLONOSCOPY N/A 6/10/2021    Procedure: COLONOSCOPY;  Surgeon: Juancho Spicer MD;  Location: [facility] ENDO;  Service: Endoscopy;  Laterality: N/A;    CORONARY ANGIOGRAPHY N/A 10/7/2022    Procedure: ANGIOGRAM, CORONARY ARTERY;  Surgeon: Pelon Lombardo MD;  Location: [facility] CATH LAB;  Service: Cardiology;  Laterality: N/A;    FEMUR FRACTURE SURGERY  2020    after fall from deer stand    HERNIA REPAIR      LEFT HEART CATHETERIZATION Left 10/7/2022    Procedure: CATHETERIZATION, HEART, LEFT;  Surgeon: Pelon Lombardo MD;  Location: [facility] CATH LAB;  Service: Cardiology;  Laterality: Left;     Social History     Tobacco Use    Smoking status: Former     Current packs/day: 0.00     Types: Cigarettes     Start date: 3/13/1964     Quit date: 3/13/1985     Years since quittin.5    Smokeless tobacco: Former   Substance Use Topics    Alcohol use: No    Drug use: Yes     Types: Marijuana     Family History   Problem Relation Name Age of Onset    Dementia Mother      Hyperlipidemia Father      Melanoma Neg Hx         Review of Systems as per HPI otherwise negative    Objective:     Physical Exam   Constitutional: He is oriented to person, place, and time. He appears well-developed. No distress.   HENT:   Head: Normocephalic.   Cardiovascular: Normal rate and regular rhythm.   Pulmonary/Chest: Normal expansion, symmetric chest wall expansion, effort normal and breath sounds normal.   Musculoskeletal:      Cervical back: Neck supple.   Neurological: He is alert and oriented to person, place, and time.   Psychiatric: He has a normal mood and affect.   Nursing note and vitals reviewed.          9/3/2024     1:52 PM 2024     7:38 AM 2024     8:49 AM 2024     9:34 AM 2023  "   10:43 AM 11/22/2023     1:24 PM 9/18/2023     9:12 AM   Pulmonary Function Tests   SpO2 99 %  96 %  98 %  95 %   Height 5' 9" (1.753 m) 5' 9" (1.753 m)   5' 9" (1.753 m)  5' 9" (1.753 m)   Weight 85.3 kg (188 lb 0.8 oz) 84.4 kg (186 lb) 84.4 kg (186 lb 1.1 oz) 83.2 kg (183 lb 6.8 oz) 84 kg (185 lb 3 oz) 84.8 kg (186 lb 15.2 oz) 84.8 kg (186 lb 15.2 oz)   BMI (Calculated) 27.8 27.5   27.3  27.6        Assessment:     1. Pulmonary nodules    2. Atypical chest pain        Plan:     No evidence for pulmonary cause of his chest complaints  Given description most likely musculoskeletal  No indication for additional chest imaging  Continue to follow up with primary care and neurology  Return to clinic p.r.n.     "

## 2024-09-20 ENCOUNTER — PATIENT OUTREACH (OUTPATIENT)
Dept: ADMINISTRATIVE | Facility: HOSPITAL | Age: 74
End: 2024-09-20
Payer: MEDICARE

## 2024-09-20 NOTE — PROGRESS NOTES
Statin CVD DM Report: patient has no allegies to statin, not taken statin medication. Needs a PCP visit as last visit was 2023 with PA. Patient has declined statin medication in past per chart.

## 2024-10-21 ENCOUNTER — LAB VISIT (OUTPATIENT)
Dept: LAB | Facility: HOSPITAL | Age: 74
End: 2024-10-21
Attending: FAMILY MEDICINE
Payer: MEDICARE

## 2024-10-21 ENCOUNTER — OFFICE VISIT (OUTPATIENT)
Dept: INTERNAL MEDICINE | Facility: CLINIC | Age: 74
End: 2024-10-21
Payer: MEDICARE

## 2024-10-21 VITALS
HEART RATE: 58 BPM | OXYGEN SATURATION: 98 % | TEMPERATURE: 99 F | BODY MASS INDEX: 27.82 KG/M2 | HEIGHT: 69 IN | DIASTOLIC BLOOD PRESSURE: 82 MMHG | WEIGHT: 187.81 LBS | RESPIRATION RATE: 18 BRPM | SYSTOLIC BLOOD PRESSURE: 138 MMHG

## 2024-10-21 DIAGNOSIS — R49.0 HOARSE: ICD-10-CM

## 2024-10-21 DIAGNOSIS — Z23 NEED FOR VACCINATION: ICD-10-CM

## 2024-10-21 DIAGNOSIS — I25.10 CORONARY ARTERY DISEASE INVOLVING NATIVE CORONARY ARTERY OF NATIVE HEART WITHOUT ANGINA PECTORIS: Primary | ICD-10-CM

## 2024-10-21 DIAGNOSIS — K21.9 GASTROESOPHAGEAL REFLUX DISEASE, UNSPECIFIED WHETHER ESOPHAGITIS PRESENT: ICD-10-CM

## 2024-10-21 DIAGNOSIS — R91.1 SOLITARY PULMONARY NODULE: ICD-10-CM

## 2024-10-21 DIAGNOSIS — N40.0 BENIGN PROSTATIC HYPERPLASIA, UNSPECIFIED WHETHER LOWER URINARY TRACT SYMPTOMS PRESENT: ICD-10-CM

## 2024-10-21 DIAGNOSIS — I25.10 CORONARY ARTERY DISEASE INVOLVING NATIVE CORONARY ARTERY OF NATIVE HEART WITHOUT ANGINA PECTORIS: ICD-10-CM

## 2024-10-21 DIAGNOSIS — K76.0 FATTY LIVER: ICD-10-CM

## 2024-10-21 PROBLEM — M54.9 MID BACK PAIN: Status: RESOLVED | Noted: 2023-06-12 | Resolved: 2024-10-21

## 2024-10-21 LAB
ALBUMIN SERPL BCP-MCNC: 4.2 G/DL (ref 3.5–5.2)
ALP SERPL-CCNC: 93 U/L (ref 40–150)
ALT SERPL W/O P-5'-P-CCNC: 23 U/L (ref 10–44)
ANION GAP SERPL CALC-SCNC: 8 MMOL/L (ref 8–16)
AST SERPL-CCNC: 23 U/L (ref 10–40)
BILIRUB SERPL-MCNC: 0.8 MG/DL (ref 0.1–1)
BUN SERPL-MCNC: 14 MG/DL (ref 8–23)
CALCIUM SERPL-MCNC: 10.1 MG/DL (ref 8.7–10.5)
CHLORIDE SERPL-SCNC: 104 MMOL/L (ref 95–110)
CHOLEST SERPL-MCNC: 131 MG/DL (ref 120–199)
CHOLEST/HDLC SERPL: 3.5 {RATIO} (ref 2–5)
CO2 SERPL-SCNC: 26 MMOL/L (ref 23–29)
CREAT SERPL-MCNC: 0.8 MG/DL (ref 0.5–1.4)
EST. GFR  (NO RACE VARIABLE): >60 ML/MIN/1.73 M^2
GLUCOSE SERPL-MCNC: 88 MG/DL (ref 70–110)
HDLC SERPL-MCNC: 37 MG/DL (ref 40–75)
HDLC SERPL: 28.2 % (ref 20–50)
LDLC SERPL CALC-MCNC: 63.2 MG/DL (ref 63–159)
NONHDLC SERPL-MCNC: 94 MG/DL
POTASSIUM SERPL-SCNC: 4.9 MMOL/L (ref 3.5–5.1)
PROT SERPL-MCNC: 7.5 G/DL (ref 6–8.4)
SODIUM SERPL-SCNC: 138 MMOL/L (ref 136–145)
TRIGL SERPL-MCNC: 154 MG/DL (ref 30–150)

## 2024-10-21 PROCEDURE — 90653 IIV ADJUVANT VACCINE IM: CPT | Mod: S$GLB,,, | Performed by: FAMILY MEDICINE

## 2024-10-21 PROCEDURE — 3075F SYST BP GE 130 - 139MM HG: CPT | Mod: CPTII,S$GLB,, | Performed by: FAMILY MEDICINE

## 2024-10-21 PROCEDURE — 3008F BODY MASS INDEX DOCD: CPT | Mod: CPTII,S$GLB,, | Performed by: FAMILY MEDICINE

## 2024-10-21 PROCEDURE — 36415 COLL VENOUS BLD VENIPUNCTURE: CPT | Performed by: FAMILY MEDICINE

## 2024-10-21 PROCEDURE — 1126F AMNT PAIN NOTED NONE PRSNT: CPT | Mod: CPTII,S$GLB,, | Performed by: FAMILY MEDICINE

## 2024-10-21 PROCEDURE — 99214 OFFICE O/P EST MOD 30 MIN: CPT | Mod: S$GLB,,, | Performed by: FAMILY MEDICINE

## 2024-10-21 PROCEDURE — 80061 LIPID PANEL: CPT | Performed by: FAMILY MEDICINE

## 2024-10-21 PROCEDURE — 3288F FALL RISK ASSESSMENT DOCD: CPT | Mod: CPTII,S$GLB,, | Performed by: FAMILY MEDICINE

## 2024-10-21 PROCEDURE — 1157F ADVNC CARE PLAN IN RCRD: CPT | Mod: CPTII,S$GLB,, | Performed by: FAMILY MEDICINE

## 2024-10-21 PROCEDURE — 3079F DIAST BP 80-89 MM HG: CPT | Mod: CPTII,S$GLB,, | Performed by: FAMILY MEDICINE

## 2024-10-21 PROCEDURE — 1101F PT FALLS ASSESS-DOCD LE1/YR: CPT | Mod: CPTII,S$GLB,, | Performed by: FAMILY MEDICINE

## 2024-10-21 PROCEDURE — 80053 COMPREHEN METABOLIC PANEL: CPT | Performed by: FAMILY MEDICINE

## 2024-10-21 PROCEDURE — 1159F MED LIST DOCD IN RCRD: CPT | Mod: CPTII,S$GLB,, | Performed by: FAMILY MEDICINE

## 2024-10-21 PROCEDURE — G2211 COMPLEX E/M VISIT ADD ON: HCPCS | Mod: S$GLB,,, | Performed by: FAMILY MEDICINE

## 2024-10-21 PROCEDURE — G0008 ADMIN INFLUENZA VIRUS VAC: HCPCS | Mod: S$GLB,,, | Performed by: FAMILY MEDICINE

## 2024-10-21 PROCEDURE — 99999 PR PBB SHADOW E&M-EST. PATIENT-LVL V: CPT | Mod: PBBFAC,,, | Performed by: FAMILY MEDICINE

## 2024-10-21 RX ORDER — KETOCONAZOLE 20 MG/G
CREAM TOPICAL DAILY
Qty: 30 G | Refills: 0 | Status: SHIPPED | OUTPATIENT
Start: 2024-10-21 | End: 2024-10-31

## 2024-10-21 RX ORDER — METOPROLOL SUCCINATE 50 MG/1
50 TABLET, EXTENDED RELEASE ORAL DAILY
Qty: 90 TABLET | Refills: 3 | Status: SHIPPED | OUTPATIENT
Start: 2024-10-21 | End: 2025-10-21

## 2024-10-21 NOTE — PROGRESS NOTES
"Chief Complaint: Annual Exam    History of Present Illness    CHIEF COMPLAINT:  Mr. Cuenca presents today for annual physical and multiple concerns.    DERMATOLOGICAL:  He reports a skin rash where he wears his watch, first noticed last Thursday. The rash is itchy and spreading. 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 He has used cortisone cream and Neosporin, but the rash has worsened in the last day or two, particularly after using cortisone. He notes a similar occurrence about a year ago in a nearby location on his arm.    EAR, NOSE, AND THROAT:  He reports ongoing hoarseness with associated pain when moving or straining. He has undergone multiple evaluations, including several laryngoscopies and a CT scan, which were unremarkable. An evaluation at the [place] [facility] noted dry vocal cords. The hoarseness persists, causing intermittent discomfort. He also experiences postnasal drip, which he describes as "allergy stuff" that comes and goes. He denies using nasal sprays such as Flonase for symptom management.    CARDIOVASCULAR:  He reports a recent episode of chest pain four days ago, waking him up at night. The pain was localized to the right side of his chest, between the shoulder blades, just right of his backbone. He describes it as sharp with multiple occurrences within seconds. He self-treated with two aspirin, after which the pain subsided. This pain was different from his usual chest discomfort. He self-increased his Metoprolol dose from 25 mg to 50 mg daily due to this new type of chest pain and reports improvement in symptoms since the increase.    MEDICAL HISTORY:  He has a history of lung nodules, as well as nodules on the spleen and kidneys, and a fatty liver diagnosis. 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 He also reports mild neuropathy and back issues, with a healthcare provider describing his back as looking like "a train wreck." He denies any current problems from the nodules.    FAMILY HISTORY:  He reports recent loss of a first cousin to lung cancer. The cousin underwent chemotherapy and subsequently developed complications, including necrosis of approximately seven feet of small intestine, which required surgical intervention. The cousin passed away less than 24 hours after exploratory surgery.    SURGICAL HISTORY:  He reports a history of right hip fracture, which he attributes to his current right leg weakness.    SOCIAL HISTORY:  He is currently building a cabin in the woods in [place], spending significant time at the construction site and staying in a camper approximately half the time. 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    IMMUNIZATIONS:  He recently received a flu vaccine. He has a history of RSV approximately three years ago, which he describes as b  eing difficult for him.    ROS:  General: -fever, -chills, -fatigue, -weight gain, -weight loss  Eyes: -vision changes, -redness, -discharge  ENT: -ear pain, -nasal congestion, -sore throat, +hoarseness, +post nasal drip  Cardiovascular: +chest pain, -palpitations, -lower extremity edema  Respiratory: -cough, -shortness of breath, -difficulty breathing  Gastrointestinal: -abdominal pain, -nausea, -vomiting, -diarrhea, -constipation, -blood in stool  Genitourinary: -dysuria, -hematuria, -frequency  Musculoskeletal: -joint pain, -muscle pain, -muscle weakness  Skin: +rash, -lesion  Neurological: -headache, -dizziness, -numbness, -tingling, +weakness  Psychiatric: -anxiety, -depression, -sleep difficulty          Objective:   Physical Exam    Vitals: Reviewed. Nursing note reviewed.  Constitutional: Alert.  HENT: " Normocephalic. Atraumatic. External ears normal. Nose normal. PERRL. Conjunctivae normal.  Neck: ROM normal. Supple.  Cardiovascular: Normal rate and regular rhythm. Normal heart sounds.  Pulmonary: Normal breath sounds. Effort normal.  Abdominal: Bowel sounds are normal. Soft.  Musculoskeletal: ROM normal.  Skin: Warm. Dry. CIRCULAR RASH WITH INDENTATION.  Neurological: Oriented x3.  Psychiatric: Behavior normal. Thought content normal. Judgment normal.       Assessment:       1. Coronary artery disease involving native coronary artery of native heart without angina pectoris    2. Need for vaccination    3. Gastroesophageal reflux disease, unspecified whether esophagitis present    4. Benign prostatic hyperplasia, unspecified whether lower urinary tract symptoms present    5. Fatty liver    6. Chronic Hoarseness     7. Solitary pulmonary nodule        Plan:   Assessment & Plan    Assessed rash on patient's wrist, likely fungal infection rather than spider bite due to location and appearance  Evaluated persistent hoarseness and chest pain; increased metoprolol dosage from 25mg to 50mg daily in response to recent episode  Considered post-nasal drip as potential contributor to throat symptoms  Reviewed patient's history of lung nodules, fatty liver, and post-polio syndrome diagnosis    WRIST RASH:  - Explained potential causes of wrist rash, including fungal infection from moisture under watch.  - Keep watch off affected wrist area.  - Started ketoconazole cream (generic, prescription antifungal) for wrist rash.  - Discontinued use of cortisone cream on wrist rash.    POSTNASAL DRIP:  - Use Flonase nasal spray for postnasal drip: 2 sprays per nostril daily, may take up to 2 weeks for full effect.  - Started Flonase nasal spray: 2 sprays per nostril daily.    RSV VACCINATION:  - Discussed importance of RSV and shingles vaccines for older adults, particularly RSV's risk of pneumonia.  - Consider getting RSV, shingles,  and tetanus/whooping cough vaccines at pharmacy.    TETANUS/WHOOPING COUGH VACCINATION:  - Informed about the combined tetanus and whooping cough vaccine, emphasizing increased lethality of whooping cough in older individuals.    HYPERTENSION:  - Increased metoprolol from 25mg to 50mg daily.    LABS:  - Ordered annual labs for kidney, liver, sugar, and cholesterol levels.    LUNG NODULE:  - Ordered CT of lungs for nodule follow-up.    ENT REFERRAL:  - Referred to Dr. Tong (ENT specialist) for persistent hoarseness.    FOLLOW UP:  - Follow up with Dr. Lombardo in November as scheduled.        Coronary artery disease involving native coronary artery of native heart without angina pectoris  -     Comprehensive Metabolic Panel; Future; Expected date: 10/21/2024  -     Lipid Panel; Future; Expected date: 10/21/2024  -     Comprehensive Metabolic Panel; Future; Expected date: 10/21/2025  -     Lipid Panel; Future; Expected date: 10/21/2025    Need for vaccination  -     Influenza - Trivalent (Adjuvanted)    Gastroesophageal reflux disease, unspecified whether esophagitis present    Benign prostatic hyperplasia, unspecified whether lower urinary tract symptoms present    Fatty liver    Chronic Hoarseness   -     Ambulatory referral/consult to ENT; Future; Expected date: 10/28/2024    Solitary pulmonary nodule  -     CT Chest Without Contrast; Future; Expected date: 10/21/2024    Other orders  -     ketoconazole (NIZORAL) 2 % cream; Apply topically once daily. To hand rash for 10 days  Dispense: 30 g; Refill: 0  -     metoprolol succinate (TOPROL-XL) 50 MG 24 hr tablet; Take 1 tablet (50 mg total) by mouth once daily.  Dispense: 90 tablet; Refill: 3       ab today if able  Also Lab 12 months and follow up after    If no better 3-4 days followup sooner if any worsening or change in symptoms or lack of resolution

## 2024-10-22 ENCOUNTER — PATIENT MESSAGE (OUTPATIENT)
Dept: RESEARCH | Facility: HOSPITAL | Age: 74
End: 2024-10-22
Payer: MEDICARE

## 2024-10-23 ENCOUNTER — PATIENT MESSAGE (OUTPATIENT)
Dept: RESEARCH | Facility: HOSPITAL | Age: 74
End: 2024-10-23
Payer: MEDICARE

## 2024-10-29 ENCOUNTER — HOSPITAL ENCOUNTER (OUTPATIENT)
Dept: RADIOLOGY | Facility: HOSPITAL | Age: 74
Discharge: HOME | End: 2024-10-29
Attending: FAMILY MEDICINE
Payer: MEDICARE

## 2024-10-29 DIAGNOSIS — R91.1 SOLITARY PULMONARY NODULE: ICD-10-CM

## 2024-10-29 PROCEDURE — 71250 CT THORAX DX C-: CPT | Mod: 26,,, | Performed by: RADIOLOGY

## 2024-10-29 PROCEDURE — 71250 CT THORAX DX C-: CPT | Mod: TC

## 2024-12-09 ENCOUNTER — OFFICE VISIT (OUTPATIENT)
Dept: CARDIOLOGY | Facility: CLINIC | Age: 74
End: 2024-12-09
Payer: MEDICARE

## 2024-12-09 VITALS
HEART RATE: 61 BPM | BODY MASS INDEX: 27.93 KG/M2 | SYSTOLIC BLOOD PRESSURE: 126 MMHG | WEIGHT: 189.13 LBS | DIASTOLIC BLOOD PRESSURE: 80 MMHG | OXYGEN SATURATION: 96 %

## 2024-12-09 DIAGNOSIS — M89.662 POLIOMYELITIS OSTEOPATHY OF LEFT LOWER LEG: ICD-10-CM

## 2024-12-09 DIAGNOSIS — B91 POLIOMYELITIS OSTEOPATHY OF LEFT LOWER LEG: ICD-10-CM

## 2024-12-09 DIAGNOSIS — I25.10 CORONARY ARTERY DISEASE, UNSPECIFIED VESSEL OR LESION TYPE, UNSPECIFIED WHETHER ANGINA PRESENT, UNSPECIFIED WHETHER NATIVE OR TRANSPLANTED HEART: ICD-10-CM

## 2024-12-09 DIAGNOSIS — Z98.890 HISTORY OF CORONARY ANGIOGRAM: ICD-10-CM

## 2024-12-09 DIAGNOSIS — R49.0 VOICE HOARSENESS: Primary | ICD-10-CM

## 2024-12-09 PROCEDURE — 3079F DIAST BP 80-89 MM HG: CPT | Mod: CPTII,S$GLB,, | Performed by: INTERNAL MEDICINE

## 2024-12-09 PROCEDURE — 1126F AMNT PAIN NOTED NONE PRSNT: CPT | Mod: CPTII,S$GLB,, | Performed by: INTERNAL MEDICINE

## 2024-12-09 PROCEDURE — 1157F ADVNC CARE PLAN IN RCRD: CPT | Mod: CPTII,S$GLB,, | Performed by: INTERNAL MEDICINE

## 2024-12-09 PROCEDURE — 3074F SYST BP LT 130 MM HG: CPT | Mod: CPTII,S$GLB,, | Performed by: INTERNAL MEDICINE

## 2024-12-09 PROCEDURE — 1159F MED LIST DOCD IN RCRD: CPT | Mod: CPTII,S$GLB,, | Performed by: INTERNAL MEDICINE

## 2024-12-09 PROCEDURE — 1101F PT FALLS ASSESS-DOCD LE1/YR: CPT | Mod: CPTII,S$GLB,, | Performed by: INTERNAL MEDICINE

## 2024-12-09 PROCEDURE — 99999 PR PBB SHADOW E&M-EST. PATIENT-LVL III: CPT | Mod: PBBFAC,,, | Performed by: INTERNAL MEDICINE

## 2024-12-09 PROCEDURE — 99214 OFFICE O/P EST MOD 30 MIN: CPT | Mod: S$GLB,,, | Performed by: INTERNAL MEDICINE

## 2024-12-09 PROCEDURE — 3008F BODY MASS INDEX DOCD: CPT | Mod: CPTII,S$GLB,, | Performed by: INTERNAL MEDICINE

## 2024-12-09 PROCEDURE — 3288F FALL RISK ASSESSMENT DOCD: CPT | Mod: CPTII,S$GLB,, | Performed by: INTERNAL MEDICINE

## 2024-12-09 NOTE — PROGRESS NOTES
Subjective:   Patient ID:  Olivier Cuenca is a 74 y.o. male who presents for evaluation of Follow-up        Follow-up  Pertinent negatives include no chest pain.   12.9.2024  Doing well.  PCP increase recently his metoprolol.    Denies any angina or significant dyspnea.    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    Known to have mid LAD bridging.  With nonobstructive disease.    He refused Toprol in the past however today he states that he is open to try it  He gets some episodes of chest tightness same as when he presented for his initial evaluation.    No other complaints at this time.      4.10.2022  Comes in for a six-month follow-up.  No significant interval changes.    Active and working on building a house  Denies any limiting chest pain.  Known to have mid myocardial LAD bridging.  He states working on his diet in lowering carbs intake.        10/13/2022  Comes in for follow-up after his heart catheterization.  Nonobstructive disease in his mid LAD bridging.    Denies any worsening angina.    No dyspnea.    No access site issues.  Right radial access site normal.          08/2022  72 yo male , 20 PQY, quit 37 years ago ,   Comes in for evaluation of chest pain, started about 3 weeks ago, was having sharp, left sided chest pain, parasternal mild right and left sided pains, seconds lasting , not with exertion   States when he is wore up and busy , no chest pain   He also uses crutches at home.  Not tender on exam.  No dyspnea on exertion.      Past Medical History:   Diagnosis Date    Bilateral  renal cysts 2023    BPH (benign prostatic hyperplasia)     CAD (coronary artery disease)     nonobs    CAD (coronary artery disease)     Colon polyp     Erectile dysfunction     Ex-smoker     quit     Fatty liver     Herpes simplex     History of poliomyelitis     Migraine     Thalassemia minor        Past Surgical History:   Procedure Laterality Date    APPENDECTOMY      CHOLECYSTECTOMY      COLONOSCOPY N/A 2018    Procedure: COLONOSCOPY;  Surgeon: Bo Moreno MD;  Location: [facility] ENDO;  Service: Endoscopy;  Laterality: N/A;  Pt requesting no anesthesia - no sedation.  Will not have .    COLONOSCOPY N/A 6/10/2021    Procedure: COLONOSCOPY;  Surgeon: Juancho Spicer MD;  Location: [facility] ENDO;  Service: Endoscopy;  Laterality: N/A;    CORONARY ANGIOGRAPHY N/A 10/7/2022    Procedure: ANGIOGRAM, CORONARY ARTERY;  Surgeon: Pelon Lombardo MD;  Location: [facility] CATH LAB;  Service: Cardiology;  Laterality: N/A;    FEMUR FRACTURE SURGERY  2020    after fall from deer stand    HERNIA REPAIR      LEFT HEART CATHETERIZATION Left 10/7/2022    Procedure: CATHETERIZATION, HEART, LEFT;  Surgeon: Pelon Lombardo MD;  Location: [facility] CATH LAB;  Service: Cardiology;  Laterality: Left;       Social History     Tobacco Use    Smoking status: Former     Current packs/day: 0.00     Types: Cigarettes     Start date: 3/13/1964     Quit date: 3/13/1985     Years since quittin.7    Smokeless tobacco: Former   Substance Use Topics    Alcohol use: No    Drug use: Yes     Types: Marijuana       Family History   Problem Relation Name Age of Onset    Dementia Mother      Hyperlipidemia Father      Melanoma Neg Hx         Review of Systems   Cardiovascular:  Negative for chest pain, dyspnea on exertion, palpitations and syncope.   Genitourinary: Negative.    Neurological: Negative.        Current Outpatient Medications on File Prior to Visit   Medication Sig    metoprolol succinate (TOPROL-XL) 50 MG 24  hr tablet Take 1 tablet (50 mg total) by mouth once daily.    tadalafiL (CIALIS) 5 MG tablet Take 1 tablet (5 mg total) by mouth once daily. **May Cause Dizziness** *DO NOT TAKE WITH NITRATE *    ketoconazole (NIZORAL) 2 % cream Apply topically once daily. To hand rash for 10 days     No current facility-administered medications on file prior to visit.       Objective:   Objective:  Wt Readings from Last 3 Encounters:   12/09/24 85.8 kg (189 lb 2.5 oz)   10/21/24 85.2 kg (187 lb 13.3 oz)   09/03/24 85.3 kg (188 lb 0.8 oz)     Temp Readings from Last 3 Encounters:   10/21/24 98.5 °F (36.9 °C) (Tympanic)   09/18/23 97.3 °F (36.3 °C) (Tympanic)   09/06/23 98 °F (36.7 °C) (Oral)     BP Readings from Last 3 Encounters:   12/09/24 126/80   10/21/24 138/82   09/03/24 (!) 160/90     Pulse Readings from Last 3 Encounters:   12/09/24 61   10/21/24 (!) 58   09/03/24 60       Physical Exam  Vitals reviewed.   Constitutional:       Appearance: He is well-developed.   Neck:      Vascular: No carotid bruit.   Cardiovascular:      Rate and Rhythm: Normal rate and regular rhythm.      Pulses: Intact distal pulses.      Heart sounds: Normal heart sounds. No murmur heard.  Pulmonary:      Breath sounds: Normal breath sounds.   Neurological:      Mental Status: He is oriented to person, place, and time.         Lab Results   Component Value Date    CHOL 131 10/21/2024    CHOL 127 08/01/2023    CHOL 125 07/15/2022     Lab Results   Component Value Date    HDL 37 (L) 10/21/2024    HDL 36 (L) 08/01/2023    HDL 36 (L) 07/15/2022     Lab Results   Component Value Date    LDLCALC 63.2 10/21/2024    LDLCALC 71.8 08/01/2023    LDLCALC 64.4 07/15/2022     Lab Results   Component Value Date    TRIG 154 (H) 10/21/2024    TRIG 96 08/01/2023    TRIG 123 07/15/2022     Lab Results   Component Value Date    CHOLHDL 28.2 10/21/2024    CHOLHDL 28.3 08/01/2023    CHOLHDL 28.8 07/15/2022       Chemistry        Component Value Date/Time      10/21/2024 1203    K 4.9 10/21/2024 1203     10/21/2024 1203    CO2 26 10/21/2024 1203    BUN 14 10/21/2024 1203    CREATININE 0.8 10/21/2024 1203    GLU 88 10/21/2024 1203        Component Value Date/Time    CALCIUM 10.1 10/21/2024 1203    ALKPHOS 93 10/21/2024 1203    AST 23 10/21/2024 1203    ALT 23 10/21/2024 1203    BILITOT 0.8 10/21/2024 1203    ESTGFRAFRICA >60.0 07/15/2022 0827    EGFRNONAA >60.0 07/15/2022 0827          Lab Results   Component Value Date    TSH 2.39 08/04/2009     Lab Results   Component Value Date    INR 1.0 10/05/2022     Lab Results   Component Value Date    WBC 5.44 08/01/2023    HGB 16.3 08/01/2023    HCT 49.8 08/01/2023    MCV 93 08/01/2023     08/01/2023     BNP  @LABRCNTIP(BNP,BNPTRIAGEBLO)@  CrCl cannot be calculated (Patient's most recent lab result is older than the maximum 7 days allowed.).     Imaging:  ======  No results found for this or any previous visit.    No results found for this or any previous visit.    No results found for this or any previous visit.    Results for orders placed during the hospital encounter of 07/27/22    X-Ray Chest PA And Lateral    Narrative  EXAMINATION:  XR CHEST PA AND LATERAL    CLINICAL HISTORY:  Chest pain, unspecified    TECHNIQUE:  PA and lateral views of the chest were performed.    COMPARISON:  Prior radiographs    FINDINGS:  Cardiac silhouette and mediastinal contours are normal.  Lungs are clear.  Osseous structures are intact.    Impression  No acute cardiopulmonary process.      Electronically signed by: Yuri Bran MD  Date:    07/27/2022  Time:    11:43    No results found for this or any previous visit.    No valid procedures specified.    Results for orders placed during the hospital encounter of 10/07/22    Cardiac catheterization    Conclusion    The pre-procedure left ventricular end diastolic pressure was 8.    The estimated blood loss was none.    There was non-obstructive coronary artery disease.    Mid  LAD bridging    The procedure log was documented by Documenter: Cely Antonio RN and verified by Pelon Lombardo MD.    Date: 10/7/2022  Time: 11:28 AM      Interpretation Summary 6.2024  Show Result Comparison     There is 0-19% right Internal Carotid Stenosis.    There is 0-19% left Internal Carotid Stenosis.      Diagnostic Results:  ECG: Reviewed  Sinus rhythm.  Normal ECG.  The 10-year ASCVD risk score (Amna DK, et al., 2019) is: 21.8%    Values used to calculate the score:      Age: 74 years      Sex: Male      Is Non- : No      Diabetic: No      Tobacco smoker: No      Systolic Blood Pressure: 126 mmHg      Is BP treated: No      HDL Cholesterol: 37 mg/dL      Total Cholesterol: 131 mg/dL    Assessment and Plan:   Voice hoarseness  -     Ambulatory referral/consult to ENT; Future; Expected date: 12/16/2024    Coronary artery disease, unspecified vessel or lesion type, unspecified whether angina present, unspecified whether native or transplanted heart    Poliomyelitis osteopathy of left lower leg    History of coronary angiogram          Carotid ultrasound reviewed.  0 -19% bilateral  Mid LAD bridging.  Patient to continue toprol, Discussed increasing toprol, but will continue to monitor  Patient expresses he has a lot of concern about medications and side effects.  Declined statin before.  Reviewed all tests and above medical conditions with patient in detail and formulated treatment plan.  Risk factor modification discussed.   Cardiac low salt diet discussed.  Maintaining healthy weight and weight loss goals were discussed in clinic.  Asking for ENT referral for voice hoarseness    Follow up in 12 months

## 2024-12-18 ENCOUNTER — OFFICE VISIT (OUTPATIENT)
Dept: OTOLARYNGOLOGY | Facility: CLINIC | Age: 74
End: 2024-12-18
Payer: MEDICARE

## 2024-12-18 VITALS — HEIGHT: 69 IN | WEIGHT: 188.94 LBS | BODY MASS INDEX: 27.98 KG/M2

## 2024-12-18 DIAGNOSIS — R09.A2 GLOBUS SENSATION: ICD-10-CM

## 2024-12-18 DIAGNOSIS — K21.9 LARYNGOPHARYNGEAL REFLUX (LPR): Primary | ICD-10-CM

## 2024-12-18 DIAGNOSIS — R49.0 VOICE HOARSENESS: ICD-10-CM

## 2024-12-18 DIAGNOSIS — R49.0 HOARSE: ICD-10-CM

## 2024-12-18 PROCEDURE — 1101F PT FALLS ASSESS-DOCD LE1/YR: CPT | Mod: CPTII,S$GLB,, | Performed by: OTOLARYNGOLOGY

## 2024-12-18 PROCEDURE — 99999 PR PBB SHADOW E&M-EST. PATIENT-LVL III: CPT | Mod: PBBFAC,,, | Performed by: OTOLARYNGOLOGY

## 2024-12-18 PROCEDURE — 1157F ADVNC CARE PLAN IN RCRD: CPT | Mod: CPTII,S$GLB,, | Performed by: OTOLARYNGOLOGY

## 2024-12-18 PROCEDURE — 1126F AMNT PAIN NOTED NONE PRSNT: CPT | Mod: CPTII,S$GLB,, | Performed by: OTOLARYNGOLOGY

## 2024-12-18 PROCEDURE — 3008F BODY MASS INDEX DOCD: CPT | Mod: CPTII,S$GLB,, | Performed by: OTOLARYNGOLOGY

## 2024-12-18 PROCEDURE — 99203 OFFICE O/P NEW LOW 30 MIN: CPT | Mod: 25,S$GLB,, | Performed by: OTOLARYNGOLOGY

## 2024-12-18 PROCEDURE — 31575 DIAGNOSTIC LARYNGOSCOPY: CPT | Mod: S$GLB,,, | Performed by: OTOLARYNGOLOGY

## 2024-12-18 PROCEDURE — 1159F MED LIST DOCD IN RCRD: CPT | Mod: CPTII,S$GLB,, | Performed by: OTOLARYNGOLOGY

## 2024-12-18 PROCEDURE — 3288F FALL RISK ASSESSMENT DOCD: CPT | Mod: CPTII,S$GLB,, | Performed by: OTOLARYNGOLOGY

## 2024-12-18 NOTE — PROGRESS NOTES
"Referring Provider:    Gucci Giang Md  [id number] [place] [address]  Subjective:   Patient: Olivier Cuenca [id number], :1950   Visit date:2024 2:47 PM    Chief Complaint:  globus sensation (Pt states he feels like something is in throat that does not need to be there. States will have pain on both sides of throat that comes and goes since 2016)    HPI:    Prior notes reviewed by myself.  Clinical documentation obtained by nursing staff reviewed.       74-year-old gentleman presents for evaluation of globus sensation, throat clearing, hoarseness.  These symptoms are intermittent.  He does have occasional heartburn and he takes Pepcid complete p.r.n. for this.  He was seen for the same condition by Dr. Lennon in 2016 and started on reflux medication.  He also saw GI around that time and had an EGD.  He is concerned about potential side effects of PPI's.        Objective:     Physical Exam:  Vitals:  Ht 5' 9" (1.753 m)   Wt 85.7 kg (188 lb 15 oz)   BMI 27.90 kg/m²   General appearance:  Well developed, well nourished    Ears:  Otoscopy of external auditory canals and tympanic membranes was normal, clinical speech reception thresholds grossly intact, no mass/lesion of auricle.    Nose:  No masses/lesions of external nose, nasal mucosa, septum, and turbinates were within normal limits.    Mouth:  No mass/lesion of lips, teeth, gums, hard/soft palate, tongue, tonsils, or oropharynx.    Neck & Lymphatics:  No cervical lymphadenopathy, no neck mass/crepitus/ asymmetry, trachea is midline, no thyroid enlargement/tenderness/mass.    Due to indication in patient's history, presentation or risk factors,  a fiber optic exam was performed.    SEPARATE PROCEDURE NOTE:    ANESTHESIA:  Topical xylocaine with mack-synephrine  FINDINGS:  Moderate to severe inaterarytenoid erythema and edema    PROCEDURE:  After verbal consent was obtained, the flexible scope was passed through the patient's nasal " cavity without difficulty.  The nasopharynx (adenoid pad) and eustachian tube orifices were first visualized and were found to be normal, without masses or irregularity.  The posterior pharyngeal wall and base of tongue were then examined and no mass or irregular tissue was seen.  The scope was then advanced to the larynx, and the epiglottis, valleculae, and piriform sinuses were normal, without masses or mucosal irregularity.  The false vocal folds and true vocal folds were then examined and were found to have normal mobility (full abduction and adduction) and no masses or mucosal irregularity was seen.  The interartyenoid area had moderate to severe edema and erythema consistent with reflux. Visualized portions of the subglottis were also noted to be normal in appearance with normal tracheal mucosa.      [x]  Data Reviewed:    Lab Results   Component Value Date    WBC 5.44 08/01/2023    HGB 16.3 08/01/2023    HCT 49.8 08/01/2023    MCV 93 08/01/2023    EOSINOPHIL 1.7 08/01/2023             Assessment & Plan:   Laryngopharyngeal reflux (LPR)    Chronic Hoarseness   -     Ambulatory referral/consult to ENT    Voice hoarseness  -     Ambulatory referral/consult to ENT    Globus sensation        Discussed options including PPI, pepcid and reflux gourmet.  He would like to try OTC pepcid and reflux gourmet.  He would like to follow up PRN.    Laryngopharyngeal Reflux (LPR) Patient Information and Medication Instructions    LPR (laryngopharyngeal reflux) can be a challenging condition to control.  Some patients require once daily medication like a proton pump inhibitor to control their symptoms (such as Omeprazole, Pantoprazole, Esomeprazole).  HOWEVER, other patients will require taking this medication twice daily and even may be started on another medication called an H2 blocker (such as Pepcid, Zantac) at bedtime.    It is important that medication is not the only technique that you use to help control your LPR.   Therapeutic lifestyle changes are very important as well:     Weight Loss:  If you are overweight, losing weight can significantly reduce your reflux.  Diet Control:  It is important to determine what your Trigger foods for reflux are.  Limiting your intake of these foods will significantly improve your reflux.  Examples of foods known to increase reflux are listed below  Carbonated beverages  Caffeine (this includes Coffee, soda, iced tea, energy drinks etc.)  Alcohol  Fried/ fatty/ greasy foods  Acidic foods (citrus, tomato etc.)  Chocolate  Spearmint/Peppermint  Elevating the head of your bed:  This doesn't mean more pillows.  You need to actually elevate the head of your bed.  Some patients have found something to put under the legs of the head of their bed.  Other patients have employed a wedge or an air bladder of some sort to elevate the head of their bed.  This makes a significant difference with reflux and can also help with nasal congestion.  Eating before bedtime:  Try not to eat anything for at least 2 hours before bedtime.  This allows for less material to remain in your stomach when you lay down at night which equals less severe reflux.  More information:  If you would like to read more about diet changes and lifestyle changes that you can employ that will help with your reflux, the books below may be helpful.  Dropping Acid:  The Reflux diet Cookbook & Cure by Segundo Hanson M.D. and Duarte Landers M.D.  The Acid Watcher Diet:  A 28 Day Reflux Prevention and Healing Program by Denny Williamson M.D.      Weaning Instructions After Symptom Improvement    It can sometimes take up to 12 weeks to see symptom improvement in LPR.  The medications may reduce the amount of acid you are producing very quickly, but then the tissues of your voice box and upper throat have to heal themselves.  Your physician may have increased year proton pump inhibitor to twice daily dosing and even may have added an H2 blocker at  bedtime.  Eventually, the goal is a complete resolution of your symptoms.  Hopefully you have been working on the lifestyle modifications listed above over this time period as well!    Once your symptoms have improved, our goal is to wean you back off of your reflux medication.  The instructions below will help guide you through this process.    Take all anti-reflux medications for at least 3 weeks beyond when your symptoms have improved/resolved  If you are on Twice daily dosing of a proton pump inhibitor (omeprazole, pantoprazole, esomeprazole etc.) and an H2 blocker at bedtime (pepcid, zantac) then you should first discontinue your night time dose of your proton pump inhibitor.  If your symptoms are still controlled after 3 weeks of taking your PPI in the morning and your H2 blocker at bedtime,  discontinue your bedtime H2 blocker  If your symptoms are still controlled after 3 more weeks of only taking your PPI in the morning, discontinue your morning PPI    If at any point your symptoms return during this weaning process, you can return to the previous step and let your physician know at the next appointment.      Wilfred Dale M.D.  Department of Otolaryngology - Head & Neck Surgery  [address] [place] [address].  [place] [address]  P: [phone]  F: [phone]        DISCLAIMER: This note was prepared with [employer] voice recognition transcription software. Garbled syntax, mangled pronouns, and other bizarre constructions may be attributed to that software system. While efforts were made to correct any mistakes made by this voice recognition program, some errors and/or omissions may remain in the note that were missed when the note was originally created.

## 2025-01-07 RX ORDER — TADALAFIL 5 MG/1
TABLET ORAL
Qty: 30 TABLET | Refills: 11 | Status: SHIPPED | OUTPATIENT
Start: 2025-01-07

## 2025-01-31 ENCOUNTER — TELEPHONE (OUTPATIENT)
Dept: UROLOGY | Facility: CLINIC | Age: 75
End: 2025-01-31
Payer: MEDICARE

## 2025-01-31 NOTE — TELEPHONE ENCOUNTER
..Notified pt that his pending appt with Dr. Hook needed to be rescheduled as MD would not be in clinic; pt verbalized understanding and appt was rescheduled.

## 2025-03-07 ENCOUNTER — OFFICE VISIT (OUTPATIENT)
Dept: UROLOGY | Facility: CLINIC | Age: 75
End: 2025-03-07
Payer: MEDICARE

## 2025-03-07 ENCOUNTER — LAB VISIT (OUTPATIENT)
Dept: LAB | Facility: HOSPITAL | Age: 75
End: 2025-03-07
Attending: UROLOGY
Payer: MEDICARE

## 2025-03-07 VITALS — DIASTOLIC BLOOD PRESSURE: 76 MMHG | SYSTOLIC BLOOD PRESSURE: 125 MMHG | HEART RATE: 66 BPM

## 2025-03-07 DIAGNOSIS — R31.0 GROSS HEMATURIA: ICD-10-CM

## 2025-03-07 DIAGNOSIS — N40.1 BENIGN PROSTATIC HYPERPLASIA WITH URINARY FREQUENCY: Primary | ICD-10-CM

## 2025-03-07 DIAGNOSIS — R97.20 ELEVATED PSA: ICD-10-CM

## 2025-03-07 DIAGNOSIS — R35.0 BENIGN PROSTATIC HYPERPLASIA WITH URINARY FREQUENCY: Primary | ICD-10-CM

## 2025-03-07 DIAGNOSIS — N28.1 RENAL CYST: ICD-10-CM

## 2025-03-07 LAB
BILIRUB UR QL STRIP: NEGATIVE
GLUCOSE UR QL STRIP: NEGATIVE
KETONES UR QL STRIP: NEGATIVE
LEUKOCYTE ESTERASE UR QL STRIP: NEGATIVE
PH, POC UA: 7.5
POC BLOOD, URINE: NEGATIVE
POC NITRATES, URINE: NEGATIVE
POC RESIDUAL URINE VOLUME: 221 ML (ref 0–100)
PROT UR QL STRIP: NEGATIVE
SP GR UR STRIP: 1.01 (ref 1–1.03)
UROBILINOGEN UR STRIP-ACNC: 0.2 (ref 0.3–2.2)

## 2025-03-07 PROCEDURE — 36415 COLL VENOUS BLD VENIPUNCTURE: CPT | Performed by: UROLOGY

## 2025-03-07 PROCEDURE — 84153 ASSAY OF PSA TOTAL: CPT | Performed by: UROLOGY

## 2025-03-07 PROCEDURE — 99999 PR PBB SHADOW E&M-EST. PATIENT-LVL III: CPT | Mod: PBBFAC,,, | Performed by: UROLOGY

## 2025-03-07 NOTE — PROGRESS NOTES
Chief Complaint:   Encounter Diagnoses   Name Primary?    Benign prostatic hyperplasia with urinary frequency Yes    Elevated PSA     Renal cyst     Gross hematuria        HPI:    3/7/25- voiding well with no complaints on Cialis, erections are stable, no evidence gross hematuria.    72-year-old gentleman who comes in for re-evaluation of bilateral renal cysts.  In addition patient has BPH which he treats with Cialis 5 mg usually about twice per week.  He states that when he does take it symptoms definitely.  No gross hematuria, no microscopic hematuria, he is never been a smoker.  He has a weak stream, without the Cialis with it indefinitely improves it.  He does have increased frequency and urgency, no leakage, gets up 4 times per evening, known stable bilateral cysts.  PSA does appear to be rising, last check though was last summer.  No other urological history.  No family history of urological cancers or stones, except for prostate cancer in his father but diagnosed after he was 80.    Allergies:  Codeine and Codeine sulfate    Medications:  See MAR    Review of Systems:  General: No fever, chills, fatigability, or weight loss.  Skin: No rashes, itching, or changes in color or texture of skin.  Chest: Denies KAN, cyanosis, wheezing, cough, and sputum production.  Abdomen: Appetite fine. No weight loss. Denies diarrhea, abdominal pain, hematemesis, or blood in stool.  Musculoskeletal: No joint stiffness or swelling. Denies back pain.  : As above.  All other review of systems negative.    PMH:   has a past medical history of Bilateral renal cysts (5/29/2023), BPH (benign prostatic hyperplasia), CAD (coronary artery disease) (2022), CAD (coronary artery disease) (2022), Colon polyp, Erectile dysfunction, Ex-smoker, Fatty liver, Herpes simplex, History of poliomyelitis, Migraine, and Thalassemia minor.    PSH:   has a past surgical history that includes Cholecystectomy; Appendectomy; Hernia repair; Colonoscopy  (N/A, 2/28/2018); Femur fracture surgery (02/2020); Colonoscopy (N/A, 6/10/2021); Left heart catheterization (Left, 10/7/2022); and Coronary angiography (N/A, 10/7/2022).    FamHx: family history includes Dementia in his mother; Hyperlipidemia in his father.    SocHx:  reports that he quit smoking about 38 years ago. His smoking use included cigarettes. He started smoking about 59 years ago. He has quit using smokeless tobacco. He reports current drug use. Drug: Marijuana. He reports that he does not drink alcohol.      Physical Exam:  There were no vitals filed for this visit.    General: A&Ox3, no apparent distress, no deformities  Neck: No masses, normal ROM  Lungs: normal inspiration, no use of accessory muscles  Heart: normal pulse, no arrhythmias  Abdomen: Soft, NT, ND, no masses, no hernias, no hepatosplenomegaly  Skin: The skin is warm and dry. No jaundice.  Ext: No c/c/e.  : 1/24- Test desc vidya, no abnormalities of epididymus. Normal penile and scrotal skin. Meatus normal.  VAIBHAV: 6/23- Normal rectal tone. Prost 25 gm no nodules or masses appreciated. SV not palpable. Perineum and anus normal.    Labs/Studies:   UA normal 3/25   ml 3/25  Cystoscopy lateral lobe coaptation, large median lobe 1/24  Cytology negative 1/24  PSA 2.5 12/23  PSA 4.3 8/23  PSA 3.2 7/22   PSA 2.2 5/21  PSA 1.6 11/19  Creatinine 0.8 10/22   CT urogram splenic cysts 11/23  HENRRY right renal cyst 1.1 cm, left renal cyst 3.7 cm, stable 6/23  CT with contrast bilateral renal cysts 3/18    Impression/Plan:       1. BPH- Cialis 5 mg assist but both his voiding and erections, PVR today may have been false.  Continue current regimen.    2. Elevated PSA- PSA today and proceed as appropriate, if stable see me in a year with a PSA.      3. Gross hematuria- structurally clear, and will continue surveillance protocol, no evidence of recurrence.  Call with any issues prior to the next appointment.

## 2025-03-08 LAB — COMPLEXED PSA SERPL-MCNC: 2.7 NG/ML (ref 0–4)

## 2025-03-24 DIAGNOSIS — Z00.00 ENCOUNTER FOR MEDICARE ANNUAL WELLNESS EXAM: ICD-10-CM

## 2025-04-23 ENCOUNTER — TELEPHONE (OUTPATIENT)
Dept: INTERNAL MEDICINE | Facility: CLINIC | Age: 75
End: 2025-04-23
Payer: MEDICARE

## 2025-04-23 NOTE — TELEPHONE ENCOUNTER
----- Message from Viji sent at 4/23/2025 10:15 AM CDT -----  Contact: self  Type: Appointment AccessWho called: Olivier Solis for visit: headacheWhen does the patient need to be seen?: next availableNext Available Appointment: blockedWould the patient rather a call back or a response via [facility]?: call Natalie Call Back Number: [phone] Information: n/a

## 2025-04-24 ENCOUNTER — OFFICE VISIT (OUTPATIENT)
Dept: INTERNAL MEDICINE | Facility: CLINIC | Age: 75
End: 2025-04-24
Payer: MEDICARE

## 2025-04-24 VITALS
BODY MASS INDEX: 28.18 KG/M2 | OXYGEN SATURATION: 96 % | WEIGHT: 190.25 LBS | TEMPERATURE: 97 F | HEART RATE: 60 BPM | SYSTOLIC BLOOD PRESSURE: 120 MMHG | HEIGHT: 69 IN | DIASTOLIC BLOOD PRESSURE: 78 MMHG

## 2025-04-24 DIAGNOSIS — R20.8 ALLODYNIA: Primary | ICD-10-CM

## 2025-04-24 DIAGNOSIS — R97.20 ELEVATED PSA: ICD-10-CM

## 2025-04-24 DIAGNOSIS — N40.1 BENIGN PROSTATIC HYPERPLASIA WITH URINARY FREQUENCY: ICD-10-CM

## 2025-04-24 DIAGNOSIS — K76.0 FATTY LIVER: ICD-10-CM

## 2025-04-24 DIAGNOSIS — D22.9 MULTIPLE ATYPICAL SKIN MOLES: ICD-10-CM

## 2025-04-24 DIAGNOSIS — M89.662 POLIOMYELITIS OSTEOPATHY OF LEFT LOWER LEG: ICD-10-CM

## 2025-04-24 DIAGNOSIS — R49.0 HOARSE: ICD-10-CM

## 2025-04-24 DIAGNOSIS — B91 POLIOMYELITIS OSTEOPATHY OF LEFT LOWER LEG: ICD-10-CM

## 2025-04-24 DIAGNOSIS — I25.10 CORONARY ARTERY DISEASE INVOLVING NATIVE CORONARY ARTERY OF NATIVE HEART WITHOUT ANGINA PECTORIS: ICD-10-CM

## 2025-04-24 DIAGNOSIS — G44.219 EPISODIC TENSION-TYPE HEADACHE, NOT INTRACTABLE: ICD-10-CM

## 2025-04-24 DIAGNOSIS — R35.0 BENIGN PROSTATIC HYPERPLASIA WITH URINARY FREQUENCY: ICD-10-CM

## 2025-04-24 PROCEDURE — 1126F AMNT PAIN NOTED NONE PRSNT: CPT | Mod: CPTII,S$GLB,, | Performed by: PHYSICIAN ASSISTANT

## 2025-04-24 PROCEDURE — 99214 OFFICE O/P EST MOD 30 MIN: CPT | Mod: S$GLB,,, | Performed by: PHYSICIAN ASSISTANT

## 2025-04-24 PROCEDURE — 3008F BODY MASS INDEX DOCD: CPT | Mod: CPTII,S$GLB,, | Performed by: PHYSICIAN ASSISTANT

## 2025-04-24 PROCEDURE — 99999 PR PBB SHADOW E&M-EST. PATIENT-LVL III: CPT | Mod: PBBFAC,,, | Performed by: PHYSICIAN ASSISTANT

## 2025-04-24 PROCEDURE — 1160F RVW MEDS BY RX/DR IN RCRD: CPT | Mod: CPTII,S$GLB,, | Performed by: PHYSICIAN ASSISTANT

## 2025-04-24 PROCEDURE — 3078F DIAST BP <80 MM HG: CPT | Mod: CPTII,S$GLB,, | Performed by: PHYSICIAN ASSISTANT

## 2025-04-24 PROCEDURE — 1101F PT FALLS ASSESS-DOCD LE1/YR: CPT | Mod: CPTII,S$GLB,, | Performed by: PHYSICIAN ASSISTANT

## 2025-04-24 PROCEDURE — 1157F ADVNC CARE PLAN IN RCRD: CPT | Mod: CPTII,S$GLB,, | Performed by: PHYSICIAN ASSISTANT

## 2025-04-24 PROCEDURE — 3288F FALL RISK ASSESSMENT DOCD: CPT | Mod: CPTII,S$GLB,, | Performed by: PHYSICIAN ASSISTANT

## 2025-04-24 PROCEDURE — 3074F SYST BP LT 130 MM HG: CPT | Mod: CPTII,S$GLB,, | Performed by: PHYSICIAN ASSISTANT

## 2025-04-24 PROCEDURE — 1159F MED LIST DOCD IN RCRD: CPT | Mod: CPTII,S$GLB,, | Performed by: PHYSICIAN ASSISTANT

## 2025-04-24 NOTE — PROGRESS NOTES
Subjective:      Patient ID: Olivier Cuenca is a 74 y.o. male.    Chief Complaint: Headache    HPI  History of Present Illness    CHIEF COMPLAINT:  Mr. Cuenca presents today with scalp soreness and headaches  Additionally would like a follow up with derm for a routine skin check. He has some abnormal moles on his back that he would like evaluated  .   HEADACHE:  He reports scalp soreness and headaches located in the middle of the scalp with intensity of 2/10. Symptoms come and go. He denies dizziness, lightheadedness, vision changes, and recent head trauma. Initially avoided medication due to personal preference, but took Motrin which provided temporary relief before symptoms returned.    CARDIAC:  He experiences severe intermittent chest pain, characterized by being pain-free for 2-3 weeks followed by 2-3 days of pain episodes. Pain occurs at rest, not associated with activity, lasting 5-7 minutes before subsiding and recurring. 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 He denies shortness of breath or palpitations.    GERD:  He reports longstanding intermittent hoarseness. He takes Pepsin Complete as needed for heartburn symptoms with symptom relief.    MEDICAL HISTORY:  He has a history of childhood polio with subsequent post-polio syndrome, primarily affecting his previously unaffected leg causing progressive weakness. 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 Five years ago, he sustained traumatic leg and hip fractures from a deer stand fall, requiring surgical fixation with dyan placement.    Medications were reviewed        Monster for psa.     Problem List[1]    Current Medications[2]    Review of Systems   Constitutional:  Negative for activity change, appetite change,  "chills, diaphoresis, fatigue, fever and unexpected weight change.   HENT: Negative.  Negative for congestion, hearing loss, postnasal drip, rhinorrhea, sore throat, trouble swallowing and voice change.    Eyes: Negative.  Negative for visual disturbance.   Respiratory: Negative.  Negative for cough, choking, chest tightness and shortness of breath.    Cardiovascular:  Negative for chest pain, palpitations and leg swelling.   Gastrointestinal:  Negative for abdominal distention, abdominal pain, blood in stool, constipation, diarrhea, nausea and vomiting.   Endocrine: Negative for cold intolerance, heat intolerance, polydipsia and polyuria.   Genitourinary: Negative.  Negative for difficulty urinating and frequency.   Musculoskeletal:  Positive for arthralgias, gait problem and myalgias. Negative for back pain and joint swelling.   Skin:  Negative for color change, pallor, rash and wound.   Neurological:  Positive for headaches. Negative for dizziness, tremors, weakness, light-headedness and numbness.   Hematological:  Negative for adenopathy.   Psychiatric/Behavioral:  Negative for behavioral problems, confusion, self-injury, sleep disturbance and suicidal ideas. The patient is not nervous/anxious.      Objective:   /78 (BP Location: Right arm, Patient Position: Sitting)   Pulse 60   Temp 97 °F (36.1 °C) (Tympanic)   Ht 5' 9" (1.753 m)   Wt 86.3 kg (190 lb 4.1 oz)   SpO2 96%   BMI 28.10 kg/m²     Physical Exam  Vitals and nursing note reviewed.   Constitutional:       General: He is not in acute distress.     Appearance: Normal appearance. He is well-developed. He is not ill-appearing, toxic-appearing or diaphoretic.   HENT:      Head: Normocephalic and atraumatic.     Cardiovascular:      Rate and Rhythm: Normal rate and regular rhythm.      Heart sounds: Normal heart sounds. No murmur heard.     No friction rub. No gallop.   Pulmonary:      Effort: Pulmonary effort is normal. No respiratory distress.    "   Breath sounds: Normal breath sounds. No wheezing or rales.   Skin:     General: Skin is warm.      Findings: No rash.   Neurological:      Mental Status: He is alert and oriented to person, place, and time.   Psychiatric:         Mood and Affect: Mood normal.         Behavior: Behavior normal.         Thought Content: Thought content normal.         Judgment: Judgment normal.         Assessment:     1. Allodynia    2. Episodic tension-type headache, not intractable    3. Multiple atypical skin moles    4. Elevated PSA    5. Chronic Hoarseness     6. Benign prostatic hyperplasia with urinary frequency    7. Coronary artery disease involving native coronary artery of native heart without angina pectoris    8. Fatty liver    9. Poliomyelitis osteopathy of left lower leg      Plan:   Allodynia  Assessed scalp soreness and headache, likely due to tension from ponytail.  Ruled out red flag symptoms such as significant headache, vision changes, dizziness, or numbness/tingling.  Considered musculoskeletal cause given localized pain and relief with touching.  History of chest pain related to bridging of coronary artery.  History of hoarseness, likely due to reflux as per previous ENT evaluation.  Post-polio syndrome diagnosis and associated leg weakness, as determined by Dr. Don.    Episodic tension-type headache, not intractable  - Evaluated patient's reports of scalp soreness and slight headaches (2/10 intensity) for about a week, possibly related to wearing a ponytail.  - Headache localized to the center of the scalp, without associated symptoms like dizziness or vision changes.  - Physical exam of the scalp revealed no abnormalities.  - Explained that nerve irritation from ponytail could cause scalp sensitivity similar to sunburn sensation.   - Advised discontinuing ponytail use to assess if it resolves symptoms.  - trial of ibuprofen 600 mg 3 times daily with food for 3-4 days for potential tension headache and  inflammation.  - Instructed patient to contact the office if headache worsens, persists, or if new symptoms develop such as numbness, tingling, vision changes, or dizziness.    Multiple atypical skin moles  -     Ambulatory referral/consult to Dermatology; Future; Expected date: 05/01/2025    Elevated PSA    Chronic Hoarseness     Benign prostatic hyperplasia with urinary frequency    Coronary artery disease involving native coronary artery of native heart without angina pectoris  - Evaluated the bridging of the main artery (widowmaker) that dips into the heart, with approximately 30% affected.  - Assessed that this malformation is likely causing the patient's chest pain but is not life-threatening.  - No specific treatment recommended.  - Monitored patient's reports of intermittent chest pain lasting several minutes, occurring every few weeks.  - Pain is not associated with lying flat or eating and occurs at rest.  - Attributed to the coronary artery malformation based on previous evaluations.  - No specific treatment recommended.    Fatty liver    Poliomyelitis osteopathy of left lower leg  - Monitored the patient's ongoing weakness and fatigue in the good leg due to post-polio syndrome, which worsens with prolonged activity and progresses over time.  - Evaluated the patient's history of polio as a child, which has led to joint wear and post-polio syndrome affecting the good leg.  - Dr. Don diagnosed the condition as post-polio syndrome through a process of elimination.  - Advised against specific treatment, particularly exercise, as it may exacerbate the condition.  - Instead, recommended managing activities to prevent excessive fatigue.      GASTROESOPHAGEAL REFLUX DISEASE (GERD):  - Monitored patient's reports of reflux.  - Previous tests showed inflammation possibly due to reflux.  - Recommend consistent use of Pepcid or OTC Nexium for 2 weeks to manage persistent symptoms, including  hoarseness.    DYSPHONIA:  - Monitored patient's reports of intermittent hoarseness for years.  - Previous ENT evaluation and CT scan showed no abnormalities in the vocal cords, only inflammation possibly due to reflux.  - Assessed the intermittent hoarseness as attributable to reflux.  - Management plan included in GERD section.    PERSONAL HISTORY:  - Noted patient's fall from a deer stand 5 years ago, resulting in a broken leg and hip.  - Mr. Cuenca reports no current pain from the healed fracture.    FOLLOW-UP: Labs and follow up scheduled for October.         This note was generated with the assistance of ambient listening technology. Verbal consent was obtained by the patient and accompanying visitor(s) for the recording of patient appointment to facilitate this note. I attest to having reviewed and edited the generated note for accuracy, though some syntax or spelling errors may persist. Please contact the author of this note for any clarification.\    No follow-ups on file.           [1]   Patient Active Problem List  Diagnosis    Fatty liver    Erectile dysfunction    BPH (benign prostatic hyperplasia)    Personal history of colonic polyps    Poliomyelitis osteopathy of left lower leg    DDD (degenerative disc disease), cervical    GERD (gastroesophageal reflux disease)    Screen for colon cancer    Colon cancer screening    Herpes simplex    CAD (coronary artery disease)    Lesions of spleen- possible cysts or hemangiomas.    Pulmonary nodules    Renal cyst    Chronic Hoarseness     Dependence on other enabling machines and devices    Abnormality of gait and mobility    Ex-smoker    Tinnitus    Benign prostatic hyperplasia with urinary frequency    Elevated PSA    Gross hematuria    Atypical chest pain   [2]   Current Outpatient Medications:     ketoconazole (NIZORAL) 2 % cream, Apply topically once daily. To hand rash for 10 days, Disp: 30 g, Rfl: 0    metoprolol succinate (TOPROL-XL) 50 MG 24 hr tablet,  Take 1 tablet (50 mg total) by mouth once daily., Disp: 90 tablet, Rfl: 3    tadalafiL (CIALIS) 5 MG tablet, Take 1 tablet (5 mg total) by mouth once daily. **May Cause Dizziness** *DO NOT TAKE WITH NITRATE *, Disp: 30 tablet, Rfl: 11

## 2025-05-26 ENCOUNTER — OFFICE VISIT (OUTPATIENT)
Dept: CARDIOLOGY | Facility: CLINIC | Age: 75
End: 2025-05-26
Payer: MEDICARE

## 2025-05-26 ENCOUNTER — HOSPITAL ENCOUNTER (OUTPATIENT)
Dept: CARDIOLOGY | Facility: HOSPITAL | Age: 75
Discharge: HOME | End: 2025-05-26
Payer: MEDICARE

## 2025-05-26 VITALS
BODY MASS INDEX: 27.92 KG/M2 | WEIGHT: 188.5 LBS | SYSTOLIC BLOOD PRESSURE: 136 MMHG | BODY MASS INDEX: 27.84 KG/M2 | WEIGHT: 188.5 LBS | HEIGHT: 69 IN | HEART RATE: 61 BPM | DIASTOLIC BLOOD PRESSURE: 78 MMHG

## 2025-05-26 DIAGNOSIS — R42 DIZZINESS: ICD-10-CM

## 2025-05-26 DIAGNOSIS — Z00.00 ROUTINE ADULT HEALTH MAINTENANCE: ICD-10-CM

## 2025-05-26 DIAGNOSIS — Z87.891 EX-SMOKER: ICD-10-CM

## 2025-05-26 DIAGNOSIS — R42 DIZZINESS: Primary | ICD-10-CM

## 2025-05-26 DIAGNOSIS — I25.10 CORONARY ARTERY DISEASE INVOLVING NATIVE CORONARY ARTERY OF NATIVE HEART WITHOUT ANGINA PECTORIS: Primary | ICD-10-CM

## 2025-05-26 LAB
OHS QRS DURATION: 90 MS
OHS QTC CALCULATION: 396 MS

## 2025-05-26 PROCEDURE — 93005 ELECTROCARDIOGRAM TRACING: CPT

## 2025-05-26 PROCEDURE — 1159F MED LIST DOCD IN RCRD: CPT | Mod: CPTII,S$GLB,,

## 2025-05-26 PROCEDURE — 3075F SYST BP GE 130 - 139MM HG: CPT | Mod: CPTII,S$GLB,,

## 2025-05-26 PROCEDURE — 93010 ELECTROCARDIOGRAM REPORT: CPT | Mod: ,,, | Performed by: INTERNAL MEDICINE

## 2025-05-26 PROCEDURE — 99999 PR PBB SHADOW E&M-EST. PATIENT-LVL III: CPT | Mod: PBBFAC,,,

## 2025-05-26 PROCEDURE — 99214 OFFICE O/P EST MOD 30 MIN: CPT | Mod: S$GLB,,,

## 2025-05-26 PROCEDURE — 1101F PT FALLS ASSESS-DOCD LE1/YR: CPT | Mod: CPTII,S$GLB,,

## 2025-05-26 PROCEDURE — 3008F BODY MASS INDEX DOCD: CPT | Mod: CPTII,S$GLB,,

## 2025-05-26 PROCEDURE — 1126F AMNT PAIN NOTED NONE PRSNT: CPT | Mod: CPTII,S$GLB,,

## 2025-05-26 PROCEDURE — 1157F ADVNC CARE PLAN IN RCRD: CPT | Mod: CPTII,S$GLB,,

## 2025-05-26 PROCEDURE — 1160F RVW MEDS BY RX/DR IN RCRD: CPT | Mod: CPTII,S$GLB,,

## 2025-05-26 PROCEDURE — 3078F DIAST BP <80 MM HG: CPT | Mod: CPTII,S$GLB,,

## 2025-05-26 PROCEDURE — 3288F FALL RISK ASSESSMENT DOCD: CPT | Mod: CPTII,S$GLB,,

## 2025-05-26 NOTE — PROGRESS NOTES
Subjective:   Patient ID:  Olivier Cuenca is a 74 y.o. male who presents for evaluation of No chief complaint on file.      HPI 74-year-old male whose current medical conditions include CAD (lad bridging), nonobstructive elsewhere previously followed in cardiology clinic by Dr. Lombardo here today for CV follow-up complaining of dizziness weakness. Has been on Toprol 50mg for a while now, doesn't check his BP. Stays active working on his cabin in [place] Denies any CP or SOB. Also taking his cialis together with BB. EKG SR no acute dynamic changes noted      LDL 63 24'    Carotid ultrasound 6/24' with minimal stenosis  Lab Results   Component Value Date    HGBA1C 5.0 08/27/2004       Past Medical History:   Diagnosis Date    Bilateral renal cysts 5/29/2023    BPH (benign prostatic hyperplasia)     CAD (coronary artery disease) 2022    nonobs    CAD (coronary artery disease) 2022    Colon polyp     Erectile dysfunction     Ex-smoker     quit 1980s    Fatty liver     Herpes simplex     History of poliomyelitis     Migraine     Thalassemia minor        Past Surgical History:   Procedure Laterality Date    APPENDECTOMY      CHOLECYSTECTOMY      COLONOSCOPY N/A 2/28/2018    Procedure: COLONOSCOPY;  Surgeon: Bo Moreno MD;  Location: [facility] ENDO;  Service: Endoscopy;  Laterality: N/A;  Pt requesting no anesthesia - no sedation.  Will not have .    COLONOSCOPY N/A 6/10/2021    Procedure: COLONOSCOPY;  Surgeon: Juancho Spicer MD;  Location: [facility] ENDO;  Service: Endoscopy;  Laterality: N/A;    CORONARY ANGIOGRAPHY N/A 10/7/2022    Procedure: ANGIOGRAM, CORONARY ARTERY;  Surgeon: Pelon Lombardo MD;  Location: [facility] CATH LAB;  Service: Cardiology;  Laterality: N/A;    FEMUR FRACTURE SURGERY  02/2020    after fall from deer stand    HERNIA REPAIR      LEFT HEART CATHETERIZATION Left 10/7/2022    Procedure: CATHETERIZATION, HEART, LEFT;  Surgeon: Pelon Lombardo MD;  Location: [facility] CATH LAB;  Service: Cardiology;   Laterality: Left;       Social History[1]    Family History   Problem Relation Name Age of Onset    Dementia Mother      Hyperlipidemia Father      Melanoma Neg Hx         Medications Ordered Prior to Encounter[2]   Wt Readings from Last 3 Encounters:   05/26/25 85.5 kg (188 lb 7.9 oz)   05/26/25 85.5 kg (188 lb 7.9 oz)   04/24/25 86.3 kg (190 lb 4.1 oz)     Temp Readings from Last 3 Encounters:   04/24/25 97 °F (36.1 °C) (Tympanic)   10/21/24 98.5 °F (36.9 °C) (Tympanic)   09/18/23 97.3 °F (36.3 °C) (Tympanic)     BP Readings from Last 3 Encounters:   05/26/25 136/78   04/24/25 120/78   03/07/25 125/76     Pulse Readings from Last 3 Encounters:   05/26/25 61   04/24/25 60   03/07/25 66        Review of Systems   Constitutional: Positive for malaise/fatigue.   HENT: Negative.     Eyes: Negative.    Cardiovascular: Negative.    Respiratory: Negative.     Skin: Negative.    Musculoskeletal: Negative.    Gastrointestinal: Negative.    Genitourinary: Negative.    Neurological:  Positive for dizziness.   Psychiatric/Behavioral: Negative.         Objective:   Physical Exam  Vitals and nursing note reviewed.   Constitutional:       Appearance: Normal appearance.   HENT:      Head: Normocephalic and atraumatic.   Eyes:      General:         Right eye: No discharge.         Left eye: No discharge.      Pupils: Pupils are equal, round, and reactive to light.   Cardiovascular:      Rate and Rhythm: Normal rate and regular rhythm.      Heart sounds: S1 normal and S2 normal. No murmur heard.     No friction rub.   Pulmonary:      Effort: Pulmonary effort is normal. No respiratory distress.      Breath sounds: Normal breath sounds. No rales.   Abdominal:      Palpations: Abdomen is soft.      Tenderness: There is no abdominal tenderness.   Musculoskeletal:      Cervical back: Neck supple.      Right lower leg: No edema.      Left lower leg: No edema.   Skin:     General: Skin is warm and dry.   Neurological:      General: No  focal deficit present.      Mental Status: He is alert and oriented to person, place, and time.      Motor: Weakness present.   Psychiatric:         Mood and Affect: Mood normal.         Behavior: Behavior normal.         Thought Content: Thought content normal.         Lab Results   Component Value Date    CHOL 131 10/21/2024    CHOL 127 08/01/2023    CHOL 125 07/15/2022     Lab Results   Component Value Date    HDL 37 (L) 10/21/2024    HDL 36 (L) 08/01/2023    HDL 36 (L) 07/15/2022     Lab Results   Component Value Date    LDLCALC 63.2 10/21/2024    LDLCALC 71.8 08/01/2023    LDLCALC 64.4 07/15/2022     Lab Results   Component Value Date    TRIG 154 (H) 10/21/2024    TRIG 96 08/01/2023    TRIG 123 07/15/2022     Lab Results   Component Value Date    CHOLHDL 28.2 10/21/2024    CHOLHDL 28.3 08/01/2023    CHOLHDL 28.8 07/15/2022       Chemistry        Component Value Date/Time     10/21/2024 1203    K 4.9 10/21/2024 1203     10/21/2024 1203    CO2 26 10/21/2024 1203    BUN 14 10/21/2024 1203    CREATININE 0.8 10/21/2024 1203    GLU 88 10/21/2024 1203        Component Value Date/Time    CALCIUM 10.1 10/21/2024 1203    ALKPHOS 93 10/21/2024 1203    AST 23 10/21/2024 1203    ALT 23 10/21/2024 1203    BILITOT 0.8 10/21/2024 1203    ESTGFRAFRICA >60.0 07/15/2022 0827    EGFRNONAA >60.0 07/15/2022 0827          Lab Results   Component Value Date    TSH 2.39 08/04/2009     Lab Results   Component Value Date    INR 1.0 10/05/2022     @RESUFAST(WBC,HGB,HCT,MCV,PLT)  @LABRCNTIP(BNP,BNPTRIAGEBLO)@  CrCl cannot be calculated (Patient's most recent lab result is older than the maximum 7 days allowed.).     No results found for this or any previous visit.     Results for orders placed during the hospital encounter of 09/12/22    Nuclear Stress - Cardiology Interpreted    Interpretation Summary    Possible mild inferior ischemia.    The gated perfusion images showed an ejection fraction of 77% at rest. The gated  perfusion images showed an ejection fraction of 72% post stress.    The EKG portion of this study is negative for ischemia.    There were no arrhythmias during stress.     Assessment:      1. Coronary artery disease involving native coronary artery of native heart without angina pectoris    2. Ex-smoker        Plan:   Coronary artery disease involving native coronary artery of native heart without angina pectoris  -     Cardiac Monitor - 3-15 Day Adult (Cupid Only); Future    Ex-smoker      Switch toprol to evening, consider VC if no improvement    Metoprolol, low-sodium diet, profile blood pressure-CAD  Risk factor modifications   Smoking cessation  Low-fat diet  Daily exercise as tolerated    RTC 1m virtual to discuss dizziness and BP log since moving Toprol to evening  Consider VC    Neris Ace, FNP-C [facility], Cardiology         [1]   Social History  Tobacco Use    Smoking status: Former     Current packs/day: 0.00     Types: Cigarettes     Start date: 3/13/1964     Quit date: 3/13/1985     Years since quittin.2    Smokeless tobacco: Former   Substance Use Topics    Alcohol use: No    Drug use: Yes     Types: Marijuana   [2]   Current Outpatient Medications on File Prior to Visit   Medication Sig Dispense Refill    metoprolol succinate (TOPROL-XL) 50 MG 24 hr tablet Take 1 tablet (50 mg total) by mouth once daily. 90 tablet 3    tadalafiL (CIALIS) 5 MG tablet Take 1 tablet (5 mg total) by mouth once daily. **May Cause Dizziness** *DO NOT TAKE WITH NITRATE * 30 tablet 11    ketoconazole (NIZORAL) 2 % cream Apply topically once daily. To hand rash for 10 days 30 g 0     No current facility-administered medications on file prior to visit.

## 2025-07-03 ENCOUNTER — OFFICE VISIT (OUTPATIENT)
Dept: DERMATOLOGY | Facility: CLINIC | Age: 75
End: 2025-07-03
Payer: MEDICARE

## 2025-07-03 DIAGNOSIS — Z86.018 HISTORY OF DYSPLASTIC NEVUS: ICD-10-CM

## 2025-07-03 DIAGNOSIS — D48.5 NEOPLASM OF UNCERTAIN BEHAVIOR OF SKIN: ICD-10-CM

## 2025-07-03 DIAGNOSIS — D18.01 HEMANGIOMA OF SKIN: ICD-10-CM

## 2025-07-03 DIAGNOSIS — L82.1 SEBORRHEIC KERATOSIS: ICD-10-CM

## 2025-07-03 DIAGNOSIS — Z12.83 SCREENING, MALIGNANT NEOPLASM, SKIN: ICD-10-CM

## 2025-07-03 DIAGNOSIS — D22.9 MULTIPLE ATYPICAL SKIN MOLES: ICD-10-CM

## 2025-07-03 DIAGNOSIS — D22.9 MULTIPLE BENIGN NEVI: Primary | ICD-10-CM

## 2025-07-03 PROCEDURE — 99999 PR PBB SHADOW E&M-EST. PATIENT-LVL III: CPT | Mod: PBBFAC,,, | Performed by: DERMATOLOGY

## 2025-07-03 NOTE — PATIENT INSTRUCTIONS
Shave Biopsy Wound Care    Your doctor has performed a shave biopsy today.  A band aid and vaseline ointment has been placed over the site.  This should remain in place for 24 hours.  It is recommended that you keep the area dry for the first 24 hours.  After 24 hours, you may remove the band aid and wash the area with warm soap and water and apply Vaseline jelly.  Many patients prefer to use Neosporin or Bacitracin ointment.  This is acceptable; however, know that you can develop an allergy to this medication even if you have used it safely for years.  It is important to keep the area moist.  Letting it dry out and get air slows healing time, and will worsen the scar.  Band aid is optional after first 24 hours.      If you notice increasing redness, tenderness, pain, or yellow drainage at the biopsy site, please notify your doctor.  These are signs of an infection.    If your biopsy site is bleeding, apply firm pressure for 15 minutes straight.  Repeat for another 15 minutes, if it is still bleeding.   If the surgical site continues to bleed, then please contact your doctor.      [facility]   DERMATOLOGY  [address]   [place] [address]   Dept: [phone]   Dept Fax: [phone]

## 2025-07-03 NOTE — PROGRESS NOTES
Subjective:       Patient ID:  Olivier Cuenca is a 74 y.o. male who presents for   Chief Complaint   Patient presents with    Skin Check     Hx of mildly atypical nevus of the midline upper back and right upper back (s/p biopsy on 3/8/18), and multiple nevi, last seen by Dr. Hooper on 10/18/23 by Dr. Mathews.  He c/o several moles on the back. Denies changes.  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     This is a high risk patient here to check for the development of new lesions.    Denies bleeding, tender, growing, or concerning lesions.     The patient denies personal or family history of skin cancer.      Spot    Mole        Review of Systems   Constitutional:  Negative for fever and chills.   Gastrointestinal:  Negative for nausea and vomiting.   Skin:  Positive for activity-related sunscreen use. Negative for itching, rash, dry skin, daily sunscreen use and recent sunburn.   Hematologic/Lymphatic: Does not bruise/bleed easily.        Objective:    Physical Exam   Constitutional: He appears well-developed and well-nourished. No distress.   Neurological: He is alert and oriented to person, place, and time. He is not disoriented.   Psychiatric: He has a normal mood and affect.   Skin:   Areas Examined (abnormalities noted in diagram):   Head / Face Inspection Performed  Neck Inspection Performed  Chest / Axilla Inspection Performed  Abdomen Inspection Performed  Back Inspection Performed  RUE Inspected  LUE Inspection Performed  Nails and Digits Inspection Performed                   Diagram Legend     Erythematous scaling macule/papule c/w actinic keratosis       Vascular papule c/w angioma      Pigmented verrucoid papule/plaque c/w seborrheic keratosis      Yellow umbilicated papule c/w sebaceous hyperplasia      Irregularly shaped tan macule c/w lentigo     1-2 mm smooth white papules consistent with Milia      Movable subcutaneous cyst with punctum c/w epidermal inclusion cyst       Subcutaneous movable cyst c/w pilar cyst      Firm pink to brown papule c/w dermatofibroma      Pedunculated fleshy papule(s) c/w skin tag(s)      Evenly pigmented macule c/w junctional nevus     Mildly variegated pigmented, slightly irregular-bordered macule c/w mildly atypical nevus      Flesh colored to evenly pigmented papule c/w intradermal nevus       Pink pearly papule/plaque c/w basal cell carcinoma      Erythematous hyperkeratotic cursted plaque c/w SCC      Surgical scar with no sign of skin cancer recurrence      Open and closed comedones      Inflammatory papules and pustules      Verrucoid papule consistent consistent with wart     Erythematous eczematous patches and plaques     Dystrophic onycholytic nail with subungual debris c/w onychomycosis     Umbilicated papule    Erythematous-base heme-crusted tan verrucoid plaque consistent with inflamed seborrheic keratosis     Erythematous Silvery Scaling Plaque c/w Psoriasis     See annotation              Assessment / Plan:      Pathology Orders:       Normal Orders This Visit    Specimen to Pathology, Dermatology     Questions:    Procedure Type: Dermatology and skin neoplasms    Number of Specimens: 1    ------------------------: -------------------------    Spec 1 Procedure: Shave Biopsy    Spec 1 Clinical Impression: nevus r/o atypia    Spec 1 Source: right occipital scalp    Clinical Information: see above    Clinical History: see above    Specimen Source: Skin    Release to patient: Immediate    Send normal result to authorizing provider's In Basket if patient is active on MyChart: Yes          Multiple benign nevi  History of dysplastic nevus  Screening, malignant neoplasm, skin  Reassurance given.  Discussed ABCDEF of melanoma and changes for patient to look for.  Discussed importance of daily use of sunscreen which is broad-spectrum and has a minimum SPF of 30.    Multiple atypical skin moles  -     Ambulatory referral/consult to  Dermatology    Seborrheic keratosis  Reassurance given. Discussed diagnosis and that lesions are benign.  AAD handout given.     Hemangioma of skin  Reassurance given.  Lesions are benign.    Neoplasm of uncertain behavior of skin  -     Specimen to Pathology, Dermatology  -     Shave biopsy(-ies) done of 1 site(s).   Patient informed to call for results within 2 weeks if have not received notification via telephone call or [facility]           Follow up for call for results.    PROCEDURE NOTE - SHAVE BIOPSY   Location: see above    After risk, benefits, and alternatives were discussed with the patient, the patient agrees to the procedure by verbal informed consent.  The area(s) were cleansed with alcohol. 2 cc of lidocaine 1% with epinephrine was injected for local anesthesia into each lesion(s).  A sharp dermablade was used to remove part or all of the lesion(s).  The specimen(s) will be sent for tissue pathology.  Hemostasis was obtained with aluminum chloride and/or hyfrecation.  The area(s) were dressed with vaseline ointment and bandaged.  The patient tolerated the procedure well without adverse events.  Wound care instructions were given to the patient on the AVS.  The patient will be notified of pathology results once available. Results will also be available in Epic.

## (undated) DEVICE — OMNIPAQUE 300MG 150ML VIAL

## (undated) DEVICE — BAND TR COMP DEVICE REG 24CM

## (undated) DEVICE — ANGIOTOUCH KIT

## (undated) DEVICE — CATH INFINITI MULTIPAK JR4 5FR

## (undated) DEVICE — PACK CATH LAB CUSTOM BR

## (undated) DEVICE — KIT SYR REUSABLE

## (undated) DEVICE — CATH JR4 5FR

## (undated) DEVICE — KIT MANIFOLD LOW PRESS TUBING

## (undated) DEVICE — CATH JL4 5FR

## (undated) DEVICE — KIT GLIDESHEATH SLEND 6FR 10CM

## (undated) DEVICE — CATH PIG145 INFINITI 5X110CM